# Patient Record
Sex: MALE | Race: BLACK OR AFRICAN AMERICAN | NOT HISPANIC OR LATINO | URBAN - METROPOLITAN AREA
[De-identification: names, ages, dates, MRNs, and addresses within clinical notes are randomized per-mention and may not be internally consistent; named-entity substitution may affect disease eponyms.]

---

## 2018-05-01 ENCOUNTER — OUTPATIENT (OUTPATIENT)
Dept: OUTPATIENT SERVICES | Facility: HOSPITAL | Age: 53
LOS: 1 days | End: 2018-05-01
Payer: MEDICAID

## 2018-05-01 DIAGNOSIS — I77.0 ARTERIOVENOUS FISTULA, ACQUIRED: Chronic | ICD-10-CM

## 2018-05-01 DIAGNOSIS — Z98.89 OTHER SPECIFIED POSTPROCEDURAL STATES: Chronic | ICD-10-CM

## 2018-05-01 PROCEDURE — G9001: CPT

## 2018-05-03 DIAGNOSIS — R69 ILLNESS, UNSPECIFIED: ICD-10-CM

## 2019-12-28 ENCOUNTER — INPATIENT (INPATIENT)
Facility: HOSPITAL | Age: 54
LOS: 0 days | Discharge: HOME | DRG: 640 | End: 2019-12-29
Attending: INTERNAL MEDICINE | Admitting: HOSPITALIST
Payer: MEDICAID

## 2019-12-28 VITALS
RESPIRATION RATE: 20 BRPM | SYSTOLIC BLOOD PRESSURE: 199 MMHG | DIASTOLIC BLOOD PRESSURE: 102 MMHG | WEIGHT: 195.11 LBS | HEIGHT: 70 IN | HEART RATE: 97 BPM | TEMPERATURE: 98 F | OXYGEN SATURATION: 100 %

## 2019-12-28 DIAGNOSIS — Z98.89 OTHER SPECIFIED POSTPROCEDURAL STATES: Chronic | ICD-10-CM

## 2019-12-28 DIAGNOSIS — I77.0 ARTERIOVENOUS FISTULA, ACQUIRED: Chronic | ICD-10-CM

## 2019-12-28 DIAGNOSIS — E87.5 HYPERKALEMIA: ICD-10-CM

## 2019-12-28 LAB
ALBUMIN SERPL ELPH-MCNC: 3.6 G/DL — SIGNIFICANT CHANGE UP (ref 3.3–5)
ALP SERPL-CCNC: 75 U/L — SIGNIFICANT CHANGE UP (ref 40–120)
ALT FLD-CCNC: 10 U/L — SIGNIFICANT CHANGE UP (ref 10–45)
ANION GAP SERPL CALC-SCNC: 21 MMOL/L — HIGH (ref 5–17)
ANION GAP SERPL CALC-SCNC: 22 MMOL/L — HIGH (ref 5–17)
APTT BLD: 33.5 SEC — SIGNIFICANT CHANGE UP (ref 27.5–36.3)
AST SERPL-CCNC: 9 U/L — LOW (ref 10–40)
BASOPHILS # BLD AUTO: 0.05 K/UL — SIGNIFICANT CHANGE UP (ref 0–0.2)
BASOPHILS NFR BLD AUTO: 0.5 % — SIGNIFICANT CHANGE UP (ref 0–2)
BILIRUB SERPL-MCNC: 0.4 MG/DL — SIGNIFICANT CHANGE UP (ref 0.2–1.2)
BUN SERPL-MCNC: 119 MG/DL — HIGH (ref 7–23)
BUN SERPL-MCNC: 120 MG/DL — HIGH (ref 7–23)
CALCIUM SERPL-MCNC: 10 MG/DL — SIGNIFICANT CHANGE UP (ref 8.4–10.5)
CALCIUM SERPL-MCNC: 10.4 MG/DL — SIGNIFICANT CHANGE UP (ref 8.4–10.5)
CHLORIDE SERPL-SCNC: 103 MMOL/L — SIGNIFICANT CHANGE UP (ref 96–108)
CHLORIDE SERPL-SCNC: 104 MMOL/L — SIGNIFICANT CHANGE UP (ref 96–108)
CO2 SERPL-SCNC: 14 MMOL/L — LOW (ref 22–31)
CO2 SERPL-SCNC: 15 MMOL/L — LOW (ref 22–31)
CREAT SERPL-MCNC: 22.52 MG/DL — HIGH (ref 0.5–1.3)
CREAT SERPL-MCNC: 23.06 MG/DL — HIGH (ref 0.5–1.3)
EOSINOPHIL # BLD AUTO: 0.2 K/UL — SIGNIFICANT CHANGE UP (ref 0–0.5)
EOSINOPHIL NFR BLD AUTO: 2.2 % — SIGNIFICANT CHANGE UP (ref 0–6)
GAS PNL BLDV: SIGNIFICANT CHANGE UP
GLUCOSE SERPL-MCNC: 61 MG/DL — LOW (ref 70–99)
GLUCOSE SERPL-MCNC: 77 MG/DL — SIGNIFICANT CHANGE UP (ref 70–99)
HCT VFR BLD CALC: 30.8 % — LOW (ref 39–50)
HGB BLD-MCNC: 9.5 G/DL — LOW (ref 13–17)
IMM GRANULOCYTES NFR BLD AUTO: 0.3 % — SIGNIFICANT CHANGE UP (ref 0–1.5)
INR BLD: 1.05 RATIO — SIGNIFICANT CHANGE UP (ref 0.88–1.16)
LYMPHOCYTES # BLD AUTO: 1.75 K/UL — SIGNIFICANT CHANGE UP (ref 1–3.3)
LYMPHOCYTES # BLD AUTO: 19 % — SIGNIFICANT CHANGE UP (ref 13–44)
MAGNESIUM SERPL-MCNC: 2.6 MG/DL — SIGNIFICANT CHANGE UP (ref 1.6–2.6)
MCHC RBC-ENTMCNC: 28.7 PG — SIGNIFICANT CHANGE UP (ref 27–34)
MCHC RBC-ENTMCNC: 30.8 GM/DL — LOW (ref 32–36)
MCV RBC AUTO: 93.1 FL — SIGNIFICANT CHANGE UP (ref 80–100)
MONOCYTES # BLD AUTO: 0.9 K/UL — SIGNIFICANT CHANGE UP (ref 0–0.9)
MONOCYTES NFR BLD AUTO: 9.8 % — SIGNIFICANT CHANGE UP (ref 2–14)
NEUTROPHILS # BLD AUTO: 6.28 K/UL — SIGNIFICANT CHANGE UP (ref 1.8–7.4)
NEUTROPHILS NFR BLD AUTO: 68.2 % — SIGNIFICANT CHANGE UP (ref 43–77)
NRBC # BLD: 0 /100 WBCS — SIGNIFICANT CHANGE UP (ref 0–0)
PHOSPHATE SERPL-MCNC: 2 MG/DL — LOW (ref 2.5–4.5)
PLATELET # BLD AUTO: 137 K/UL — LOW (ref 150–400)
POTASSIUM SERPL-MCNC: 5.4 MMOL/L — HIGH (ref 3.5–5.3)
POTASSIUM SERPL-MCNC: 5.9 MMOL/L — HIGH (ref 3.5–5.3)
POTASSIUM SERPL-SCNC: 5.4 MMOL/L — HIGH (ref 3.5–5.3)
POTASSIUM SERPL-SCNC: 5.9 MMOL/L — HIGH (ref 3.5–5.3)
PROT SERPL-MCNC: 7 G/DL — SIGNIFICANT CHANGE UP (ref 6–8.3)
PROTHROM AB SERPL-ACNC: 12.1 SEC — SIGNIFICANT CHANGE UP (ref 10–12.9)
RBC # BLD: 3.31 M/UL — LOW (ref 4.2–5.8)
RBC # FLD: 18.1 % — HIGH (ref 10.3–14.5)
SODIUM SERPL-SCNC: 139 MMOL/L — SIGNIFICANT CHANGE UP (ref 135–145)
SODIUM SERPL-SCNC: 140 MMOL/L — SIGNIFICANT CHANGE UP (ref 135–145)
WBC # BLD: 9.21 K/UL — SIGNIFICANT CHANGE UP (ref 3.8–10.5)
WBC # FLD AUTO: 9.21 K/UL — SIGNIFICANT CHANGE UP (ref 3.8–10.5)

## 2019-12-28 PROCEDURE — 93010 ELECTROCARDIOGRAM REPORT: CPT

## 2019-12-28 PROCEDURE — 71046 X-RAY EXAM CHEST 2 VIEWS: CPT | Mod: 26

## 2019-12-28 PROCEDURE — 99291 CRITICAL CARE FIRST HOUR: CPT

## 2019-12-28 RX ORDER — INSULIN HUMAN 100 [IU]/ML
5 INJECTION, SOLUTION SUBCUTANEOUS ONCE
Refills: 0 | Status: COMPLETED | OUTPATIENT
Start: 2019-12-28 | End: 2019-12-28

## 2019-12-28 RX ORDER — CALCIUM GLUCONATE 100 MG/ML
2 VIAL (ML) INTRAVENOUS ONCE
Refills: 0 | Status: COMPLETED | OUTPATIENT
Start: 2019-12-28 | End: 2019-12-28

## 2019-12-28 RX ORDER — DEXTROSE 50 % IN WATER 50 %
50 SYRINGE (ML) INTRAVENOUS ONCE
Refills: 0 | Status: COMPLETED | OUTPATIENT
Start: 2019-12-28 | End: 2019-12-28

## 2019-12-28 RX ORDER — ALBUTEROL 90 UG/1
10 AEROSOL, METERED ORAL ONCE
Refills: 0 | Status: COMPLETED | OUTPATIENT
Start: 2019-12-28 | End: 2019-12-28

## 2019-12-28 RX ADMIN — Medication 200 GRAM(S): at 21:18

## 2019-12-28 RX ADMIN — Medication 50 MILLILITER(S): at 21:19

## 2019-12-28 RX ADMIN — Medication 50 MILLILITER(S): at 21:18

## 2019-12-28 RX ADMIN — ALBUTEROL 10 MILLIGRAM(S): 90 AEROSOL, METERED ORAL at 21:17

## 2019-12-28 RX ADMIN — INSULIN HUMAN 5 UNIT(S): 100 INJECTION, SOLUTION SUBCUTANEOUS at 21:19

## 2019-12-28 NOTE — ED PROVIDER NOTE - CLINICAL SUMMARY MEDICAL DECISION MAKING FREE TEXT BOX
53 y/o M w/ PMH of ESRD on HD T/R/Sat, HTN, HLD, GERD presenting w/ need for HD. EKG on ED presentation concerning for hyperK given peaked t-waves. HyperK meds ordered. Labs ordered. CXR ordered. Will attempt to reach pt's nephrologist to arrange for HD. Reassess the need for additional intervention as clinically indicated. Pt will require admission.

## 2019-12-28 NOTE — ED PROVIDER NOTE - PMH
Benign Essential Hypertension    End Stage Renal Disease    ESRD (end stage renal disease) on dialysis    ESRD on hemodialysis    GERD (gastroesophageal reflux disease)    HLD (hyperlipidemia)    HTN (hypertension)    Hyperlipidemia    Hypertension    Tobacco use    Vitamin D deficiency

## 2019-12-28 NOTE — ED PROVIDER NOTE - OBJECTIVE STATEMENT
55 y/o M w/ PMH of ESRD on HD T/R/Sat, HTN, HLD, GERD presenting w/ need for HD. Pt reports missing HD for the past 2 sessions, last HD this past Tuesday. Did not get HD Thursday or today because of concern over the staff members at the HD center. Pt reports only allowing certain staff members to start HD on him because he reports other cause his arm to become very swollen. Over the past few days he has been experiencing worsening shortness of breath, dyspnea on exertion, and overall fatigue. Denies fevers, chills, headache, dizziness, blurred vision, chest pain, cough, abdominal pain, n/v/d/c, urinary symptoms, MSK pain, rash.     Nephro: Dr. Todd Avilez

## 2019-12-28 NOTE — ED ADULT TRIAGE NOTE - CHIEF COMPLAINT QUOTE
SOB x1 day and missed 2 days of hemodialysis, last done 5 days ago. SOB x1 day and missed 2 days of dialysis, last done 5 days ago.

## 2019-12-28 NOTE — ED ADULT NURSE NOTE - OBJECTIVE STATEMENT
54 yr old male arrived to the ED from home c/o two days of missed dialysis. pt normally gets dialysis on T,T,S. pt last received dialysis on Tuesday. went to  dialysis center on Thursday and Saturday saw the staff who would be accessing his fistula and left. pt endorses when some staff access his fistula he states his arm swells. pt endorses he now feels SOB with generalized weakness pt denies making any urine. upon assessment pt is A&ox4, speaking clearly, answering questions and following commands. pt is ambulatory with a steady gait. [id number] re even and unlabored. mild congestions noted in bases of lungs juliette. abd is soft, non tender. fistula in place in L arm. no swelling, redness or drainage noted from site. mild edema noted in lower extremities juliette. pt denies fever, chills, nausea, vomiting chest pain or any recent sick contacts.

## 2019-12-28 NOTE — ED PROVIDER NOTE - PSH
AV fistula  Left upper arm 2/2006  History of open reduction and internal fixation (ORIF) procedure  Left Leg  1990  Hardware subsequently removed. Year unknown  No significant past surgical history

## 2019-12-28 NOTE — ED PROVIDER NOTE - CARE PLAN
Principal Discharge DX:	Hyperkalemia  Secondary Diagnosis:	ESRD on hemodialysis  Secondary Diagnosis:	Shortness of breath

## 2019-12-28 NOTE — ED PROVIDER NOTE - NS ED ROS FT
GENERAL: No fever or chills  EYES: No change in vision  HEENT: No trouble swallowing or speaking  CARDIAC: No chest pain  PULMONARY: +SOB  GI: No abdominal pain, no nausea or no vomiting, no diarrhea or constipation  : No changes in urination  SKIN: No rashes  NEURO: No headache, no numbness  MSK: No joint pain  Otherwise as HPI or negative.

## 2019-12-28 NOTE — ED PROVIDER NOTE - ATTENDING CONTRIBUTION TO CARE
Attending MD Andres:   I personally have seen and examined this patient.  ACP, Resident, medical student note reviewed and agree on plan of care and except where noted.    54y M PMH ESRD on HD(T,Th,Sa), HTN, GERD, PUD presents with shortness of breath x 2 days. Last dialysis on Tuesday; missed Thursday and today. Patient is anuric.    On exam patient is well appearing, hypertensive, tachycardic, afebrile, tachy regular s1s2, lungs with crackles FDC up, abdomen soft, ntnd, left arm fistula with palpable thrill.     Differential includes but is not limited to pulmonary edema, patient with ekg changes, will start hyperkalemia protocol, obtain cxr, labs, nephro consult, admit.

## 2019-12-28 NOTE — ED PROVIDER NOTE - PHYSICAL EXAMINATION
Gen: NAD, AOx3, able to make needs known, non-toxic  Head: NCAT  HEENT: EOMI, oral mucosa moist, normal conjunctiva  Lung: CTAB, no respiratory distress, no wheezes/rhonchi/rales B/L, speaking in full sentences  CV: RRR, no murmurs  Abd: soft, NTND, no guarding, no CVA tenderness  MSK: no visible deformities. no LE edema  Neuro: No focal sensory or motor deficits  Skin: Warm, well perfused  Psych: normal affect

## 2019-12-28 NOTE — ED ADULT NURSE NOTE - NSIMPLEMENTINTERV_GEN_ALL_ED
Implemented All Fall Risk Interventions:  [place] to call system. Call bell, personal items and telephone within reach. Instruct patient to call for assistance. Room bathroom lighting operational. Non-slip footwear when patient is off stretcher. Physically safe environment: no spills, clutter or unnecessary equipment. Stretcher in lowest position, wheels locked, appropriate side rails in place. Provide visual cue, wrist band, yellow gown, etc. Monitor gait and stability. Monitor for mental status changes and reorient to person, place, and time. Review medications for side effects contributing to fall risk. Reinforce activity limits and safety measures with patient and family. Implemented All Universal Safety Interventions:  [place] to call system. Call bell, personal items and telephone within reach. Instruct patient to call for assistance. Room bathroom lighting operational. Non-slip footwear when patient is off stretcher. Physically safe environment: no spills, clutter or unnecessary equipment. Stretcher in lowest position, wheels locked, appropriate side rails in place.

## 2019-12-29 ENCOUNTER — TRANSCRIPTION ENCOUNTER (OUTPATIENT)
Age: 54
End: 2019-12-29

## 2019-12-29 VITALS
DIASTOLIC BLOOD PRESSURE: 90 MMHG | OXYGEN SATURATION: 99 % | RESPIRATION RATE: 18 BRPM | TEMPERATURE: 98 F | SYSTOLIC BLOOD PRESSURE: 165 MMHG | HEART RATE: 99 BPM

## 2019-12-29 DIAGNOSIS — I10 ESSENTIAL (PRIMARY) HYPERTENSION: ICD-10-CM

## 2019-12-29 DIAGNOSIS — E55.9 VITAMIN D DEFICIENCY, UNSPECIFIED: ICD-10-CM

## 2019-12-29 DIAGNOSIS — R06.02 SHORTNESS OF BREATH: ICD-10-CM

## 2019-12-29 DIAGNOSIS — N18.6 END STAGE RENAL DISEASE: ICD-10-CM

## 2019-12-29 DIAGNOSIS — E87.5 HYPERKALEMIA: ICD-10-CM

## 2019-12-29 LAB
ANION GAP SERPL CALC-SCNC: 19 MMOL/L — HIGH (ref 5–17)
BUN SERPL-MCNC: 128 MG/DL — HIGH (ref 7–23)
CALCIUM SERPL-MCNC: 10.5 MG/DL — SIGNIFICANT CHANGE UP (ref 8.4–10.5)
CHLORIDE SERPL-SCNC: 103 MMOL/L — SIGNIFICANT CHANGE UP (ref 96–108)
CO2 SERPL-SCNC: 17 MMOL/L — LOW (ref 22–31)
CREAT SERPL-MCNC: 23.47 MG/DL — HIGH (ref 0.5–1.3)
GLUCOSE SERPL-MCNC: 77 MG/DL — SIGNIFICANT CHANGE UP (ref 70–99)
HAV IGM SER-ACNC: SIGNIFICANT CHANGE UP
HBV CORE IGM SER-ACNC: SIGNIFICANT CHANGE UP
HBV SURFACE AG SER-ACNC: SIGNIFICANT CHANGE UP
HCT VFR BLD CALC: 28.9 % — LOW (ref 39–50)
HCV AB S/CO SERPL IA: 0.13 S/CO — SIGNIFICANT CHANGE UP (ref 0–0.99)
HCV AB SERPL-IMP: SIGNIFICANT CHANGE UP
HGB BLD-MCNC: 8.9 G/DL — LOW (ref 13–17)
INR BLD: 0.99 RATIO — SIGNIFICANT CHANGE UP (ref 0.88–1.16)
MAGNESIUM SERPL-MCNC: 2.6 MG/DL — SIGNIFICANT CHANGE UP (ref 1.6–2.6)
MCHC RBC-ENTMCNC: 28.5 PG — SIGNIFICANT CHANGE UP (ref 27–34)
MCHC RBC-ENTMCNC: 30.8 GM/DL — LOW (ref 32–36)
MCV RBC AUTO: 92.6 FL — SIGNIFICANT CHANGE UP (ref 80–100)
NRBC # BLD: 0 /100 WBCS — SIGNIFICANT CHANGE UP (ref 0–0)
PHOSPHATE SERPL-MCNC: 1.6 MG/DL — LOW (ref 2.5–4.5)
PLATELET # BLD AUTO: 135 K/UL — LOW (ref 150–400)
POTASSIUM SERPL-MCNC: 6.2 MMOL/L — CRITICAL HIGH (ref 3.5–5.3)
POTASSIUM SERPL-SCNC: 6.2 MMOL/L — CRITICAL HIGH (ref 3.5–5.3)
PROTHROM AB SERPL-ACNC: 11.4 SEC — SIGNIFICANT CHANGE UP (ref 10–12.9)
RBC # BLD: 3.12 M/UL — LOW (ref 4.2–5.8)
RBC # FLD: 18.1 % — HIGH (ref 10.3–14.5)
SODIUM SERPL-SCNC: 139 MMOL/L — SIGNIFICANT CHANGE UP (ref 135–145)
WBC # BLD: 8.77 K/UL — SIGNIFICANT CHANGE UP (ref 3.8–10.5)
WBC # FLD AUTO: 8.77 K/UL — SIGNIFICANT CHANGE UP (ref 3.8–10.5)

## 2019-12-29 PROCEDURE — 83605 ASSAY OF LACTIC ACID: CPT

## 2019-12-29 PROCEDURE — 82803 BLOOD GASES ANY COMBINATION: CPT

## 2019-12-29 PROCEDURE — 82947 ASSAY GLUCOSE BLOOD QUANT: CPT

## 2019-12-29 PROCEDURE — 85014 HEMATOCRIT: CPT

## 2019-12-29 PROCEDURE — 99285 EMERGENCY DEPT VISIT HI MDM: CPT | Mod: 25

## 2019-12-29 PROCEDURE — 80053 COMPREHEN METABOLIC PANEL: CPT

## 2019-12-29 PROCEDURE — 84132 ASSAY OF SERUM POTASSIUM: CPT

## 2019-12-29 PROCEDURE — 99261: CPT

## 2019-12-29 PROCEDURE — 82330 ASSAY OF CALCIUM: CPT

## 2019-12-29 PROCEDURE — 82435 ASSAY OF BLOOD CHLORIDE: CPT

## 2019-12-29 PROCEDURE — 96374 THER/PROPH/DIAG INJ IV PUSH: CPT

## 2019-12-29 PROCEDURE — 71046 X-RAY EXAM CHEST 2 VIEWS: CPT

## 2019-12-29 PROCEDURE — 85610 PROTHROMBIN TIME: CPT

## 2019-12-29 PROCEDURE — 82962 GLUCOSE BLOOD TEST: CPT

## 2019-12-29 PROCEDURE — 99223 1ST HOSP IP/OBS HIGH 75: CPT

## 2019-12-29 PROCEDURE — 96375 TX/PRO/DX INJ NEW DRUG ADDON: CPT

## 2019-12-29 PROCEDURE — 85730 THROMBOPLASTIN TIME PARTIAL: CPT

## 2019-12-29 PROCEDURE — 80074 ACUTE HEPATITIS PANEL: CPT

## 2019-12-29 PROCEDURE — 94640 AIRWAY INHALATION TREATMENT: CPT

## 2019-12-29 PROCEDURE — 93005 ELECTROCARDIOGRAM TRACING: CPT

## 2019-12-29 PROCEDURE — 80048 BASIC METABOLIC PNL TOTAL CA: CPT

## 2019-12-29 PROCEDURE — 85027 COMPLETE CBC AUTOMATED: CPT

## 2019-12-29 PROCEDURE — 84100 ASSAY OF PHOSPHORUS: CPT

## 2019-12-29 PROCEDURE — 84295 ASSAY OF SERUM SODIUM: CPT

## 2019-12-29 PROCEDURE — 83735 ASSAY OF MAGNESIUM: CPT

## 2019-12-29 RX ORDER — HEPARIN SODIUM 5000 [USP'U]/ML
5000 INJECTION INTRAVENOUS; SUBCUTANEOUS
Refills: 0 | Status: DISCONTINUED | OUTPATIENT
Start: 2019-12-29 | End: 2019-12-29

## 2019-12-29 RX ORDER — SEVELAMER CARBONATE 2400 MG/1
1600 POWDER, FOR SUSPENSION ORAL THREE TIMES A DAY
Refills: 0 | Status: DISCONTINUED | OUTPATIENT
Start: 2019-12-29 | End: 2019-12-29

## 2019-12-29 RX ORDER — NICOTINE POLACRILEX 2 MG
1 GUM BUCCAL DAILY
Refills: 0 | Status: DISCONTINUED | OUTPATIENT
Start: 2019-12-29 | End: 2019-12-29

## 2019-12-29 RX ORDER — PANTOPRAZOLE SODIUM 20 MG/1
40 TABLET, DELAYED RELEASE ORAL
Refills: 0 | Status: DISCONTINUED | OUTPATIENT
Start: 2019-12-29 | End: 2019-12-29

## 2019-12-29 RX ORDER — LISINOPRIL 2.5 MG/1
20 TABLET ORAL
Refills: 0 | Status: DISCONTINUED | OUTPATIENT
Start: 2019-12-29 | End: 2019-12-29

## 2019-12-29 RX ORDER — LABETALOL HCL 100 MG
300 TABLET ORAL
Refills: 0 | Status: DISCONTINUED | OUTPATIENT
Start: 2019-12-29 | End: 2019-12-29

## 2019-12-29 RX ORDER — CALCIUM GLUCONATE 100 MG/ML
1 VIAL (ML) INTRAVENOUS ONCE
Refills: 0 | Status: COMPLETED | OUTPATIENT
Start: 2019-12-29 | End: 2019-12-29

## 2019-12-29 RX ORDER — FAMOTIDINE 10 MG/ML
20 INJECTION INTRAVENOUS DAILY
Refills: 0 | Status: DISCONTINUED | OUTPATIENT
Start: 2019-12-29 | End: 2019-12-29

## 2019-12-29 RX ORDER — SIMVASTATIN 20 MG/1
10 TABLET, FILM COATED ORAL AT BEDTIME
Refills: 0 | Status: DISCONTINUED | OUTPATIENT
Start: 2019-12-29 | End: 2019-12-29

## 2019-12-29 RX ORDER — GABAPENTIN 400 MG/1
100 CAPSULE ORAL DAILY
Refills: 0 | Status: DISCONTINUED | OUTPATIENT
Start: 2019-12-29 | End: 2019-12-29

## 2019-12-29 RX ORDER — HYDRALAZINE HCL 50 MG
100 TABLET ORAL THREE TIMES A DAY
Refills: 0 | Status: DISCONTINUED | OUTPATIENT
Start: 2019-12-29 | End: 2019-12-29

## 2019-12-29 RX ADMIN — Medication 300 MILLIGRAM(S): at 06:14

## 2019-12-29 RX ADMIN — Medication 100 MILLIGRAM(S): at 06:14

## 2019-12-29 RX ADMIN — Medication 1 PATCH: at 08:18

## 2019-12-29 RX ADMIN — LISINOPRIL 20 MILLIGRAM(S): 2.5 TABLET ORAL at 06:14

## 2019-12-29 RX ADMIN — Medication 100 GRAM(S): at 06:06

## 2019-12-29 NOTE — DISCHARGE NOTE PROVIDER - NSDCCPCAREPLAN_GEN_ALL_CORE_FT
PRINCIPAL DISCHARGE DIAGNOSIS  Diagnosis: Hyperkalemia  Assessment and Plan of Treatment: Hyperkalemia  can  cause  serous  health  consequences.  Pt  can  use  therapies  to  gradual  reduceserum  such  as  low  potassium  diest   and  adding  diureticd.  Monitoring   your  lab  work/    Aviod  Missing  HD          SECONDARY DISCHARGE DIAGNOSES  Diagnosis: Shortness of breath  Assessment and Plan of Treatment: Call your Health Care provider upon arrival home to make a follow up appointment within next  24 hours.   If your symptoms  increased  in severity and/or increased shortness of breath call your Health Care Provider.  If you develop fever, chills, night sweats, malaise, and/or change in mental status call your Health care Provider.  Nutrition is very important.  Eat small frequent meals.  Increase your activity as tolerated.  Do not stay in bed all day      Diagnosis: ESRD on hemodialysis  Assessment and Plan of Treatment: Avoid taking (NSAIDs) - (ex: Ibuprofen, Advil, Celebrex, Naprosyn)  Avoid taking any nephrotoxic agents (can harm kidneys) - Intravenous contrast for diagnostic testing, combination cold medications.  Have all medications adjusted for your renal function by your Health Care Provider.  Blood pressure control is important.  Take all medication as prescribed.
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
## 2019-12-29 NOTE — H&P ADULT - ASSESSMENT
55yo M w/PMHx of ESRD on HD T/T/S (nephrologist is Dr. Avilez), HLD, GERD, Vit D deficiency though reports no longer taking Vit D?, HTN, now presenting with worsening dyspnea on exertion x 2 days in the setting of having missed his HD sessions last saturday as well as last thursday.

## 2019-12-29 NOTE — DISCHARGE NOTE PROVIDER - CARE PROVIDER_API CALL
Lee Ann Wallace)  Internal Medicine  [address] [place] [address]  Phone: [phone]  Fax: [phone]  Established Patient  Follow Up Time:

## 2019-12-29 NOTE — DISCHARGE NOTE PROVIDER - HOSPITAL COURSE
55yo M w/PMHx of ESRD on HD T/T/S (nephrologist is Dr. Avilez), HLD, GERD, Vit D deficiency though reports no longer taking Vit D?, HTN, now presenting with worsening dyspnea on exertion x 2 days in the setting of having missed his HD sessions last Saturday as well as last Thursday.  Pt  was  admitted  on  12/29/2019. 55yo M w/PMHx of ESRD on HD T/T/S (nephrologist is Dr. Avilez), HLD, GERD, Vit D deficiency though reports no longer taking Vit D?, HTN, now presenting with worsening dyspnea on exertion x 2 days in the setting of having missed his HD sessions last Saturday as well as last Thursday.  Pt  was  admitted  on  12/29/2019.     Had HD today, now hemodynamically stable to be discharged home today, spoke to Attending. 55yo M w/PMHx of ESRD on HD T/T/S (nephrologist is Dr. Avilez), HLD, GERD, Vit D deficiency though reports no longer taking Vit D?, HTN, now presenting with worsening dyspnea on exertion x 2 days in the setting of having missed his HD sessions last Saturday as well as last Thursday.  Pt  was  admitted  on  12/29/2019.     Had HD today, now hemodynamically stable to be discharged home today, spoke to Attending.         Seen and examined while undergoing HD. I need to go home after HD . Spoke to renal attending Dr robertson and dorina to follow outpt.

## 2019-12-29 NOTE — CONSULT NOTE ADULT - ATTENDING COMMENTS
Thank you for allowing me to participate in the care of your patient    For any question, call:  Cell # [phone]  Pager # [phone]  Callback # [phone]

## 2019-12-29 NOTE — CONSULT NOTE ADULT - PROBLEM SELECTOR RECOMMENDATION 9
consent in chart  receiving Hd this AM  refused HD at his center because he did not like/had disagreements with the nursing staff, advised compliance  dose meds for ESRd  renal diet  avoid nephrotoxins  daily BMP

## 2019-12-29 NOTE — H&P ADULT - NSICDXFAMILYHX_GEN_ALL_CORE_FT
FAMILY HISTORY:  Family history of chronic renal disease  Family history of hypertension in mother, HTN  Family history of lymphoma

## 2019-12-29 NOTE — H&P ADULT - PROBLEM SELECTOR PLAN 5
Pt reports that he no longer takes vitamin D? Unclear reason why.  Will need to clarify with outpatient physicians, likely would benefit from resuming on dc

## 2019-12-29 NOTE — DISCHARGE NOTE PROVIDER - NSDCCAREPROVSEEN_GEN_ALL_CORE_FT
Didier Hui Didier Hui Mairaj U  [facility] Medicine, Advance PracticeTeam  Roverto Nye

## 2019-12-29 NOTE — CHART NOTE - NSCHARTNOTEFT_GEN_A_CORE
Called  by  RN  that  pt  is  requesting  to   AMA .  He  states  he  wants  to  have  dialysis  now  or  he  wants  to  go  home.  Explained  to  pt  that  is  potassium  was  elevated secondary  to  missing  dialysis.  Explained  to  pt  in  depth  the  risk  of  leaving  the  hospital  against  medical  advice  including  death.   Pt  was  also   informed  that  phone  called  was  placed  to  nephrologist  team,  to  place  him  on  dialysis  this  am.    Pt  verbalized  understanding.    Hospitalist  the  Dr Hui  ,  whom  did  the  admission  was  made  aware  of  pt  decision.    Dr Hui  will  come  down  to  see  pt    regards  to  this  matter.

## 2019-12-29 NOTE — H&P ADULT - NSICDXPASTSURGICALHX_GEN_ALL_CORE_FT
PAST SURGICAL HISTORY:  AV fistula Left upper arm 2/2006    History of open reduction and internal fixation (ORIF) procedure Left Leg  1990  Hardware subsequently removed. Year unknown    No significant past surgical history
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
## 2019-12-29 NOTE — H&P ADULT - NSICDXPASTMEDICALHX_GEN_ALL_CORE_FT
PAST MEDICAL HISTORY:  Benign Essential Hypertension     End Stage Renal Disease     ESRD (end stage renal disease) on dialysis     ESRD on hemodialysis     GERD (gastroesophageal reflux disease)     HLD (hyperlipidemia)     HTN (hypertension)     Hyperlipidemia     Hypertension     Tobacco use     Vitamin D deficiency

## 2019-12-29 NOTE — H&P ADULT - NSHPLABSRESULTS_GEN_ALL_CORE
Labs personally reviewed:                        9.5    9.21  )-----------( 137      ( 28 Dec 2019 21:20 )             30.8       12-28    139  |  104  |  120<H>  ----------------------------<  61<L>  5.4<H>   |  14<L>  |  22.52<H>    Ca    10.4      28 Dec 2019 22:44  Phos  2.0     12-28  Mg     2.6     12-28    TPro  7.0  /  Alb  3.6  /  TBili  0.4  /  DBili  x   /  AST  9<L>  /  ALT  10  /  AlkPhos  75  12-28            LIVER FUNCTIONS - ( 28 Dec 2019 21:20 )  Alb: 3.6 g/dL / Pro: 7.0 g/dL / ALK PHOS: 75 U/L / ALT: 10 U/L / AST: 9 U/L / GGT: x             PT/INR - ( 28 Dec 2019 21:20 )   PT: 12.1 sec;   INR: 1.05 ratio         PTT - ( 28 Dec 2019 21:20 )  PTT:33.5 sec  CXR reviewed-grossly clear lungs  EKG personally reviewed-peaked t-waves in lateral leads

## 2019-12-29 NOTE — DISCHARGE NOTE NURSING/CASE MANAGEMENT/SOCIAL WORK - PATIENT PORTAL LINK FT
You can access the FollowMyHealth Patient Portal offered by [facility] by registering at the following website: [email or web address]. By joining [employer]’s FollowMyHealth portal, you will also be able to view your health information using other applications (apps) compatible with our system.

## 2019-12-29 NOTE — H&P ADULT - NSHPPHYSICALEXAM_GEN_ALL_CORE
Vital Signs Last 24 Hrs  T(C): 36.8 (29 Dec 2019 03:45), Max: 37.3 (28 Dec 2019 22:32)  T(F): 98.2 (29 Dec 2019 03:45), Max: 99.1 (28 Dec 2019 22:32)  HR: 100 (29 Dec 2019 03:45) (97 - 100)  BP: 172/84 (29 Dec 2019 03:45) (172/84 - 199/102)  BP(mean): --  RR: 18 (29 Dec 2019 03:45) (18 - 20)  SpO2: 98% (29 Dec 2019 03:45) (97% - 100%)    GENERAL: No acute distress, well-developed  HEAD:  Atraumatic, Normocephalic  EYES: EOMI, PERRLA, conjunctiva and sclera clear  ENT: Oral mucosa moist  NECK: Neck supple  CHEST/LUNG: Clear to auscultation bilaterally; No wheeze, no rales, no rhonchi.    HEART: Regular rate and rhythm; No murmurs, rubs, or gallops  ABDOMEN: Soft, Nontender, Nondistended; Bowel sounds present  EXTREMITIES:  No clubbing, cyanosis, or edema  VASCULAR: Posterior tibialis pulses intact bilaterally  PSYCH: Normal behavior, normal affect  NEUROLOGY: AAOx3  SKIN: grossly warm and dry

## 2019-12-29 NOTE — ED ADULT NURSE REASSESSMENT NOTE - NS ED NURSE REASSESS COMMENT FT1
repeat . VS as documented. pt is resting comfortably. no distress noted. cardiac monitor in place. awaiting bed assignment

## 2019-12-29 NOTE — DISCHARGE NOTE PROVIDER - NSDCMRMEDTOKEN_GEN_ALL_CORE_FT
famotidine 20 mg oral tablet: 1 tab(s) orally once a day  gabapentin 100 mg oral tablet: 1 tab(s) orally once a day  hydrALAZINE 100 mg oral tablet: 1 tab(s) orally 3 times a day  labetalol 300 mg oral tablet: 1 tab(s) orally twice a day  lisinopril 20 mg oral tablet:  orally 2 times a day  pantoprazole 40 mg oral delayed release tablet: twice daily   Renagel 800 mg oral tablet: 2 tab(s) orally 3 times a day  simvastatin 10 mg oral tablet: 1 tab(s) orally once a day (at bedtime)

## 2019-12-29 NOTE — H&P ADULT - PROBLEM SELECTOR PLAN 1
2/2 missed HD  Recheck BMP stat  Nephrology consult appreciated-planned for HD in AM  Give calcium gluconate again now

## 2019-12-29 NOTE — H&P ADULT - PROBLEM SELECTOR PLAN 2
Most likely related to volume overload 2/2 missed HD sessions  Would benefit most from HD as above as patient does not make any urine.

## 2019-12-30 LAB — GLUCOSE BLDC GLUCOMTR-MCNC: 114 MG/DL — HIGH (ref 70–99)

## 2020-01-01 ENCOUNTER — OUTPATIENT (OUTPATIENT)
Dept: OUTPATIENT SERVICES | Facility: HOSPITAL | Age: 55
LOS: 1 days | End: 2020-01-01
Payer: MEDICAID

## 2020-01-01 DIAGNOSIS — Z98.89 OTHER SPECIFIED POSTPROCEDURAL STATES: Chronic | ICD-10-CM

## 2020-01-01 DIAGNOSIS — I77.0 ARTERIOVENOUS FISTULA, ACQUIRED: Chronic | ICD-10-CM

## 2020-01-01 PROCEDURE — G9001: CPT

## 2020-01-03 DIAGNOSIS — Z71.89 OTHER SPECIFIED COUNSELING: ICD-10-CM

## 2020-02-01 ENCOUNTER — OUTPATIENT (OUTPATIENT)
Dept: OUTPATIENT SERVICES | Facility: HOSPITAL | Age: 55
LOS: 1 days | End: 2020-02-01

## 2020-02-01 DIAGNOSIS — Z98.89 OTHER SPECIFIED POSTPROCEDURAL STATES: Chronic | ICD-10-CM

## 2020-02-01 DIAGNOSIS — I77.0 ARTERIOVENOUS FISTULA, ACQUIRED: Chronic | ICD-10-CM

## 2020-02-19 DIAGNOSIS — Z71.89 OTHER SPECIFIED COUNSELING: ICD-10-CM

## 2021-01-26 ENCOUNTER — INPATIENT (INPATIENT)
Facility: HOSPITAL | Age: 56
LOS: 6 days | Discharge: HOME | DRG: 377 | End: 2021-02-02
Attending: HOSPITALIST | Admitting: HOSPITALIST
Payer: MEDICAID

## 2021-01-26 VITALS
WEIGHT: 191.8 LBS | RESPIRATION RATE: 18 BRPM | SYSTOLIC BLOOD PRESSURE: 128 MMHG | TEMPERATURE: 99 F | HEART RATE: 93 BPM | OXYGEN SATURATION: 100 % | HEIGHT: 70 IN | DIASTOLIC BLOOD PRESSURE: 83 MMHG

## 2021-01-26 DIAGNOSIS — I77.0 ARTERIOVENOUS FISTULA, ACQUIRED: Chronic | ICD-10-CM

## 2021-01-26 DIAGNOSIS — Z98.89 OTHER SPECIFIED POSTPROCEDURAL STATES: Chronic | ICD-10-CM

## 2021-01-26 PROCEDURE — 93010 ELECTROCARDIOGRAM REPORT: CPT

## 2021-01-26 PROCEDURE — 99285 EMERGENCY DEPT VISIT HI MDM: CPT

## 2021-01-27 DIAGNOSIS — D64.9 ANEMIA, UNSPECIFIED: ICD-10-CM

## 2021-01-27 DIAGNOSIS — Z72.0 TOBACCO USE: ICD-10-CM

## 2021-01-27 DIAGNOSIS — I10 ESSENTIAL (PRIMARY) HYPERTENSION: ICD-10-CM

## 2021-01-27 DIAGNOSIS — N18.6 END STAGE RENAL DISEASE: ICD-10-CM

## 2021-01-27 DIAGNOSIS — N25.0 RENAL OSTEODYSTROPHY: ICD-10-CM

## 2021-01-27 LAB
ALBUMIN SERPL ELPH-MCNC: 2.7 G/DL — LOW (ref 3.3–5)
ALP SERPL-CCNC: 72 U/L — SIGNIFICANT CHANGE UP (ref 40–120)
ALT FLD-CCNC: 8 U/L — LOW (ref 10–45)
ANION GAP SERPL CALC-SCNC: 11 MMOL/L — SIGNIFICANT CHANGE UP (ref 5–17)
ANISOCYTOSIS BLD QL: SLIGHT — SIGNIFICANT CHANGE UP
APTT BLD: 29.4 SEC — SIGNIFICANT CHANGE UP (ref 27.5–35.5)
AST SERPL-CCNC: 12 U/L — SIGNIFICANT CHANGE UP (ref 10–40)
BASE EXCESS BLDV CALC-SCNC: 3.7 MMOL/L — HIGH (ref -2–2)
BASOPHILS # BLD AUTO: 0 K/UL — SIGNIFICANT CHANGE UP (ref 0–0.2)
BASOPHILS NFR BLD AUTO: 0 % — SIGNIFICANT CHANGE UP (ref 0–2)
BILIRUB SERPL-MCNC: 0.2 MG/DL — SIGNIFICANT CHANGE UP (ref 0.2–1.2)
BLD GP AB SCN SERPL QL: NEGATIVE — SIGNIFICANT CHANGE UP
BUN SERPL-MCNC: 67 MG/DL — HIGH (ref 7–23)
CA-I SERPL-SCNC: 1.26 MMOL/L — SIGNIFICANT CHANGE UP (ref 1.12–1.3)
CALCIUM SERPL-MCNC: 9 MG/DL — SIGNIFICANT CHANGE UP (ref 8.4–10.5)
CHLORIDE BLDV-SCNC: 99 MMOL/L — SIGNIFICANT CHANGE UP (ref 96–108)
CHLORIDE SERPL-SCNC: 97 MMOL/L — SIGNIFICANT CHANGE UP (ref 96–108)
CO2 BLDV-SCNC: 29 MMOL/L — SIGNIFICANT CHANGE UP (ref 22–30)
CO2 SERPL-SCNC: 27 MMOL/L — SIGNIFICANT CHANGE UP (ref 22–31)
CREAT SERPL-MCNC: 9.55 MG/DL — HIGH (ref 0.5–1.3)
EOSINOPHIL # BLD AUTO: 0.27 K/UL — SIGNIFICANT CHANGE UP (ref 0–0.5)
EOSINOPHIL NFR BLD AUTO: 2.6 % — SIGNIFICANT CHANGE UP (ref 0–6)
GAS PNL BLDV: 135 MMOL/L — SIGNIFICANT CHANGE UP (ref 135–145)
GAS PNL BLDV: SIGNIFICANT CHANGE UP
GIANT PLATELETS BLD QL SMEAR: PRESENT — SIGNIFICANT CHANGE UP
GLUCOSE BLDV-MCNC: 82 MG/DL — SIGNIFICANT CHANGE UP (ref 70–99)
GLUCOSE SERPL-MCNC: 85 MG/DL — SIGNIFICANT CHANGE UP (ref 70–99)
HCO3 BLDV-SCNC: 28 MMOL/L — SIGNIFICANT CHANGE UP (ref 21–29)
HCT VFR BLD CALC: 14.9 % — CRITICAL LOW (ref 39–50)
HCT VFR BLD CALC: 18.5 % — CRITICAL LOW (ref 39–50)
HCT VFR BLD CALC: 23.6 % — LOW (ref 39–50)
HCT VFR BLDA CALC: 17 % — CRITICAL LOW (ref 39–50)
HGB BLD CALC-MCNC: 5.3 G/DL — CRITICAL LOW (ref 13–17)
HGB BLD-MCNC: 4.8 G/DL — CRITICAL LOW (ref 13–17)
HGB BLD-MCNC: 6.1 G/DL — CRITICAL LOW (ref 13–17)
HGB BLD-MCNC: 7.7 G/DL — LOW (ref 13–17)
HYPOCHROMIA BLD QL: SLIGHT — SIGNIFICANT CHANGE UP
INR BLD: 1.05 RATIO — SIGNIFICANT CHANGE UP (ref 0.88–1.16)
LACTATE BLDV-MCNC: 0.6 MMOL/L — LOW (ref 0.7–2)
LYMPHOCYTES # BLD AUTO: 2.72 K/UL — SIGNIFICANT CHANGE UP (ref 1–3.3)
LYMPHOCYTES # BLD AUTO: 26.1 % — SIGNIFICANT CHANGE UP (ref 13–44)
MACROCYTES BLD QL: SLIGHT — SIGNIFICANT CHANGE UP
MANUAL SMEAR VERIFICATION: SIGNIFICANT CHANGE UP
MCHC RBC-ENTMCNC: 29.6 PG — SIGNIFICANT CHANGE UP (ref 27–34)
MCHC RBC-ENTMCNC: 30.1 PG — SIGNIFICANT CHANGE UP (ref 27–34)
MCHC RBC-ENTMCNC: 30.8 PG — SIGNIFICANT CHANGE UP (ref 27–34)
MCHC RBC-ENTMCNC: 32.2 GM/DL — SIGNIFICANT CHANGE UP (ref 32–36)
MCHC RBC-ENTMCNC: 32.6 GM/DL — SIGNIFICANT CHANGE UP (ref 32–36)
MCHC RBC-ENTMCNC: 33 GM/DL — SIGNIFICANT CHANGE UP (ref 32–36)
MCV RBC AUTO: 89.8 FL — SIGNIFICANT CHANGE UP (ref 80–100)
MCV RBC AUTO: 92.2 FL — SIGNIFICANT CHANGE UP (ref 80–100)
MCV RBC AUTO: 95.5 FL — SIGNIFICANT CHANGE UP (ref 80–100)
MONOCYTES # BLD AUTO: 0.54 K/UL — SIGNIFICANT CHANGE UP (ref 0–0.9)
MONOCYTES NFR BLD AUTO: 5.2 % — SIGNIFICANT CHANGE UP (ref 2–14)
NEUTROPHILS # BLD AUTO: 6.8 K/UL — SIGNIFICANT CHANGE UP (ref 1.8–7.4)
NEUTROPHILS NFR BLD AUTO: 65.2 % — SIGNIFICANT CHANGE UP (ref 43–77)
NRBC # BLD: 0 /100 WBCS — SIGNIFICANT CHANGE UP (ref 0–0)
NRBC # BLD: 0 /100 WBCS — SIGNIFICANT CHANGE UP (ref 0–0)
NT-PROBNP SERPL-SCNC: HIGH PG/ML (ref 0–300)
OB PNL STL: POSITIVE
PCO2 BLDV: 45 MMHG — SIGNIFICANT CHANGE UP (ref 35–50)
PH BLDV: 7.41 — SIGNIFICANT CHANGE UP (ref 7.35–7.45)
PLAT MORPH BLD: NORMAL — SIGNIFICANT CHANGE UP
PLATELET # BLD AUTO: 148 K/UL — LOW (ref 150–400)
PLATELET # BLD AUTO: 156 K/UL — SIGNIFICANT CHANGE UP (ref 150–400)
PLATELET # BLD AUTO: 162 K/UL — SIGNIFICANT CHANGE UP (ref 150–400)
PLATELET COUNT - ESTIMATE: NORMAL — SIGNIFICANT CHANGE UP
PO2 BLDV: 21 MMHG — LOW (ref 25–45)
POLYCHROMASIA BLD QL SMEAR: SLIGHT — SIGNIFICANT CHANGE UP
POTASSIUM BLDV-SCNC: 4.1 MMOL/L — SIGNIFICANT CHANGE UP (ref 3.5–5.3)
POTASSIUM SERPL-MCNC: 4.3 MMOL/L — SIGNIFICANT CHANGE UP (ref 3.5–5.3)
POTASSIUM SERPL-SCNC: 4.3 MMOL/L — SIGNIFICANT CHANGE UP (ref 3.5–5.3)
PROT SERPL-MCNC: 5.5 G/DL — LOW (ref 6–8.3)
PROTHROM AB SERPL-ACNC: 12.6 SEC — SIGNIFICANT CHANGE UP (ref 10.6–13.6)
RBC # BLD: 1.56 M/UL — LOW (ref 4.2–5.8)
RBC # BLD: 2.06 M/UL — LOW (ref 4.2–5.8)
RBC # BLD: 2.56 M/UL — LOW (ref 4.2–5.8)
RBC # FLD: 17.9 % — HIGH (ref 10.3–14.5)
RBC # FLD: 18.1 % — HIGH (ref 10.3–14.5)
RBC # FLD: 18.4 % — HIGH (ref 10.3–14.5)
RBC BLD AUTO: ABNORMAL
RH IG SCN BLD-IMP: POSITIVE — SIGNIFICANT CHANGE UP
SAO2 % BLDV: 31 % — LOW (ref 67–88)
SARS-COV-2 IGG SERPL QL IA: NEGATIVE — SIGNIFICANT CHANGE UP
SARS-COV-2 IGM SERPL IA-ACNC: <0.1 INDEX — SIGNIFICANT CHANGE UP
SARS-COV-2 RNA SPEC QL NAA+PROBE: SIGNIFICANT CHANGE UP
SODIUM SERPL-SCNC: 135 MMOL/L — SIGNIFICANT CHANGE UP (ref 135–145)
TROPONIN T, HIGH SENSITIVITY RESULT: 72 NG/L — HIGH (ref 0–51)
VARIANT LYMPHS # BLD: 0.9 % — SIGNIFICANT CHANGE UP (ref 0–6)
WBC # BLD: 10.43 K/UL — SIGNIFICANT CHANGE UP (ref 3.8–10.5)
WBC # BLD: 9.29 K/UL — SIGNIFICANT CHANGE UP (ref 3.8–10.5)
WBC # BLD: 9.38 K/UL — SIGNIFICANT CHANGE UP (ref 3.8–10.5)
WBC # FLD AUTO: 10.43 K/UL — SIGNIFICANT CHANGE UP (ref 3.8–10.5)
WBC # FLD AUTO: 9.29 K/UL — SIGNIFICANT CHANGE UP (ref 3.8–10.5)
WBC # FLD AUTO: 9.38 K/UL — SIGNIFICANT CHANGE UP (ref 3.8–10.5)

## 2021-01-27 PROCEDURE — 12345: CPT | Mod: NC

## 2021-01-27 PROCEDURE — 71045 X-RAY EXAM CHEST 1 VIEW: CPT | Mod: 26

## 2021-01-27 PROCEDURE — 99223 1ST HOSP IP/OBS HIGH 75: CPT | Mod: GC

## 2021-01-27 PROCEDURE — 99406 BEHAV CHNG SMOKING 3-10 MIN: CPT

## 2021-01-27 PROCEDURE — 99223 1ST HOSP IP/OBS HIGH 75: CPT

## 2021-01-27 RX ORDER — PANTOPRAZOLE SODIUM 20 MG/1
40 TABLET, DELAYED RELEASE ORAL
Refills: 0 | Status: DISCONTINUED | OUTPATIENT
Start: 2021-01-27 | End: 2021-01-27

## 2021-01-27 RX ORDER — NICOTINE POLACRILEX 2 MG
1 GUM BUCCAL DAILY
Refills: 0 | Status: DISCONTINUED | OUTPATIENT
Start: 2021-01-27 | End: 2021-02-02

## 2021-01-27 RX ORDER — PANTOPRAZOLE SODIUM 20 MG/1
8 TABLET, DELAYED RELEASE ORAL
Qty: 80 | Refills: 0 | Status: DISCONTINUED | OUTPATIENT
Start: 2021-01-27 | End: 2021-01-29

## 2021-01-27 RX ORDER — SIMVASTATIN 20 MG/1
20 TABLET, FILM COATED ORAL AT BEDTIME
Refills: 0 | Status: DISCONTINUED | OUTPATIENT
Start: 2021-01-27 | End: 2021-02-02

## 2021-01-27 RX ORDER — CHLORHEXIDINE GLUCONATE 213 G/1000ML
1 SOLUTION TOPICAL ONCE
Refills: 0 | Status: DISCONTINUED | OUTPATIENT
Start: 2021-01-27 | End: 2021-02-02

## 2021-01-27 RX ORDER — INFLUENZA VIRUS VACCINE 15; 15; 15; 15 UG/.5ML; UG/.5ML; UG/.5ML; UG/.5ML
0.5 SUSPENSION INTRAMUSCULAR ONCE
Refills: 0 | Status: DISCONTINUED | OUTPATIENT
Start: 2021-01-27 | End: 2021-02-02

## 2021-01-27 RX ORDER — GABAPENTIN 400 MG/1
300 CAPSULE ORAL DAILY
Refills: 0 | Status: DISCONTINUED | OUTPATIENT
Start: 2021-01-27 | End: 2021-02-02

## 2021-01-27 RX ORDER — PANTOPRAZOLE SODIUM 20 MG/1
80 TABLET, DELAYED RELEASE ORAL ONCE
Refills: 0 | Status: COMPLETED | OUTPATIENT
Start: 2021-01-27 | End: 2021-01-27

## 2021-01-27 RX ORDER — ERYTHROPOIETIN 10000 [IU]/ML
20000 INJECTION, SOLUTION INTRAVENOUS; SUBCUTANEOUS
Refills: 0 | Status: DISCONTINUED | OUTPATIENT
Start: 2021-01-27 | End: 2021-02-02

## 2021-01-27 RX ORDER — SEVELAMER CARBONATE 2400 MG/1
800 POWDER, FOR SUSPENSION ORAL
Refills: 0 | Status: DISCONTINUED | OUTPATIENT
Start: 2021-01-27 | End: 2021-01-30

## 2021-01-27 RX ADMIN — GABAPENTIN 300 MILLIGRAM(S): 400 CAPSULE ORAL at 18:15

## 2021-01-27 RX ADMIN — ERYTHROPOIETIN 20000 UNIT(S): 10000 INJECTION, SOLUTION INTRAVENOUS; SUBCUTANEOUS at 15:23

## 2021-01-27 RX ADMIN — PANTOPRAZOLE SODIUM 80 MILLIGRAM(S): 20 TABLET, DELAYED RELEASE ORAL at 01:49

## 2021-01-27 RX ADMIN — Medication 1 PATCH: at 18:16

## 2021-01-27 RX ADMIN — SIMVASTATIN 20 MILLIGRAM(S): 20 TABLET, FILM COATED ORAL at 20:48

## 2021-01-27 RX ADMIN — Medication 1 PATCH: at 11:30

## 2021-01-27 RX ADMIN — PANTOPRAZOLE SODIUM 40 MILLIGRAM(S): 20 TABLET, DELAYED RELEASE ORAL at 06:21

## 2021-01-27 RX ADMIN — PANTOPRAZOLE SODIUM 10 MG/HR: 20 TABLET, DELAYED RELEASE ORAL at 20:42

## 2021-01-27 RX ADMIN — PANTOPRAZOLE SODIUM 10 MG/HR: 20 TABLET, DELAYED RELEASE ORAL at 11:32

## 2021-01-27 NOTE — H&P ADULT - PROBLEM SELECTOR PLAN 4
Performed smoking cessation counseling with patient. Reviewed modalities such as nicotine replacement therapy and pharmacological agents. Also reviewed deleterious effects of smoking such as increased malignancy and infection. 3 minutes spent with patient.     Start Nicotine patch while in house

## 2021-01-27 NOTE — ED ADULT NURSE REASSESSMENT NOTE - NS ED NURSE REASSESS COMMENT FT1
1 unit of PRBC's ordered due to patients low hemoglobin & hematocrit. Type & Screen resulted. Consent in chart. Blood transfusion started with 2nd RN Josi. Pt made aware of signs & symptoms of reaction to make RN aware of, pt verbalized understanding. 1 unit of PRBC's ordered due to patients low hemoglobin & hematocrit. Type & Screen resulted. Signed consent in chart. Blood transfusion started with 2nd RN Josi. Pt made aware of signs & symptoms of reaction to make RN aware of, pt verbalized understanding.

## 2021-01-27 NOTE — H&P ADULT - NSICDXPASTMEDICALHX_GEN_ALL_CORE_FT
PAST MEDICAL HISTORY:  Benign Essential Hypertension     ESRD on hemodialysis     GERD (gastroesophageal reflux disease)     HLD (hyperlipidemia)     HTN (hypertension)     Hyperlipidemia     Hypertension     Tobacco use     Vitamin D deficiency

## 2021-01-27 NOTE — PATIENT PROFILE ADULT - NSPROGENSOURCEINFO_GEN_A_NUR
patient
Detail Level: Simple
Additional Notes: Patient may wash the affected area with the ketoconazole shampoo

## 2021-01-27 NOTE — CONSULT NOTE ADULT - ASSESSMENT
55M with PMHx of ESRD (M/W/F), HTN, & GERD presents to [facility] with two day history of fatigue, dyspnea on exertion and melena. Patient receives HD via [facility] MWF at [facility].

## 2021-01-27 NOTE — ED ADULT NURSE NOTE - OBJECTIVE STATEMENT
Pt is a 54 y/o male pmhx of HTN, HLD & ESRD (dialysis MWF) presents to the ED BIBA complaining of generalized weakness. He says that he went to dialysis yesterday and then last night he began feeling weak and couldn't get out of bed and walk. He endorses also feeling SOB. Pt presents alert & oriented x 4, calm, able to follow commands, speech clear. Breathing spontaneous & nonlabored, sating 100% on RA, lungs diminished b/l. On cardiac monitor, NSR 90's. Abdomen soft & nondistended. Usually ambulates without assist. Strong peripheral pulses noted b/l, no edema noted. Skin warm, clean, dry & intact. Denies chest pain, abdominal pain, back pain, n/v/d, fever, chills, numbness & tingling, changes in vision. Call bell within reach, bed in lowest position, side rails up, wheels locked. Pt is a 56 y/o male pmhx of HTN, HLD & ESRD (dialysis MWF) presents to the ED BIBA complaining of generalized weakness. He says that he went to dialysis yesterday and then last night he began feeling weak and couldn't get out of bed and walk. He endorses also feeling SOB. Pt presents alert & oriented x 4, calm, able to follow commands, speech clear. Sclera jaundiced. Breathing spontaneous & nonlabored, sating 100% on RA, lungs diminished b/l, pt prefers to be on 2L nasal cannula and states it makes him feel better. On cardiac monitor, NSR 90's. Abdomen soft & nondistended. Usually ambulates without assist. Strong peripheral pulses noted b/l, no edema noted. Skin warm, clean, dry & intact. LUE is do not use extremity due to fistula. Denies chest pain, abdominal pain, back pain, n/v/d, fever, chills, numbness & tingling, changes in vision. Call bell within reach, bed in lowest position, side rails up, wheels locked. Pt is a 56 y/o male pmhx of HTN, HLD & ESRD (dialysis MWF) presents to the ED BIBA complaining of generalized weakness. He says that he went to dialysis yesterday and then last night he began feeling weak and couldn't get out of bed and walk. He endorses also feeling SOB. Pt presents alert & oriented x 4, calm, able to follow commands, speech clear. Sclera jaundiced. Breathing spontaneous & nonlabored, sating 100% on RA, lungs diminished b/l, pt prefers to be on 2L nasal cannula and states it makes him feel better. On cardiac monitor, NSR 90's. Abdomen soft & nondistended. Usually ambulates without assist. Strong peripheral pulses noted b/l, no edema noted. Skin warm, clean, dry & intact. LUE is do not use extremity due to fistula. Pt is an everyday smoker. Denies chest pain, abdominal pain, back pain, n/v/d, fever, chills, numbness & tingling, changes in vision. Call bell within reach, bed in lowest position, side rails up, wheels locked. Pt is a 54 y/o male pmhx of HTN, HLD & ESRD (dialysis MWF) presents to the ED BIBA complaining of generalized weakness. He says that he went to dialysis yesterday and then last night he began feeling weak and couldn't get out of bed and walk. He endorses also feeling SOB and dark stools. Pt presents alert & oriented x 4, calm, able to follow commands, speech clear. Sclera jaundiced. Breathing spontaneous & nonlabored, sating 100% on RA, lungs diminished b/l, pt prefers to be on 2L nasal cannula and states it makes him feel better. On cardiac monitor, NSR 90's. Abdomen soft & nondistended. Usually ambulates without assist. Strong peripheral pulses noted b/l, no edema noted. Skin warm, clean, dry & intact. LUE is do not use extremity due to fistula. Pt is an everyday smoker. Denies chest pain, abdominal pain, back pain, n/v/d, fever, chills, numbness & tingling, changes in vision. Call bell within reach, bed in lowest position, side rails up, wheels locked.

## 2021-01-27 NOTE — ED PROVIDER NOTE - NS ED ROS FT
Constitutional: No fever or chills  Eyes: No visual changes  CV: No chest pain or lower extremity edema  Resp: + SOB no cough  GI: No abd pain. No nausea or vomiting. No diarrhea.   : No dysuria, hematuria.   MSK: No musculoskeletal pain  Skin: No rash  Psych: No complaints   Neuro: No headache. No numbness or tingling. +fatigue

## 2021-01-27 NOTE — H&P ADULT - NSHPPHYSICALEXAM_GEN_ALL_CORE
T(C): 37.1 (01-27-21 @ 03:33), Max: 37.3 (01-26-21 @ 23:42)  HR: 98 (01-27-21 @ 03:33) (93 - 100)  BP: 119/87 (01-27-21 @ 03:33) (119/87 - 143/88)  RR: 12 (01-27-21 @ 03:33) (12 - 20)  SpO2: 100% (01-27-21 @ 03:33) (100% - 100%)    Gen: (fe)male in NAD, appears comfortable, no diaphoresis  HEENT: NCAT, MMM, neck soft and supple  CV: +S1/S2, no m/r/g  Resp: CTAB, no w/r/r  GI: normoactive BS, soft, NTND, no rebounding/guarding  Ext: No LE edema, extremities appear warm and well perfused   Neuro: AOx3, no focal deficits, CNII-XII grossly intact  Psych: No SI/HI/AVH, appropriate affect  Skin: no petechiae, ecchymosis or maculopapular rash noted T(C): 37.1 (01-27-21 @ 03:33), Max: 37.3 (01-26-21 @ 23:42)  HR: 98 (01-27-21 @ 03:33) (93 - 100)  BP: 119/87 (01-27-21 @ 03:33) (119/87 - 143/88)  RR: 12 (01-27-21 @ 03:33) (12 - 20)  SpO2: 100% (01-27-21 @ 03:33) (100% - 100%)    Gen: male in NAD, appears comfortable, no diaphoresis  HEENT: NCAT, MMM, neck soft and supple  CV: +S1/S2, no m/r/g  Resp: CTAB, no w/r/r  GI: normoactive BS, soft, NTND, no rebounding/guarding  Ext: No LE edema, extremities appear warm and well perfused; +left AVF with good bruit/thrill  Neuro: AOx3, no focal deficits, CNII-XII grossly intact  Psych: No SI/HI/AVH, appropriate affect  Skin: no petechiae, ecchymosis or maculopapular rash noted

## 2021-01-27 NOTE — CONSULT NOTE ADULT - ATTENDING COMMENTS
Patient seen and examined. Agree with above. Patient presents with severe anemia in setting of recent dark stools, has a history of gastric ulcers as well as duodenal angioectasia in 2015. Would transfuse today and dialyze. Will plan for EGD/push enteroscopy once patient is adequately resuscitated with PRBCs.
Thank you for allowing me to participate in the care of your patient    For any question, call:  Cell # [phone]  Pager # [phone]  Callback # [phone]

## 2021-01-27 NOTE — ED PROVIDER NOTE - OBJECTIVE STATEMENT
54 y/o M with PMH dialysis on M/W/F at [facility] (last session 1/25) , HTN, HLD, GERD presents to ED with fatigue and SOB. Patient states that for the last day he has been very tired. States that he has also been having SOB worsened with ambulation, but also at rest. Endorses 1 day of darker stools, no gross blood. Denies fever, cough, abdominal pain, vomiting, urinary complaints, +COVID contacts.   NO PCP. Unsure of nephrologist' s name.

## 2021-01-27 NOTE — ED PROVIDER NOTE - PHYSICAL EXAMINATION
GEN: Well Appearing, Nontoxic, NAD  HEENT: NC/AT, Symm Facies. P EOMI  CV: No JVD/Bruits or stridor;  +S1S2, RRR w/o m/g/r  RESP: CTAB w/o w/r/r  ABD: Soft, nt/nd, +BS. No guarding/rebound.   EXT/MSK: No lower extremity edema or calf tenderness. +palpable/equal radial pulses. FROMx4  PSYCH: Appropriate affect and mood   Neuro: Grossly intact, AOX3 with normal speech, no focal deficits  RECTAL: Chaperon ANDRAE Bloom, +dark stool GEN: Well Appearing, Nontoxic, NAD  HEENT: NC/AT, Symm Facies. EOMI  CV: No JVD/Bruits or stridor;  +S1S2, tachycardia   RESP: CTAB w/o w/r/r  ABD: Soft, nt/nd, +BS. No guarding/rebound.   EXT/MSK: No lower extremity edema or calf tenderness. +palpable/equal radial pulses. FROMx4  PSYCH: Appropriate affect and mood   Neuro: Grossly intact, AOX3 with normal speech, no focal deficits  RECTAL: Chaperon ANDRAE Bloom, +dark stool GEN: Well Appearing, Nontoxic, NAD  HEENT: NC/AT, Symm Facies. EOMI  CV: No JVD/Bruits or stridor;  +S1S2, tachycardia   RESP: CTAB w/o w/r/r  ABD: Soft, nt/nd, +BS. No guarding/rebound.   EXT/MSK: No lower extremity edema or calf tenderness. +left upper extremity fistula with palpable thrill. FROMx4  PSYCH: Appropriate affect and mood   Neuro: Grossly intact, AOX3 with normal speech, no focal deficits    RECTAL: Chaperon ANDRAE Bloom, +dark stool

## 2021-01-27 NOTE — PROGRESS NOTE ADULT - SUBJECTIVE AND OBJECTIVE BOX
Patient is a 55y old  Male who presents with a chief complaint of Fatigue (27 Jan 2021 10:02)      SUBJECTIVE / OVERNIGHT EVENTS: Patient seen and examined at bedside. States that he feels tired, denies any CP, SOB, abd pain and n/v. No acute events overnight.     ROS:  All other review of systems negative    Allergies    No Known Allergies    Intolerances        MEDICATIONS  (STANDING):  chlorhexidine 4% Liquid 1 Application(s) Topical once  epoetin ye-epbx (RETACRIT) Injectable 59244 Unit(s) IV Push <User Schedule>  gabapentin 300 milliGRAM(s) Oral daily  nicotine - 21 mG/24Hr(s) Patch 1 patch Transdermal daily  pantoprazole Infusion 8 mG/Hr (10 mL/Hr) IV Continuous <Continuous>  sevelamer carbonate 800 milliGRAM(s) Oral three times a day with meals  simvastatin 20 milliGRAM(s) Oral at bedtime    MEDICATIONS  (PRN):      Vital Signs Last 24 Hrs  T(C): 36.8 (27 Jan 2021 08:30), Max: 37.3 (26 Jan 2021 23:42)  T(F): 98.3 (27 Jan 2021 08:30), Max: 99.2 (26 Jan 2021 23:42)  HR: 83 (27 Jan 2021 08:30) (83 - 100)  BP: 123/76 (27 Jan 2021 08:30) (119/87 - 143/88)  BP(mean): 97 (27 Jan 2021 03:33) (97 - 102)  RR: 18 (27 Jan 2021 08:30) (12 - 20)  SpO2: 99% (27 Jan 2021 08:30) (97% - 100%)  CAPILLARY BLOOD GLUCOSE        I&O's Summary    27 Jan 2021 07:01  -  27 Jan 2021 11:43  --------------------------------------------------------  IN: 300 mL / OUT: 0 mL / NET: 300 mL        PHYSICAL EXAM:  GENERAL: NAD, well-developed  HEAD:  Atraumatic, Normocephalic  EYES: EOMI, PERRLA, conjunctiva and sclera clear  NECK: Supple, No JVD  CHEST/LUNG: Clear to auscultation bilaterally; No wheeze  HEART: Regular rate and rhythm; No murmurs, rubs, or gallops  ABDOMEN: Soft, Nontender, Nondistended; Bowel sounds present  EXTREMITIES:  +LUE AVF,+ thrill, No edema ,  NEUROLOGY: AAOx3, non-focal  PSYCH: calm  SKIN: No rashes or lesions    LABS:                        4.8    10.43 )-----------( 162      ( 27 Jan 2021 00:58 )             14.9     01-27    135  |  97  |  67<H>  ----------------------------<  85  4.3   |  27  |  9.55<H>    Ca    9.0      27 Jan 2021 00:58    TPro  5.5<L>  /  Alb  2.7<L>  /  TBili  0.2  /  DBili  x   /  AST  12  /  ALT  8<L>  /  AlkPhos  72  01-27    PT/INR - ( 27 Jan 2021 01:57 )   PT: 12.6 sec;   INR: 1.05 ratio         PTT - ( 27 Jan 2021 01:57 )  PTT:29.4 sec          RADIOLOGY & ADDITIONAL TESTS:    Consultant(s) Notes Reviewed:  GI and Nephrology rec noted     Care Discussed with Consultants/Other Providers: Medicine NP

## 2021-01-27 NOTE — H&P ADULT - ASSESSMENT
55M with PMHx of ESRD (M/W/F), HTN, & GERD presents to [facility] with two day history of fatigue, dyspnea on exertion and melena. Patient found to have a normocytic anemia with hemoglobin of 4.8 (baseline 8-9). Patient likely suffering from symptomatic anemia due to possible UGIB.

## 2021-01-27 NOTE — ED ADULT NURSE REASSESSMENT NOTE - NS ED NURSE REASSESS COMMENT FT1
Pt resting in stretcher. Pt displays no signs of reaction to blood transfusion. Awaiting bed assignment.

## 2021-01-27 NOTE — PROGRESS NOTE ADULT - ASSESSMENT
55M with PMHx of ESRD (M/W/F), HTN, & GERD presents to [facility] with two day history of fatigue, dyspnea on exertion and melena. Patient found to have a normocytic anemia with hemoglobin of 4.8 (baseline 8-9). Patient likely suffering from symptomatic anemia due to possible UGIB.

## 2021-01-27 NOTE — CONSULT NOTE ADULT - SUBJECTIVE AND OBJECTIVE BOX
[facility] - ОЛЬГА Herron / ОЛЬГА Avilez / INOCENCIA Perales/ ОЛЬГА Corral/ ОЛЬГА Langley/ SUKHWINDER West / NAHUM Francois / ISMAEL Rose  -------------------------------------------------------------------------------------------------------  The patient seen and examined today.  HPI:  55M with PMHx of ESRD (M/W/F), HTN, & GERD presents to [facility] with two day history of fatigue, dyspnea on exertion and melena. Patient states one day prior to admission he began to have more fatigue, decrease exercise tolerance, dyspnea on exertion and "dark" stools. Patient denies abdominal pain, anti-coagulant or anti-platelet use. No known use of NSAIDs or iron supplements. Given fatigue patient is presenting today. He denies sick contacts, cough, or fever. No known COVID-19 contacts. He states something similar to this happened five years ago and he required EGD. Per chart review he had one actively bleeding angioectasia in the duodenum that was cauterized. In the ED patient given Protonix 80 mg and then given 1 unit PRBC.  (27 Jan 2021 04:07)  Patient receives HD via LUE AVF MWF at [facility].    PAST MEDICAL & SURGICAL HISTORY:  HLD (hyperlipidemia)    HTN (hypertension)    Tobacco use    Vitamin D deficiency    Hyperlipidemia    ESRD on hemodialysis    GERD (gastroesophageal reflux disease)    Hypertension    Benign Essential Hypertension    No significant past surgical history    AV fistula  Left upper arm 2/2006    History of open reduction and internal fixation (ORIF) procedure  Left Leg  1990  Hardware subsequently removed. Year unknown      Allergies :- No Known Allergies    Home Medications Reviewed  Hospital Medications:   MEDICATIONS  (STANDING):  chlorhexidine 4% Liquid 1 Application(s) Topical once  epoetin ye-epbx (RETACRIT) Injectable 37484 Unit(s) IV Push <User Schedule>  gabapentin 300 milliGRAM(s) Oral daily  nicotine - 21 mG/24Hr(s) Patch 1 patch Transdermal daily  pantoprazole Infusion 8 mG/Hr (10 mL/Hr) IV Continuous <Continuous>  sevelamer carbonate 800 milliGRAM(s) Oral three times a day with meals  simvastatin 20 milliGRAM(s) Oral at bedtime    SOCIAL HISTORY:  Denies ETOh,Smoking,   FAMILY HISTORY:  Family history of hypertension in mother  HTN    Family history of lymphoma    Family history of chronic renal disease        REVIEW OF SYSTEMS:  CONSTITUTIONAL: Weakness +  EYES/ENT: No visual changes;  No vertigo or throat pain   NECK: No pain or stiffness  RESPIRATORY: No cough, wheezing, hemoptysis; No shortness of breath  CARDIOVASCULAR: No chest pain or palpitations.  GASTROINTESTINAL: Melena ++  GENITOURINARY: No dysuria, frequency, foamy urine, urinary urgency, incontinence or hematuria  NEUROLOGICAL: No numbness or weakness  SKIN: No itching, burning, rashes, or lesions   VASCULAR: No bilateral lower extremity edema.   All other review of systems is negative unless indicated above.    VITALS:  T(F): 98.3 (01-27-21 @ 08:30), Max: 99.2 (01-26-21 @ 23:42)  HR: 83 (01-27-21 @ 08:30)  BP: 123/76 (01-27-21 @ 08:30)  RR: 18 (01-27-21 @ 08:30)  SpO2: 99% (01-27-21 @ 08:30)  Wt(kg): --    Height (cm): 177.8 (01-26 @ 23:42)  Weight (kg): 87 (01-26 @ 23:42)  BMI (kg/m2): 27.5 (01-26 @ 23:42)  BSA (m2): 2.05 (01-26 @ 23:42)    PHYSICAL EXAM:  Constitutional: NAD  HEENT: anicteric sclera, oropharynx clear, MMM  Neck: supple.   Respiratory: Bilateral equal breath sounds , no wheezes, no crackles  Cardiovascular: S1, S2, Regular, Murmur present.  Gastrointestinal: Bowel Sound present, soft, NT/ND  Extremities: No cyanosis or clubbing. No peripheral edema  Neurological: Alert and oriented x 3, no focal deficits  Psychiatric: Normal mood, normal affect  : No CVA tenderness. No rubio.   Skin: No rashes    Data:  01-27    135  |  97  |  67<H>  ----------------------------<  85  4.3   |  27  |  9.55<H>    Ca    9.0      27 Jan 2021 00:58    TPro  5.5<L>  /  Alb  2.7<L>  /  TBili  0.2  /  DBili      /  AST  12  /  ALT  8<L>  /  AlkPhos  72  01-27    Creatinine Trend: 9.55 <--                        4.8    10.43 )-----------( 162      ( 27 Jan 2021 00:58 )             14.9     Urine Studies:

## 2021-01-27 NOTE — H&P ADULT - ATTENDING COMMENTS
Plan explained to patient, he seems slightly tearful.    Removed his nasal cannula and maintaining saturation, continue to monitor volume status & hemodynamics    Slightly elevated troponin likely demand/ESRD

## 2021-01-27 NOTE — ED PROVIDER NOTE - PROGRESS NOTE DETAILS
Spoke with Dr. Self, recommending protonix and 1 unit PRBC/3 hours. Patient signed blood transfusion consent, placed in chart. Spoke with house nephro, states that patient follows with Dr. Perales. Given # [phone]. Spoke with him. Given number for Dr. Corral [phone] and [phone]. Unable to reach providers. Will admit. - Missy Trujillo PA-C

## 2021-01-27 NOTE — H&P ADULT - HISTORY OF PRESENT ILLNESS
55M with PMHx of ESRD (M/W/F), HTN, & GERD presents to [facility] with two day history of fatigue, dyspnea on exertion and melena. Patient states one day prior to admission he began to have more fatigue, decrease exercise tolerance, dyspnea on exertion and "dark" stools. Patient denies abdominal pain, anti-coagulant or anti-platelet use. No known use of NSAIDs or iron supplements. Given fatigue patient is presenting today. He denies sick contacts, cough, or fever. No known COVID-19 contacts. He states something similar to this happened five years ago and he required EGD. Per chart review he had one actively bleeding angioectasia in the duodenum that was cauterized. In the ED patient given Protonix 80 mg and then given 1 unit PRBC.

## 2021-01-27 NOTE — H&P ADULT - PROBLEM SELECTOR PLAN 2
-Consult [facility] for maintenance HD, ideally would like to dialyze prior to possible procedure, would also check CBC prior to dialysis since pRBC can be given during

## 2021-01-27 NOTE — CONSULT NOTE ADULT - ASSESSMENT
Impression:  55M with PMHx of ESRD (M/W/F), HTN, & GERD presents to [facility] with two day history of fatigue, dyspnea on exertion and dark stools found to have anemia  #Anemia - likely from upper GI bleed given dark stools.  DDx includes PUD, angiectasia, malignancy, esophoagitis vs small bowel angiectasia or lesions in the right side of the colon patient is HD stable currently    Recommendations:   - monitor hemoglobin   - transfuse as needed   - two active IVs at all times   - active type and screen   - IV PPI gtt   - keep NPO for now   - EGD today or tomorrow based on timing of blood transfusions, repeat CBC and dialysis most likely to be tomorrow     Felicita Sher  Gastroenterology Fellow  Pager x [phone] or [phone]  Please page on-call Fellow on weekends/holidays or after 5 pm and before 8 am on weekdays   On-call GI fellow can be contacted via the answering service ([phone])   Impression:  55M with PMHx of ESRD (M/W/F), HTN, & GERD presents to [facility] with two day history of fatigue, dyspnea on exertion and dark stools found to have anemia  #Anemia - likely from upper GI bleed given dark stools.  DDx includes PUD, angiectasia, malignancy, esophoagitis vs small bowel angiectasia or lesions in the right side of the colon patient is HD stable currently    Recommendations:   - monitor hemoglobin   - transfuse as needed   - two active IVs at all times   - active type and screen   - Add on iron studies   - IV PPI gtt   - keep NPO for now   - EGD/push enteroscopy today or tomorrow based on timing of blood transfusions, repeat CBC and dialysis most likely to be tomorrow    - If no finding on evaluation of upper GI tract, will need colonoscopy +/- capsule endoscopy    Felicita Sher  Gastroenterology Fellow  Pager x [phone] or [phone]  Please page on-call Fellow on weekends/holidays or after 5 pm and before 8 am on weekdays   On-call GI fellow can be contacted via the answering service ([phone])

## 2021-01-27 NOTE — H&P ADULT - NSHPREVIEWOFSYSTEMS_GEN_ALL_CORE
Gen: no night sweats or change in appetite  Eyes: no changes in vision or diplopia   ENT: no epistaxis, sinus pain, gingival bleeding, odynophagia or dysphagia  CV: no CP, PND or palpitations  Resp: no cough, wheezing, or hemoptysis  GI: no hematemesis, hematochezia, or melena  : no dysuria, polyuria, or hematuria  MSK: no arthralgias or joint swelling   Neuro: no gross sensory changes, numbness, focal deficits  Psych: no depression or changes in concentration  Heme/Onc: no purpura, petechiae or night sweats  Skin: no pruritus, hair loss or skin lesions  All: no photosensitivity, no complaints of anaphylaxis (SOB, throat swelling) Gen: no night sweats or change in appetite  Eyes: no changes in vision or diplopia   ENT: no epistaxis, sinus pain, gingival bleeding, odynophagia or dysphagia  CV: no CP, PND or palpitations  Resp: no cough, wheezing, or hemoptysis; +MORIN  GI: no hematemesis, hematochezia; +Melena  : no dysuria, polyuria, or hematuria  MSK: no arthralgias or joint swelling   Neuro: no gross sensory changes, numbness, focal deficits  Psych: no depression or changes in concentration  Heme/Onc: no purpura, petechiae or night sweats  Skin: no pruritus, hair loss or skin lesions  All: no photosensitivity, no complaints of anaphylaxis (SOB, throat swelling)

## 2021-01-27 NOTE — CONSULT NOTE ADULT - SUBJECTIVE AND OBJECTIVE BOX
Chief Complaint:  Patient is a 55y old  Male who presents with a chief complaint of Fatigue (27 Jan 2021 04:07)      HPI:  The patient is a 55M with PMHx of ESRD (M/W/F), HTN, & GERD presents to [facility] with two day history of fatigue, dyspnea on exertion and dark stools. The patient states one day prior to admission he began to have more fatigue, decrease exercise tolerance, dyspnea on exertion and "dark" stools. Patient denies abdominal pain, anti-coagulant or anti-platelet use. No known use of NSAIDs or iron supplements.  He states something similar to this happened five years ago and he required EGD.  This EGd was done here in 2015 and showed One non-obstructing duodenal ulcer with clean base already with clips in place, no active bleeding.  EGD done after positive bleeding scan at that time.  No biopsies taken.    In the ED VSS and the patient given Protonix 80 mg and then given 1 unit PRBC. Hemoglobin was 4.8 from 8.9 in december (no repeat yet) normal coags.    Allergies:  No Known Allergies      Home Medications:    Hospital Medications:  gabapentin 300 milliGRAM(s) Oral daily  nicotine - 21 mG/24Hr(s) Patch 1 patch Transdermal daily  pantoprazole  Injectable 40 milliGRAM(s) IV Push two times a day  sevelamer carbonate 800 milliGRAM(s) Oral three times a day with meals  simvastatin 20 milliGRAM(s) Oral at bedtime      PMHX/PSHX:  ESRD (end stage renal disease) on dialysis    HLD (hyperlipidemia)    HTN (hypertension)    Tobacco use    Vitamin D deficiency    Hyperlipidemia    ESRD on hemodialysis    GERD (gastroesophageal reflux disease)    Hypertension    End Stage Renal Disease    Benign Essential Hypertension    No significant past surgical history    AV fistula    History of open reduction and internal fixation (ORIF) procedure        Family history:  Family history of hypertension in mother    Family history of lymphoma    Family history of chronic renal disease    Family history of lymphoma        Social History:     ROS:     General:  No wt loss, fevers, chills, night sweats, fatigue,   Eyes:  Good vision, no reported pain  ENT:  No sore throat, pain, runny nose, dysphagia  CV:  No pain, palpitations, hypo/hypertension  Resp:  No dyspnea, cough, tachypnea, wheezing  GI:  See HPI  :  No pain, bleeding, incontinence, nocturia  Muscle:  No pain, weakness  Neuro:  No weakness, tingling, memory problems  Psych:  No fatigue, insomnia, mood problems, depression  Endocrine:  No polyuria, polydipsia, cold/heat intolerance  Heme:  No petechiae, ecchymosis, easy bruisability  Skin:  No rash, edema      PHYSICAL EXAM:     GENERAL:  NAD  CHEST:  Full & symmetric excursion  HEART:  Regular rhythm, no abdominal bruit, no edema  ABDOMEN:  Soft, non-tender, non-distended, normoactive bowel sounds,  no masses , no hepatosplenomegaly  EXTREMITIES:  no cyanosis,clubbing or edema  SKIN:  No rash/erythema/ecchymoses/petechiae/wounds/abscess/warm/dry  NEURO:  Alert, oriented    Vital Signs:  Vital Signs Last 24 Hrs  T(C): 36.8 (27 Jan 2021 06:30), Max: 37.3 (26 Jan 2021 23:42)  T(F): 98.3 (27 Jan 2021 06:30), Max: 99.2 (26 Jan 2021 23:42)  HR: 97 (27 Jan 2021 06:30) (93 - 100)  BP: 134/85 (27 Jan 2021 06:30) (119/87 - 143/88)  BP(mean): 97 (27 Jan 2021 03:33) (97 - 102)  RR: 20 (27 Jan 2021 06:30) (12 - 20)  SpO2: 97% (27 Jan 2021 06:30) (97% - 100%)  Daily Height in cm: 177.8 (26 Jan 2021 23:42)    Daily     LABS:                        4.8    10.43 )-----------( 162      ( 27 Jan 2021 00:58 )             14.9     01-27    135  |  97  |  67<H>  ----------------------------<  85  4.3   |  27  |  9.55<H>    Ca    9.0      27 Jan 2021 00:58    TPro  5.5<L>  /  Alb  2.7<L>  /  TBili  0.2  /  DBili  x   /  AST  12  /  ALT  8<L>  /  AlkPhos  72  01-27    LIVER FUNCTIONS - ( 27 Jan 2021 00:58 )  Alb: 2.7 g/dL / Pro: 5.5 g/dL / ALK PHOS: 72 U/L / ALT: 8 U/L / AST: 12 U/L / GGT: x           PT/INR - ( 27 Jan 2021 01:57 )   PT: 12.6 sec;   INR: 1.05 ratio         PTT - ( 27 Jan 2021 01:57 )  PTT:29.4 sec        Imaging:           Chief Complaint:  Patient is a 55y old  Male who presents with a chief complaint of Fatigue (27 Jan 2021 04:07)      HPI:  The patient is a 55M with PMHx of ESRD (M/W/F), HTN, & GERD presents to [facility] with two day history of fatigue, dyspnea on exertion and dark stools. The patient states one day prior to admission he began to have more fatigue, decrease exercise tolerance, dyspnea on exertion and "dark" stools. Patient denies abdominal pain, anti-coagulant or anti-platelet use. No known use of NSAIDs or iron supplements.  He states something similar to this happened five years ago and he required EGD.  This EGd was done here in 2015 and showed One non-obstructing duodenal ulcer with clean base already with clips in place, no active bleeding.  EGD done after positive bleeding scan at that time.  No biopsies taken.    In the ED VSS and the patient given Protonix 80 mg and then given 1 unit PRBC. Hemoglobin was 4.8 from 8.9 in december (no repeat yet) normal coags.    Allergies:  No Known Allergies      Home Medications:    Hospital Medications:  gabapentin 300 milliGRAM(s) Oral daily  nicotine - 21 mG/24Hr(s) Patch 1 patch Transdermal daily  pantoprazole  Injectable 40 milliGRAM(s) IV Push two times a day  sevelamer carbonate 800 milliGRAM(s) Oral three times a day with meals  simvastatin 20 milliGRAM(s) Oral at bedtime      PMHX/PSHX:  ESRD (end stage renal disease) on dialysis    HLD (hyperlipidemia)    HTN (hypertension)    Tobacco use    Vitamin D deficiency    Hyperlipidemia    ESRD on hemodialysis    GERD (gastroesophageal reflux disease)    Hypertension    End Stage Renal Disease    Benign Essential Hypertension    No significant past surgical history    AV fistula    History of open reduction and internal fixation (ORIF) procedure        Family history:  Family history of hypertension in mother    Family history of lymphoma    Family history of chronic renal disease    Family history of lymphoma        Social History: No smoking or illicit drugs    ROS:     General:  No wt loss, fevers, chills, night sweats, fatigue,   Eyes:  Good vision, no reported pain  ENT:  No sore throat, pain, runny nose, dysphagia  CV:  No pain, palpitations, hypo/hypertension  Resp:  No dyspnea, cough, tachypnea, wheezing  GI:  See HPI  :  No pain, bleeding, incontinence, nocturia  Muscle:  No pain, weakness  Neuro:  No weakness, tingling, memory problems  Psych:  No fatigue, insomnia, mood problems, depression  Endocrine:  No polyuria, polydipsia, cold/heat intolerance  Heme:  No petechiae, ecchymosis, easy bruisability  Skin:  No rash, edema      PHYSICAL EXAM:     GENERAL:  NAD  CHEST:  Full & symmetric excursion  HEART:  Regular rhythm, no abdominal bruit, no edema  ABDOMEN:  Soft, non-tender, non-distended, normoactive bowel sounds,  no masses , no hepatosplenomegaly  EXTREMITIES:  no cyanosis,clubbing or edema  SKIN:  No rash/erythema/ecchymoses/petechiae/wounds/abscess/warm/dry  NEURO:  Alert, oriented  RECTAL :Unable, patient in [place] in ER    Vital Signs:  Vital Signs Last 24 Hrs  T(C): 36.8 (27 Jan 2021 06:30), Max: 37.3 (26 Jan 2021 23:42)  T(F): 98.3 (27 Jan 2021 06:30), Max: 99.2 (26 Jan 2021 23:42)  HR: 97 (27 Jan 2021 06:30) (93 - 100)  BP: 134/85 (27 Jan 2021 06:30) (119/87 - 143/88)  BP(mean): 97 (27 Jan 2021 03:33) (97 - 102)  RR: 20 (27 Jan 2021 06:30) (12 - 20)  SpO2: 97% (27 Jan 2021 06:30) (97% - 100%)  Daily Height in cm: 177.8 (26 Jan 2021 23:42)    Daily     LABS:                        4.8    10.43 )-----------( 162      ( 27 Jan 2021 00:58 )             14.9     01-27    135  |  97  |  67<H>  ----------------------------<  85  4.3   |  27  |  9.55<H>    Ca    9.0      27 Jan 2021 00:58    TPro  5.5<L>  /  Alb  2.7<L>  /  TBili  0.2  /  DBili  x   /  AST  12  /  ALT  8<L>  /  AlkPhos  72  01-27    LIVER FUNCTIONS - ( 27 Jan 2021 00:58 )  Alb: 2.7 g/dL / Pro: 5.5 g/dL / ALK PHOS: 72 U/L / ALT: 8 U/L / AST: 12 U/L / GGT: x           PT/INR - ( 27 Jan 2021 01:57 )   PT: 12.6 sec;   INR: 1.05 ratio         PTT - ( 27 Jan 2021 01:57 )  PTT:29.4 sec        Imaging:

## 2021-01-27 NOTE — ED PROVIDER NOTE - ATTENDING CONTRIBUTION TO CARE
RGUJRAL 54yo male hx listed presents with generalized weakness x 2 d. States since last night has overall fatigue and SOB w exertion. Denies chest pain, palp, abd pain. No n/v/d. Pt's last dialysis yesterday completed session. Pt does endorse dark stools x 2 days.   On exam, Patient is awake,alert,oriented x 3. Patient is well appearing and in no acute distress. Patient's chest is clear to ausculation, +s1s2. Abdomen is soft nd/nt +BS. Extremity with no swelling or calf tenderness. LUE + AV fistula.   Check labs, guaiac and xray chest.   Eval for volume overload, anemia and infection.

## 2021-01-28 ENCOUNTER — RESULT REVIEW (OUTPATIENT)
Age: 56
End: 2021-01-28

## 2021-01-28 LAB
ANION GAP SERPL CALC-SCNC: 14 MMOL/L — SIGNIFICANT CHANGE UP (ref 5–17)
BUN SERPL-MCNC: 44 MG/DL — HIGH (ref 7–23)
CALCIUM SERPL-MCNC: 8.8 MG/DL — SIGNIFICANT CHANGE UP (ref 8.4–10.5)
CHLORIDE SERPL-SCNC: 98 MMOL/L — SIGNIFICANT CHANGE UP (ref 96–108)
CO2 SERPL-SCNC: 26 MMOL/L — SIGNIFICANT CHANGE UP (ref 22–31)
CREAT SERPL-MCNC: 7.79 MG/DL — HIGH (ref 0.5–1.3)
GLUCOSE SERPL-MCNC: 68 MG/DL — LOW (ref 70–99)
HCT VFR BLD CALC: 22.1 % — LOW (ref 39–50)
HCT VFR BLD CALC: 22.2 % — LOW (ref 39–50)
HCT VFR BLD CALC: 23.2 % — LOW (ref 39–50)
HGB BLD-MCNC: 7.2 G/DL — LOW (ref 13–17)
HGB BLD-MCNC: 7.2 G/DL — LOW (ref 13–17)
HGB BLD-MCNC: 7.6 G/DL — LOW (ref 13–17)
MCHC RBC-ENTMCNC: 29.3 PG — SIGNIFICANT CHANGE UP (ref 27–34)
MCHC RBC-ENTMCNC: 29.8 PG — SIGNIFICANT CHANGE UP (ref 27–34)
MCHC RBC-ENTMCNC: 29.9 PG — SIGNIFICANT CHANGE UP (ref 27–34)
MCHC RBC-ENTMCNC: 32.4 GM/DL — SIGNIFICANT CHANGE UP (ref 32–36)
MCHC RBC-ENTMCNC: 32.6 GM/DL — SIGNIFICANT CHANGE UP (ref 32–36)
MCHC RBC-ENTMCNC: 32.8 GM/DL — SIGNIFICANT CHANGE UP (ref 32–36)
MCV RBC AUTO: 90.2 FL — SIGNIFICANT CHANGE UP (ref 80–100)
MCV RBC AUTO: 91 FL — SIGNIFICANT CHANGE UP (ref 80–100)
MCV RBC AUTO: 91.7 FL — SIGNIFICANT CHANGE UP (ref 80–100)
NRBC # BLD: 0 /100 WBCS — SIGNIFICANT CHANGE UP (ref 0–0)
PLATELET # BLD AUTO: 125 K/UL — LOW (ref 150–400)
PLATELET # BLD AUTO: 133 K/UL — LOW (ref 150–400)
PLATELET # BLD AUTO: 135 K/UL — LOW (ref 150–400)
POTASSIUM SERPL-MCNC: 4.1 MMOL/L — SIGNIFICANT CHANGE UP (ref 3.5–5.3)
POTASSIUM SERPL-SCNC: 4.1 MMOL/L — SIGNIFICANT CHANGE UP (ref 3.5–5.3)
RBC # BLD: 2.41 M/UL — LOW (ref 4.2–5.8)
RBC # BLD: 2.46 M/UL — LOW (ref 4.2–5.8)
RBC # BLD: 2.55 M/UL — LOW (ref 4.2–5.8)
RBC # FLD: 17.6 % — HIGH (ref 10.3–14.5)
RBC # FLD: 17.8 % — HIGH (ref 10.3–14.5)
RBC # FLD: 18.2 % — HIGH (ref 10.3–14.5)
SODIUM SERPL-SCNC: 138 MMOL/L — SIGNIFICANT CHANGE UP (ref 135–145)
WBC # BLD: 9.03 K/UL — SIGNIFICANT CHANGE UP (ref 3.8–10.5)
WBC # BLD: 9.16 K/UL — SIGNIFICANT CHANGE UP (ref 3.8–10.5)
WBC # BLD: 9.5 K/UL — SIGNIFICANT CHANGE UP (ref 3.8–10.5)
WBC # FLD AUTO: 9.03 K/UL — SIGNIFICANT CHANGE UP (ref 3.8–10.5)
WBC # FLD AUTO: 9.16 K/UL — SIGNIFICANT CHANGE UP (ref 3.8–10.5)
WBC # FLD AUTO: 9.5 K/UL — SIGNIFICANT CHANGE UP (ref 3.8–10.5)

## 2021-01-28 PROCEDURE — 88313 SPECIAL STAINS GROUP 2: CPT | Mod: 26

## 2021-01-28 PROCEDURE — 44360 SMALL BOWEL ENDOSCOPY: CPT | Mod: GC

## 2021-01-28 PROCEDURE — 88305 TISSUE EXAM BY PATHOLOGIST: CPT | Mod: 26

## 2021-01-28 PROCEDURE — 88312 SPECIAL STAINS GROUP 1: CPT | Mod: 26

## 2021-01-28 PROCEDURE — 99233 SBSQ HOSP IP/OBS HIGH 50: CPT

## 2021-01-28 RX ORDER — SOD SULF/SODIUM/NAHCO3/KCL/PEG
4000 SOLUTION, RECONSTITUTED, ORAL ORAL ONCE
Refills: 0 | Status: COMPLETED | OUTPATIENT
Start: 2021-01-28 | End: 2021-01-28

## 2021-01-28 RX ORDER — SODIUM CHLORIDE 9 MG/ML
500 INJECTION INTRAMUSCULAR; INTRAVENOUS; SUBCUTANEOUS
Refills: 0 | Status: COMPLETED | OUTPATIENT
Start: 2021-01-28 | End: 2021-02-01

## 2021-01-28 RX ORDER — SODIUM CHLORIDE 9 MG/ML
500 INJECTION INTRAMUSCULAR; INTRAVENOUS; SUBCUTANEOUS
Refills: 0 | Status: DISCONTINUED | OUTPATIENT
Start: 2021-02-01 | End: 2021-02-01

## 2021-01-28 RX ADMIN — Medication 1 PATCH: at 08:56

## 2021-01-28 RX ADMIN — Medication 1 PATCH: at 18:03

## 2021-01-28 RX ADMIN — PANTOPRAZOLE SODIUM 10 MG/HR: 20 TABLET, DELAYED RELEASE ORAL at 06:30

## 2021-01-28 RX ADMIN — Medication 5 MILLIGRAM(S): at 23:30

## 2021-01-28 RX ADMIN — Medication 1 PATCH: at 12:50

## 2021-01-28 RX ADMIN — Medication 1 PATCH: at 12:51

## 2021-01-28 RX ADMIN — GABAPENTIN 300 MILLIGRAM(S): 400 CAPSULE ORAL at 12:51

## 2021-01-28 RX ADMIN — SIMVASTATIN 20 MILLIGRAM(S): 20 TABLET, FILM COATED ORAL at 21:10

## 2021-01-28 RX ADMIN — Medication 4000 MILLILITER(S): at 16:53

## 2021-01-28 NOTE — PROGRESS NOTE ADULT - ASSESSMENT
55M with PMHx of ESRD (M/W/F), HTN, & GERD presents to [facility] with two day history of fatigue, dyspnea on exertion and melena. Patient found to have a normocytic anemia with hemoglobin of 4.8 (baseline 8-9). Patient likely suffering from symptomatic anemia due to possible UGIB.

## 2021-01-28 NOTE — PROGRESS NOTE ADULT - SUBJECTIVE AND OBJECTIVE BOX
Patient is a 55y old  Male who presents with a chief complaint of Fatigue (27 Jan 2021 11:43)      SUBJECTIVE / OVERNIGHT EVENTS: Patient seen early this morning, denies any CP, SOB, abd pain and n/v. He is upset that he hasn't had anything to eat for 2 days. no acute events overnight.     ROS:  All other review of systems negative    Allergies    No Known Allergies    Intolerances        MEDICATIONS  (STANDING):  chlorhexidine 4% Liquid 1 Application(s) Topical once  epoetin ye-epbx (RETACRIT) Injectable 69025 Unit(s) IV Push <User Schedule>  gabapentin 300 milliGRAM(s) Oral daily  influenza   Vaccine 0.5 milliLiter(s) IntraMuscular once  nicotine - 21 mG/24Hr(s) Patch 1 patch Transdermal daily  pantoprazole Infusion 8 mG/Hr (10 mL/Hr) IV Continuous <Continuous>  sevelamer carbonate 800 milliGRAM(s) Oral three times a day with meals  simvastatin 20 milliGRAM(s) Oral at bedtime  sodium chloride 0.9%. 500 milliLiter(s) (30 mL/Hr) IV Continuous <Continuous>    MEDICATIONS  (PRN):      Vital Signs Last 24 Hrs  T(C): 36.9 (28 Jan 2021 13:00), Max: 37.4 (27 Jan 2021 20:57)  T(F): 98.5 (28 Jan 2021 13:00), Max: 99.4 (27 Jan 2021 20:57)  HR: 98 (28 Jan 2021 13:00) (84 - 101)  BP: 124/83 (28 Jan 2021 13:00) (112/67 - 162/91)  BP(mean): --  RR: 18 (28 Jan 2021 13:00) (13 - 20)  SpO2: 99% (28 Jan 2021 13:00) (95% - 100%)  CAPILLARY BLOOD GLUCOSE        I&O's Summary    27 Jan 2021 07:01  -  28 Jan 2021 07:00  --------------------------------------------------------  IN: 420 mL / OUT: 0 mL / NET: 420 mL        PHYSICAL EXAM:  GENERAL: NAD, well-developed  HEAD:  Atraumatic, Normocephalic  EYES: EOMI, PERRLA, conjunctiva and sclera clear  NECK: Supple, No JVD  CHEST/LUNG: Clear to auscultation bilaterally; No wheeze  HEART: Regular rate and rhythm; No murmurs, rubs, or gallops  ABDOMEN: Soft, Nontender, Nondistended; Bowel sounds present  EXTREMITIES:  +LUE AVF,+ thrill, No edema ,  NEUROLOGY: AAOx3, non-focal  PSYCH: calm  SKIN: No rashes or lesions      LABS:                        7.2    9.16  )-----------( 135      ( 28 Jan 2021 06:55 )             22.1     01-28    138  |  98  |  44<H>  ----------------------------<  68<L>  4.1   |  26  |  7.79<H>    Ca    8.8      28 Jan 2021 06:55    TPro  5.5<L>  /  Alb  2.7<L>  /  TBili  0.2  /  DBili  x   /  AST  12  /  ALT  8<L>  /  AlkPhos  72  01-27    PT/INR - ( 27 Jan 2021 01:57 )   PT: 12.6 sec;   INR: 1.05 ratio         PTT - ( 27 Jan 2021 01:57 )  PTT:29.4 sec          RADIOLOGY & ADDITIONAL TESTS:    Imaging Personally Reviewed:  enteroscopy  Impression:          - Esophageal polyp(s) were found. Biopsied.                       - Small hiatal hernia.                       - Widely patent Schatzki ring.                       - Normal stomach.                       - Mucosal changes in the duodenum.                       - Mucosal changes in the duodenum.                       - The examined portion of the jejunum was normal.                       - No bleeding seen on this exam of the upper GI tract.    Recommendation:      - [facility].                       - Clear liquid diet today. Plan for colonoscopy tomorrow +/- capsule                        endoscopy.                       - Await pathology results.      Care Discussed with Consultants/Other Providers: Medicine nP

## 2021-01-29 DIAGNOSIS — F43.20 ADJUSTMENT DISORDER, UNSPECIFIED: ICD-10-CM

## 2021-01-29 DIAGNOSIS — R45.89 OTHER SYMPTOMS AND SIGNS INVOLVING EMOTIONAL STATE: ICD-10-CM

## 2021-01-29 LAB
ANION GAP SERPL CALC-SCNC: 13 MMOL/L — SIGNIFICANT CHANGE UP (ref 5–17)
BLD GP AB SCN SERPL QL: NEGATIVE — SIGNIFICANT CHANGE UP
BUN SERPL-MCNC: 54 MG/DL — HIGH (ref 7–23)
CALCIUM SERPL-MCNC: 8.5 MG/DL — SIGNIFICANT CHANGE UP (ref 8.4–10.5)
CHLORIDE SERPL-SCNC: 96 MMOL/L — SIGNIFICANT CHANGE UP (ref 96–108)
CO2 SERPL-SCNC: 27 MMOL/L — SIGNIFICANT CHANGE UP (ref 22–31)
CREAT SERPL-MCNC: 11.01 MG/DL — HIGH (ref 0.5–1.3)
GLUCOSE SERPL-MCNC: 98 MG/DL — SIGNIFICANT CHANGE UP (ref 70–99)
HCT VFR BLD CALC: 20.9 % — CRITICAL LOW (ref 39–50)
HCT VFR BLD CALC: 22.5 % — LOW (ref 39–50)
HCT VFR BLD CALC: 22.9 % — LOW (ref 39–50)
HGB BLD-MCNC: 6.9 G/DL — CRITICAL LOW (ref 13–17)
HGB BLD-MCNC: 7.4 G/DL — LOW (ref 13–17)
HGB BLD-MCNC: 7.5 G/DL — LOW (ref 13–17)
MCHC RBC-ENTMCNC: 29.5 PG — SIGNIFICANT CHANGE UP (ref 27–34)
MCHC RBC-ENTMCNC: 30.1 PG — SIGNIFICANT CHANGE UP (ref 27–34)
MCHC RBC-ENTMCNC: 30.2 PG — SIGNIFICANT CHANGE UP (ref 27–34)
MCHC RBC-ENTMCNC: 32.3 GM/DL — SIGNIFICANT CHANGE UP (ref 32–36)
MCHC RBC-ENTMCNC: 33 GM/DL — SIGNIFICANT CHANGE UP (ref 32–36)
MCHC RBC-ENTMCNC: 33.3 GM/DL — SIGNIFICANT CHANGE UP (ref 32–36)
MCV RBC AUTO: 90.7 FL — SIGNIFICANT CHANGE UP (ref 80–100)
MCV RBC AUTO: 91.2 FL — SIGNIFICANT CHANGE UP (ref 80–100)
MCV RBC AUTO: 91.3 FL — SIGNIFICANT CHANGE UP (ref 80–100)
NRBC # BLD: 0 /100 WBCS — SIGNIFICANT CHANGE UP (ref 0–0)
PLATELET # BLD AUTO: 117 K/UL — LOW (ref 150–400)
PLATELET # BLD AUTO: 121 K/UL — LOW (ref 150–400)
PLATELET # BLD AUTO: 123 K/UL — LOW (ref 150–400)
POTASSIUM SERPL-MCNC: 4.3 MMOL/L — SIGNIFICANT CHANGE UP (ref 3.5–5.3)
POTASSIUM SERPL-SCNC: 4.3 MMOL/L — SIGNIFICANT CHANGE UP (ref 3.5–5.3)
RBC # BLD: 2.29 M/UL — LOW (ref 4.2–5.8)
RBC # BLD: 2.48 M/UL — LOW (ref 4.2–5.8)
RBC # BLD: 2.51 M/UL — LOW (ref 4.2–5.8)
RBC # FLD: 17.1 % — HIGH (ref 10.3–14.5)
RBC # FLD: 17.2 % — HIGH (ref 10.3–14.5)
RBC # FLD: 17.5 % — HIGH (ref 10.3–14.5)
RH IG SCN BLD-IMP: POSITIVE — SIGNIFICANT CHANGE UP
SODIUM SERPL-SCNC: 136 MMOL/L — SIGNIFICANT CHANGE UP (ref 135–145)
WBC # BLD: 7.81 K/UL — SIGNIFICANT CHANGE UP (ref 3.8–10.5)
WBC # BLD: 7.84 K/UL — SIGNIFICANT CHANGE UP (ref 3.8–10.5)
WBC # BLD: 8.76 K/UL — SIGNIFICANT CHANGE UP (ref 3.8–10.5)
WBC # FLD AUTO: 7.81 K/UL — SIGNIFICANT CHANGE UP (ref 3.8–10.5)
WBC # FLD AUTO: 7.84 K/UL — SIGNIFICANT CHANGE UP (ref 3.8–10.5)
WBC # FLD AUTO: 8.76 K/UL — SIGNIFICANT CHANGE UP (ref 3.8–10.5)

## 2021-01-29 PROCEDURE — 99222 1ST HOSP IP/OBS MODERATE 55: CPT

## 2021-01-29 PROCEDURE — 99232 SBSQ HOSP IP/OBS MODERATE 35: CPT | Mod: GC

## 2021-01-29 PROCEDURE — 99233 SBSQ HOSP IP/OBS HIGH 50: CPT

## 2021-01-29 RX ORDER — PANTOPRAZOLE SODIUM 20 MG/1
40 TABLET, DELAYED RELEASE ORAL EVERY 12 HOURS
Refills: 0 | Status: DISCONTINUED | OUTPATIENT
Start: 2021-01-29 | End: 2021-02-01

## 2021-01-29 RX ADMIN — Medication 1 PATCH: at 12:43

## 2021-01-29 RX ADMIN — Medication 1 PATCH: at 12:44

## 2021-01-29 RX ADMIN — Medication 1 PATCH: at 08:59

## 2021-01-29 RX ADMIN — PANTOPRAZOLE SODIUM 10 MG/HR: 20 TABLET, DELAYED RELEASE ORAL at 06:58

## 2021-01-29 RX ADMIN — Medication 10 MILLIGRAM(S): at 05:29

## 2021-01-29 RX ADMIN — ERYTHROPOIETIN 20000 UNIT(S): 10000 INJECTION, SOLUTION INTRAVENOUS; SUBCUTANEOUS at 13:27

## 2021-01-29 RX ADMIN — Medication 1 PATCH: at 18:34

## 2021-01-29 NOTE — BEHAVIORAL HEALTH ASSESSMENT NOTE - HPI (INCLUDE ILLNESS QUALITY, SEVERITY, DURATION, TIMING, CONTEXT, MODIFYING FACTORS, ASSOCIATED SIGNS AND SYMPTOMS)
Pt is a 54 yo male domiciled alone w/ no significant PPHx, PMHx significant for ESRD on HD who presents to [facility] 1/26 with active GI bleed, Hgb 4 now s/p 4 units PRBCs. Psychiatry was consulted for evaluation of capacity following report that pt was refusing HD, transfusions, and endoscopy this morning 1/29.    Pt interviewed at bedside. Pt states he is agreeable to complying with medical team's recommendations. States he has been on dialysis for over 15 years and has continued to undergo HD during his current hospital stay.  States the reason why he didn't go for endoscopy today is because the team canceled it yesterday and is willing to undergo it as soon as possible. When asked about the risks and benefits of the procedures, pt replies "If I don't get it done, I could keep bleeding" without further elaboration. Pt denies any depression, anxiety, hallucinations, manic symptoms, no SI/SA, no HI/HA, past psychiatric evaluation, family hx of psychiatric illness, access to firearms, any drug or alcohol abuse. Pt is a 56 yo male domiciled alone w/ no significant PPHx, PMHx significant for ESRD on HD who presents to [facility] 1/26 with active GI bleed, Hgb 4 now s/p 4 units PRBCs. Psychiatry was consulted for evaluation of capacity following report that pt was refusing HD, transfusions, and endoscopy this morning 1/29.     Pt interviewed at bedside. Pt poor historian, guarded at times. States the reason why he didn't go for endoscopy today is because the team canceled it yesterday and is willing to undergo it as soon as possible. When asked about the risks and benefits of the procedures, pt replies "If I don't get it done, I could keep bleeding" without further elaboration. Pt states he is agreeable to have the endoscopy, colonoscpy and dialysis done. Pt states he was on dialysis for many years, 15 years approx.   Pt denies any depression, anxiety, hallucinations, manic symptoms, no SI/SA, no HI/HA, past psychiatric evaluation, family hx of psychiatric illness, access to firearms, any drug or alcohol abuse.

## 2021-01-29 NOTE — PROGRESS NOTE ADULT - SUBJECTIVE AND OBJECTIVE BOX
Patient is a 55y old  Male who presents with a chief complaint of Fatigue (29 Jan 2021 08:41)      SUBJECTIVE / OVERNIGHT EVENTS: Patient seen and examined at bedside. States he is hungry and wants to eat, he was agitated this morning, refusing HS and IV meds, states there is not point of him being in the hospital. Denies any CP, SOB, abd pain and n/v.     ROS:  All other review of systems negative    Allergies    No Known Allergies    Intolerances        MEDICATIONS  (STANDING):  chlorhexidine 4% Liquid 1 Application(s) Topical once  epoetin ye-epbx (RETACRIT) Injectable 21093 Unit(s) IV Push <User Schedule>  gabapentin 300 milliGRAM(s) Oral daily  influenza   Vaccine 0.5 milliLiter(s) IntraMuscular once  nicotine - 21 mG/24Hr(s) Patch 1 patch Transdermal daily  pantoprazole Infusion 8 mG/Hr (10 mL/Hr) IV Continuous <Continuous>  sevelamer carbonate 800 milliGRAM(s) Oral three times a day with meals  simvastatin 20 milliGRAM(s) Oral at bedtime  sodium chloride 0.9%. 500 milliLiter(s) (30 mL/Hr) IV Continuous <Continuous>    MEDICATIONS  (PRN):  bisacodyl 5 milliGRAM(s) Oral once PRN Constipation      Vital Signs Last 24 Hrs  T(C): 36.6 (29 Jan 2021 06:00), Max: 37.3 (28 Jan 2021 17:42)  T(F): 97.9 (29 Jan 2021 06:00), Max: 99.1 (28 Jan 2021 17:42)  HR: 93 (29 Jan 2021 06:00) (76 - 98)  BP: 154/82 (29 Jan 2021 06:00) (124/83 - 157/72)  BP(mean): --  RR: 16 (29 Jan 2021 06:00) (16 - 18)  SpO2: 100% (29 Jan 2021 06:00) (97% - 100%)  CAPILLARY BLOOD GLUCOSE        I&O's Summary    28 Jan 2021 07:01  -  29 Jan 2021 07:00  --------------------------------------------------------  IN: 180 mL / OUT: 0 mL / NET: 180 mL        PHYSICAL EXAM:  GENERAL: NAD, well-developed  HEAD:  Atraumatic, Normocephalic  EYES: EOMI, PERRLA, conjunctiva and sclera clear  NECK: Supple, No JVD  CHEST/LUNG: Clear to auscultation bilaterally; No wheeze  HEART: Regular rate and rhythm; No murmurs, rubs, or gallops  ABDOMEN: Soft, Nontender, Nondistended; Bowel sounds present  EXTREMITIES:  +LUE AVF,+ thrill, No edema ,  NEUROLOGY: AAOx3, non-focal  PSYCH: flat affect  SKIN: No rashes or lesions      LABS:                        7.4    7.84  )-----------( 117      ( 29 Jan 2021 09:09 )             22.9     01-29    136  |  96  |  54<H>  ----------------------------<  98  4.3   |  27  |  11.01<H>    Ca    8.5      29 Jan 2021 09:09                RADIOLOGY & ADDITIONAL TESTS:  Care Discussed with Consultants/Other Providers: Medicine NP and GI     Patient does not want me to talk to his family for updates

## 2021-01-29 NOTE — PROGRESS NOTE ADULT - ASSESSMENT
Impression:  55M with PMHx of ESRD (M/W/F), HTN, & GERD presents to [facility] with two day history of fatigue, dyspnea on exertion and dark stools found to have anemia  #Anemia - likely from upper GI bleed given dark stools.  DDx includes PUD, angiectasia, malignancy, esophagitis vs small bowel angiectasia or lesions in the right side of the colon patient is HD stable currently    Recommendations:   - monitor hemoglobin   - transfuse as needed   - two active IVs at all times   - active type and screen   - colonoscopy +/- capsule endoscopy Monday   - clear liquids to be continued   - Golytely Sunday night   - NPO after midnight sunday    Felicita Sher  Gastroenterology Fellow  Pager x [phone] or [phone]  Please page on-call Fellow on weekends/holidays or after 5 pm and before 8 am on weekdays   On-call GI fellow can be contacted via the answering service ([phone]) Impression:  55M with PMHx of ESRD (M/W/F), HTN, & GERD presents to [facility] with two day history of fatigue, dyspnea on exertion and dark stools found to have anemia  #Anemia - likely from upper GI bleed given dark stools.  DDx includes PUD, angiectasia, malignancy, esophagitis vs small bowel angiectasia or lesions in the right side of the colon patient is HD stable currently    Recommendations:   - monitor hemoglobin   - transfuse as needed   - two active IVs at all times   - active type and screen   - Can consider capsule today as patient did not prep for colonoscopy; but he will still need a colonoscopy as he has never had one before   - clear liquids to be continued   - Golytely Sunday night   - NPO after midnight sunday   - Can stop Protonix drip as no gastric or duodenal ulcers seen yesterday on push enteroscopy    Felicita Sher  Gastroenterology Fellow  Pager x [phone] or [phone]  Please page on-call Fellow on weekends/holidays or after 5 pm and before 8 am on weekdays   On-call GI fellow can be contacted via the [employer] service ([phone])

## 2021-01-29 NOTE — BEHAVIORAL HEALTH ASSESSMENT NOTE - RISK ASSESSMENT
Pt is low acute risk of suicide given no current or past SI/SA. Low Acute Suicide Risk Pt is low acute risk of suicide given no current or past SI/SA, future oriented, risk factors include med issues

## 2021-01-29 NOTE — PROVIDER CONTACT NOTE (OTHER) - ASSESSMENT
VS as per flowsheet. Pt A&O x4, educated on importance of monitoring blood count due to pt coming in with low H&H, pt refused to have blood drawn. Offered to have blood drawn at a later time, pt does not want labs to be drawn. Most recent H&H: 7.5 & 22.5

## 2021-01-29 NOTE — PROVIDER CONTACT NOTE (CRITICAL VALUE NOTIFICATION) - ASSESSMENT
pt's drank approx 2L Golytely for colonoscopy  no BM noted
H/H-6.9/20.9
patient is a/ox4, in HD, VSS.

## 2021-01-29 NOTE — BEHAVIORAL HEALTH ASSESSMENT NOTE - NSBHCHARTREVIEWVS_PSY_A_CORE FT
Vital Signs Last 24 Hrs  T(C): 36.4 (29 Jan 2021 12:55), Max: 37.3 (28 Jan 2021 17:42)  T(F): 97.6 (29 Jan 2021 12:55), Max: 99.1 (28 Jan 2021 17:42)  HR: 76 (29 Jan 2021 12:55) (73 - 93)  BP: 146/81 (29 Jan 2021 12:55) (137/64 - 157/72)  BP(mean): --  RR: 17 (29 Jan 2021 12:55) (16 - 18)  SpO2: 97% (29 Jan 2021 12:55) (97% - 100%)

## 2021-01-29 NOTE — BEHAVIORAL HEALTH ASSESSMENT NOTE - NSBHCHARTREVIEWLAB_PSY_A_CORE FT
7.4    7.84  )-----------( 117      ( 29 Jan 2021 09:09 )             22.9   01-29    136  |  96  |  54<H>  ----------------------------<  98  4.3   |  27  |  11.01<H>    Ca    8.5      29 Jan 2021 09:09

## 2021-01-29 NOTE — PROGRESS NOTE ADULT - SUBJECTIVE AND OBJECTIVE BOX
Patient seen and examined  no complaints    No Known Allergies    Hospital Medications:   MEDICATIONS  (STANDING):  chlorhexidine 4% Liquid 1 Application(s) Topical once  epoetin ye-epbx (RETACRIT) Injectable 90971 Unit(s) IV Push <User Schedule>  gabapentin 300 milliGRAM(s) Oral daily  influenza   Vaccine 0.5 milliLiter(s) IntraMuscular once  nicotine - 21 mG/24Hr(s) Patch 1 patch Transdermal daily  pantoprazole  Injectable 40 milliGRAM(s) IV Push every 12 hours  sevelamer carbonate 800 milliGRAM(s) Oral three times a day with meals  simvastatin 20 milliGRAM(s) Oral at bedtime  sodium chloride 0.9%. 500 milliLiter(s) (30 mL/Hr) IV Continuous <Continuous>        VITALS:  T(F): 97.6 (01-29-21 @ 12:55), Max: 99.1 (01-28-21 @ 17:42)  HR: 76 (01-29-21 @ 12:55)  BP: 146/81 (01-29-21 @ 12:55)  RR: 17 (01-29-21 @ 12:55)  SpO2: 97% (01-29-21 @ 12:55)  Wt(kg): --    01-28 @ 07:01  -  01-29 @ 07:00  --------------------------------------------------------  IN: 180 mL / OUT: 0 mL / NET: 180 mL        PHYSICAL EXAM:  Constitutional: NAD  HEENT: anicteric sclera, oropharynx clear, MMM  Neck: No JVD  Respiratory: CTAB, no wheezes, rales or rhonchi  Cardiovascular: S1, S2, RRR  Gastrointestinal: BS+, soft, NT/ND  Extremities: ++ peripheral edema  Vascular Access: left forearm avf +thrill    LABS:  01-29    136  |  96  |  54<H>  ----------------------------<  98  4.3   |  27  |  11.01<H>    Ca    8.5      29 Jan 2021 09:09      Creatinine Trend: 11.01 <--, 7.79 <--, 9.55 <--                        7.4    7.84  )-----------( 117      ( 29 Jan 2021 09:09 )             22.9     Urine Studies:      RADIOLOGY & ADDITIONAL STUDIES:

## 2021-01-29 NOTE — BEHAVIORAL HEALTH ASSESSMENT NOTE - NSBHREFERDETAILS_PSY_A_CORE_FT
consult for capacity determination following pt refusal of hemodialysis, endoscopy, and blood transfusion

## 2021-01-29 NOTE — PROGRESS NOTE ADULT - ASSESSMENT
55M with PMHx of ESRD (M/W/F), HTN, & GERD presents to [facility] with two day history of fatigue, dyspnea on exertion and melena. Patient receives HD via [facility] MWF at [facility].

## 2021-01-29 NOTE — PROGRESS NOTE ADULT - SUBJECTIVE AND OBJECTIVE BOX
Chief Complaint:  Patient is a 55y old  Male who presents with a chief complaint of Fatigue (28 Jan 2021 13:07)      Interval Events:   Patient was unable to drink prep last  night.  Colonoscopy cancelled. Patient with hemoglobin of 6.9.    Allergies:  No Known Allergies      Hospital Medications:  bisacodyl 5 milliGRAM(s) Oral once PRN  chlorhexidine 4% Liquid 1 Application(s) Topical once  epoetin ye-epbx (RETACRIT) Injectable 48751 Unit(s) IV Push <User Schedule>  gabapentin 300 milliGRAM(s) Oral daily  influenza   Vaccine 0.5 milliLiter(s) IntraMuscular once  nicotine - 21 mG/24Hr(s) Patch 1 patch Transdermal daily  pantoprazole Infusion 8 mG/Hr IV Continuous <Continuous>  sevelamer carbonate 800 milliGRAM(s) Oral three times a day with meals  simvastatin 20 milliGRAM(s) Oral at bedtime  sodium chloride 0.9%. 500 milliLiter(s) IV Continuous <Continuous>      PMHX/PSHX:  ESRD (end stage renal disease) on dialysis    HLD (hyperlipidemia)    HTN (hypertension)    Tobacco use    Vitamin D deficiency    Hyperlipidemia    ESRD on hemodialysis    GERD (gastroesophageal reflux disease)    Hypertension    End Stage Renal Disease    Benign Essential Hypertension    No significant past surgical history    AV fistula    History of open reduction and internal fixation (ORIF) procedure        Family history:  Family history of hypertension in mother    Family history of lymphoma    Family history of chronic renal disease    Family history of lymphoma        ROS:  General: no night sweats, wt loss  CV: no pain, palpitation  Resp: no cough wheezing  Muscles: no weakness or pain  Endocrine: no polyuria, polydipsia, cold/heat intolerance  Heme: No petechia, ecchymosis    PHYSICAL EXAM:   GENERAL:  NAD  HEENT:  NC/AT,  conjunctivae clear, sclera -anicteric  CHEST:  Full & symmetric excursion, no increased  HEART:  Regular rhythm  ABDOMEN:  Soft, non-tender, non-distended, normoactive bowel sounds,  no masses ,no hepato-splenomegaly,   EXTREMITIES:  no cyanosis,clubbing or edema  SKIN:  No rash/erythema/ecchymoses/petechiae/wounds/abscess/warm/dry  NEURO:  Alert, oriented    Vital Signs:  Vital Signs Last 24 Hrs  T(C): 36.6 (29 Jan 2021 06:00), Max: 37.3 (28 Jan 2021 17:42)  T(F): 97.9 (29 Jan 2021 06:00), Max: 99.1 (28 Jan 2021 17:42)  HR: 93 (29 Jan 2021 06:00) (76 - 101)  BP: 154/82 (29 Jan 2021 06:00) (124/83 - 162/91)  BP(mean): --  RR: 16 (29 Jan 2021 06:00) (13 - 20)  SpO2: 100% (29 Jan 2021 06:00) (96% - 100%)  Daily Height in cm: 177.8 (28 Jan 2021 09:47)    Daily     LABS:                        6.9    8.76  )-----------( 121      ( 29 Jan 2021 01:22 )             20.9     01-28    138  |  98  |  44<H>  ----------------------------<  68<L>  4.1   |  26  |  7.79<H>    Ca    8.8      28 Jan 2021 06:55                Imaging:             Chief Complaint:  Patient is a 55y old  Male who presents with a chief complaint of Fatigue (28 Jan 2021 13:07)      Interval Events:   Patient did not drink prep last  night, consumed less than 1/2. Still with solid stool. Colonoscopy cancelled. Patient with hemoglobin of 6.9.    Allergies:  No Known Allergies      Hospital Medications:  bisacodyl 5 milliGRAM(s) Oral once PRN  chlorhexidine 4% Liquid 1 Application(s) Topical once  epoetin ye-epbx (RETACRIT) Injectable 58106 Unit(s) IV Push <User Schedule>  gabapentin 300 milliGRAM(s) Oral daily  influenza   Vaccine 0.5 milliLiter(s) IntraMuscular once  nicotine - 21 mG/24Hr(s) Patch 1 patch Transdermal daily  pantoprazole Infusion 8 mG/Hr IV Continuous <Continuous>  sevelamer carbonate 800 milliGRAM(s) Oral three times a day with meals  simvastatin 20 milliGRAM(s) Oral at bedtime  sodium chloride 0.9%. 500 milliLiter(s) IV Continuous <Continuous>      PMHX/PSHX:  ESRD (end stage renal disease) on dialysis    HLD (hyperlipidemia)    HTN (hypertension)    Tobacco use    Vitamin D deficiency    Hyperlipidemia    ESRD on hemodialysis    GERD (gastroesophageal reflux disease)    Hypertension    End Stage Renal Disease    Benign Essential Hypertension    No significant past surgical history    AV fistula    History of open reduction and internal fixation (ORIF) procedure        Family history:  Family history of hypertension in mother    Family history of lymphoma    Family history of chronic renal disease    Family history of lymphoma        ROS:  General: no night sweats, wt loss  CV: no pain, palpitation  Resp: no cough wheezing  Muscles: no weakness or pain  Endocrine: no polyuria, polydipsia, cold/heat intolerance  Heme: No petechia, ecchymosis    PHYSICAL EXAM:   GENERAL:  NAD  HEENT:  NC/AT,  conjunctivae clear, sclera -anicteric  CHEST:  Full & symmetric excursion, no increased  HEART:  Regular rhythm  ABDOMEN:  Soft, non-tender, non-distended, normoactive bowel sounds,  no masses ,no hepato-splenomegaly,   EXTREMITIES:  no cyanosis,clubbing or edema  SKIN:  No rash/erythema/ecchymoses/petechiae/wounds/abscess/warm/dry  NEURO:  Alert, oriented    Vital Signs:  Vital Signs Last 24 Hrs  T(C): 36.6 (29 Jan 2021 06:00), Max: 37.3 (28 Jan 2021 17:42)  T(F): 97.9 (29 Jan 2021 06:00), Max: 99.1 (28 Jan 2021 17:42)  HR: 93 (29 Jan 2021 06:00) (76 - 101)  BP: 154/82 (29 Jan 2021 06:00) (124/83 - 162/91)  BP(mean): --  RR: 16 (29 Jan 2021 06:00) (13 - 20)  SpO2: 100% (29 Jan 2021 06:00) (96% - 100%)  Daily Height in cm: 177.8 (28 Jan 2021 09:47)    Daily     LABS:                        6.9    8.76  )-----------( 121      ( 29 Jan 2021 01:22 )             20.9     01-28    138  |  98  |  44<H>  ----------------------------<  68<L>  4.1   |  26  |  7.79<H>    Ca    8.8      28 Jan 2021 06:55                Imaging:

## 2021-01-29 NOTE — PROVIDER CONTACT NOTE (CRITICAL VALUE NOTIFICATION) - BACKGROUND
Hx of ESRD (M/W/F), HTN, & GERD presents to [facility] with two day history of fatigue, dyspnea on Patient states one day prior to admission he began to have more fatigue, decrease exercise tolerance, dyspnea on exertion and "dark" stools. Hx of ESRD (M/W/F), HTN, & GERD presents to [facility] with two day history of fatigue, dyspnea on Patient states one day prior to admission he began to have more fatigue, decrease exercise tolerance, dyspnea on exertion and "dark" stools

## 2021-01-29 NOTE — BEHAVIORAL HEALTH ASSESSMENT NOTE - SUMMARY
Pt is a 56 yo male domiciled alone w/ no significant PPHx, PMHx significant for ESRD on HD who presents to [facility] 1/26 with active GI bleed, Hgb 4 now s/p 4 units PRBCs. Psychiatry was consulted for evaluation of capacity following report that pt was refusing HD, transfusions, and endoscopy this morning 1/29.    Pt now agreeable to primary team recommendations with no further concern regarding capacity at this time. Pt is a 54 yo male domiciled alone w/ no significant PPHx, PMHx significant for ESRD on HD who presents to [facility] 1/26 with active GI bleed, Hgb 4 now s/p 4 units PRBCs. Psychiatry was consulted for evaluation of capacity following report that pt was refusing HD, transfusions, and endoscopy this morning 1/29.    Pt now agreeable to primary team recommendations with no further concern regarding capacity at this time. pt denies si/hi, mood symptoms.

## 2021-01-29 NOTE — PROVIDER CONTACT NOTE (CRITICAL VALUE NOTIFICATION) - BACKGROUND
PMHx of ESRD (M/W/F), HTN, & GERD presents to [facility] with two day history of fatigue, dyspnea on exertion and melena. Patient states one day prior to admission he began to have more fatigue, decrease exercise tolerance, dyspnea on exertion and "dark" stools.

## 2021-01-29 NOTE — PROGRESS NOTE ADULT - ASSESSMENT
55M with PMHx of ESRD (M/W/F), HTN, & GERD presents to [facility] with two day history of fatigue, dyspnea on exertion and melena. Patient found to have a normocytic anemia with hemoglobin of 4.8 (baseline 8-9). Patient likely suffering from symptomatic anemia due to possible UGIB.

## 2021-01-30 LAB
ANION GAP SERPL CALC-SCNC: 14 MMOL/L — SIGNIFICANT CHANGE UP (ref 5–17)
BUN SERPL-MCNC: 30 MG/DL — HIGH (ref 7–23)
CALCIUM SERPL-MCNC: 9 MG/DL — SIGNIFICANT CHANGE UP (ref 8.4–10.5)
CHLORIDE SERPL-SCNC: 94 MMOL/L — LOW (ref 96–108)
CO2 SERPL-SCNC: 27 MMOL/L — SIGNIFICANT CHANGE UP (ref 22–31)
CREAT SERPL-MCNC: 8.86 MG/DL — HIGH (ref 0.5–1.3)
GLUCOSE SERPL-MCNC: 82 MG/DL — SIGNIFICANT CHANGE UP (ref 70–99)
HCT VFR BLD CALC: 25.5 % — LOW (ref 39–50)
HCT VFR BLD CALC: 25.9 % — LOW (ref 39–50)
HGB BLD-MCNC: 8.2 G/DL — LOW (ref 13–17)
HGB BLD-MCNC: 8.5 G/DL — LOW (ref 13–17)
MAGNESIUM SERPL-MCNC: 2.1 MG/DL — SIGNIFICANT CHANGE UP (ref 1.6–2.6)
MCHC RBC-ENTMCNC: 29.5 PG — SIGNIFICANT CHANGE UP (ref 27–34)
MCHC RBC-ENTMCNC: 30.6 PG — SIGNIFICANT CHANGE UP (ref 27–34)
MCHC RBC-ENTMCNC: 31.7 GM/DL — LOW (ref 32–36)
MCHC RBC-ENTMCNC: 33.3 GM/DL — SIGNIFICANT CHANGE UP (ref 32–36)
MCV RBC AUTO: 91.7 FL — SIGNIFICANT CHANGE UP (ref 80–100)
MCV RBC AUTO: 93.2 FL — SIGNIFICANT CHANGE UP (ref 80–100)
NRBC # BLD: 0 /100 WBCS — SIGNIFICANT CHANGE UP (ref 0–0)
NRBC # BLD: 0 /100 WBCS — SIGNIFICANT CHANGE UP (ref 0–0)
PHOSPHATE SERPL-MCNC: 5.1 MG/DL — HIGH (ref 2.5–4.5)
PLATELET # BLD AUTO: 125 K/UL — LOW (ref 150–400)
PLATELET # BLD AUTO: 139 K/UL — LOW (ref 150–400)
POTASSIUM SERPL-MCNC: 4 MMOL/L — SIGNIFICANT CHANGE UP (ref 3.5–5.3)
POTASSIUM SERPL-SCNC: 4 MMOL/L — SIGNIFICANT CHANGE UP (ref 3.5–5.3)
RBC # BLD: 2.78 M/UL — LOW (ref 4.2–5.8)
RBC # BLD: 2.78 M/UL — LOW (ref 4.2–5.8)
RBC # FLD: 17 % — HIGH (ref 10.3–14.5)
RBC # FLD: 17.1 % — HIGH (ref 10.3–14.5)
SODIUM SERPL-SCNC: 135 MMOL/L — SIGNIFICANT CHANGE UP (ref 135–145)
WBC # BLD: 7.75 K/UL — SIGNIFICANT CHANGE UP (ref 3.8–10.5)
WBC # BLD: 7.87 K/UL — SIGNIFICANT CHANGE UP (ref 3.8–10.5)
WBC # FLD AUTO: 7.75 K/UL — SIGNIFICANT CHANGE UP (ref 3.8–10.5)
WBC # FLD AUTO: 7.87 K/UL — SIGNIFICANT CHANGE UP (ref 3.8–10.5)

## 2021-01-30 PROCEDURE — 99233 SBSQ HOSP IP/OBS HIGH 50: CPT

## 2021-01-30 RX ORDER — SEVELAMER CARBONATE 2400 MG/1
1600 POWDER, FOR SUSPENSION ORAL THREE TIMES A DAY
Refills: 0 | Status: DISCONTINUED | OUTPATIENT
Start: 2021-01-30 | End: 2021-02-02

## 2021-01-30 RX ORDER — LABETALOL HCL 100 MG
200 TABLET ORAL EVERY 12 HOURS
Refills: 0 | Status: DISCONTINUED | OUTPATIENT
Start: 2021-01-30 | End: 2021-02-02

## 2021-01-30 RX ADMIN — SIMVASTATIN 20 MILLIGRAM(S): 20 TABLET, FILM COATED ORAL at 21:12

## 2021-01-30 RX ADMIN — Medication 200 MILLIGRAM(S): at 17:25

## 2021-01-30 RX ADMIN — PANTOPRAZOLE SODIUM 40 MILLIGRAM(S): 20 TABLET, DELAYED RELEASE ORAL at 17:25

## 2021-01-30 NOTE — PROGRESS NOTE ADULT - ASSESSMENT
55M with PMHx of ESRD (M/W/F), HTN, & GERD presents to [facility] with two day history of fatigue, dyspnea on exertion and melena. Patient receives HD via [facility] MWF at [facility].

## 2021-01-30 NOTE — PROVIDER CONTACT NOTE (MEDICATION) - SITUATION
Patient refusing vitals and medications. Patient agitated and does not want to be bothered. Encouraged patient to take medication and vital signs and continues to refuse.

## 2021-01-30 NOTE — PROGRESS NOTE ADULT - SUBJECTIVE AND OBJECTIVE BOX
Patient is a 55y old  Male who presents with a chief complaint of Fatigue (29 Jan 2021 14:01)      SUBJECTIVE / OVERNIGHT EVENTS: Patient seen and examined at bedside. States that he wants to go home, he refused Am labs this morning, however now agreeable. Denies any CP, SOB, abd pain and n/v he also denies any more black stools     ROS:  All other review of systems negative    Allergies    No Known Allergies    Intolerances        MEDICATIONS  (STANDING):  chlorhexidine 4% Liquid 1 Application(s) Topical once  epoetin ye-epbx (RETACRIT) Injectable 36056 Unit(s) IV Push <User Schedule>  gabapentin 300 milliGRAM(s) Oral daily  influenza   Vaccine 0.5 milliLiter(s) IntraMuscular once  nicotine - 21 mG/24Hr(s) Patch 1 patch Transdermal daily  pantoprazole  Injectable 40 milliGRAM(s) IV Push every 12 hours  sevelamer carbonate 1600 milliGRAM(s) Oral three times a day  simvastatin 20 milliGRAM(s) Oral at bedtime  sodium chloride 0.9%. 500 milliLiter(s) (30 mL/Hr) IV Continuous <Continuous>    MEDICATIONS  (PRN):  bisacodyl 5 milliGRAM(s) Oral once PRN Constipation      Vital Signs Last 24 Hrs  T(C): 36.3 (29 Jan 2021 15:56), Max: 37.2 (29 Jan 2021 12:23)  T(F): 97.4 (29 Jan 2021 15:56), Max: 98.9 (29 Jan 2021 12:23)  HR: 77 (29 Jan 2021 15:56) (73 - 77)  BP: 168/93 (29 Jan 2021 15:56) (143/80 - 168/93)  BP(mean): --  RR: 18 (29 Jan 2021 15:56) (16 - 18)  SpO2: 97% (29 Jan 2021 15:56) (97% - 98%)  CAPILLARY BLOOD GLUCOSE        I&O's Summary    29 Jan 2021 07:01  -  30 Jan 2021 07:00  --------------------------------------------------------  IN: 800 mL / OUT: 2300 mL / NET: -1500 mL        PHYSICAL EXAM:  GENERAL: NAD, well-developed  HEAD:  Atraumatic, Normocephalic  EYES: EOMI, PERRLA, conjunctiva and sclera clear  NECK: Supple, No JVD  CHEST/LUNG: Clear to auscultation bilaterally; No wheeze  HEART: Regular rate and rhythm; No murmurs, rubs, or gallops  ABDOMEN: Soft, Nontender, Nondistended; Bowel sounds present  EXTREMITIES:  +LUE AVF,+ thrill, No edema ,  NEUROLOGY: AAOx3, non-focal  PSYCH: flat affect  SKIN: No rashes or lesions    LABS:                        8.5    7.87  )-----------( 125      ( 30 Jan 2021 11:02 )             25.5     01-30    135  |  94<L>  |  30<H>  ----------------------------<  82  4.0   |  27  |  8.86<H>    Ca    9.0      30 Jan 2021 11:02  Phos  5.1     01-30  Mg     2.1     01-30                RADIOLOGY & ADDITIONAL TESTS:    Care Discussed with Consultants/Other Providers: Medicine NP

## 2021-01-30 NOTE — PROGRESS NOTE ADULT - SUBJECTIVE AND OBJECTIVE BOX
[facility] : Progress Note :: [phone], (office [phone]),   Dr Francois / Dr West / Dr Langley / Dr Avilez / Dr Ellis LUNA / Dr Perales / Dr Herron / Dr Dick robertson  _____________________________________________________________________________________________    No bleeding    No Known Allergies    Hospital Medications:   MEDICATIONS  (STANDING):  chlorhexidine 4% Liquid 1 Application(s) Topical once  epoetin ye-epbx (RETACRIT) Injectable 58720 Unit(s) IV Push <User Schedule>  gabapentin 300 milliGRAM(s) Oral daily  influenza   Vaccine 0.5 milliLiter(s) IntraMuscular once  labetalol 200 milliGRAM(s) Oral every 12 hours  nicotine - 21 mG/24Hr(s) Patch 1 patch Transdermal daily  pantoprazole  Injectable 40 milliGRAM(s) IV Push every 12 hours  sevelamer carbonate 1600 milliGRAM(s) Oral three times a day  simvastatin 20 milliGRAM(s) Oral at bedtime  sodium chloride 0.9%. 500 milliLiter(s) (30 mL/Hr) IV Continuous <Continuous>        VITALS:  T(F): --  HR: --  BP: --  RR: --  SpO2: --  Wt(kg): --    01-29 @ 07:01  -  01-30 @ 07:00  --------------------------------------------------------  IN: 800 mL / OUT: 2300 mL / NET: -1500 mL        PHYSICAL EXAM:  Constitutional: NAD  HEENT: anicteric sclera, oropharynx clear, MMM  Neck: No JVD  Respiratory: CTAB, no wheezes, rales or rhonchi  Cardiovascular: S1, S2, RRR  Gastrointestinal: BS+, soft, NT/ND  Extremities: No cyanosis or clubbing. No peripheral edema  Neurological: A/O x 3, no focal deficits  Psychiatric: Normal mood, normal affect  : No CVA tenderness. No rubio.       LABS:  01-30    135  |  94<L>  |  30<H>  ----------------------------<  82  4.0   |  27  |  8.86<H>    Ca    9.0      30 Jan 2021 11:02  Phos  5.1     01-30  Mg     2.1     01-30      Creatinine Trend: 8.86 <--, 11.01 <--, 7.79 <--, 9.55 <--                        8.5    7.87  )-----------( 125      ( 30 Jan 2021 11:02 )             25.5     Urine Studies:      RADIOLOGY & ADDITIONAL STUDIES:

## 2021-01-30 NOTE — PROVIDER CONTACT NOTE (MEDICATION) - BACKGROUND
54 y/o M with PMH dialysis on M/W/F at [facility] (last session 1/25) , HTN, HLD, GERD presents to ED with fatigue and SOB. Patient states that for the last day he has been very tired. States that he has also been having SOB worsened with ambulation, but also

## 2021-01-30 NOTE — PROGRESS NOTE ADULT - ASSESSMENT
55M with PMHx of ESRD (M/W/F), HTN, & GERD presents to [facility] with two day history of fatigue, dyspnea on exertion and melena. Patient found to have a normocytic anemia with hemoglobin of 4.8 (baseline 8-9). Patient likely suffering from symptomatic anemia due to possible UGIB.

## 2021-01-31 LAB
ANION GAP SERPL CALC-SCNC: 15 MMOL/L — SIGNIFICANT CHANGE UP (ref 5–17)
BUN SERPL-MCNC: 41 MG/DL — HIGH (ref 7–23)
CALCIUM SERPL-MCNC: 8.6 MG/DL — SIGNIFICANT CHANGE UP (ref 8.4–10.5)
CHLORIDE SERPL-SCNC: 95 MMOL/L — LOW (ref 96–108)
CO2 SERPL-SCNC: 26 MMOL/L — SIGNIFICANT CHANGE UP (ref 22–31)
CREAT SERPL-MCNC: 11.25 MG/DL — HIGH (ref 0.5–1.3)
GLUCOSE SERPL-MCNC: 104 MG/DL — HIGH (ref 70–99)
HCT VFR BLD CALC: 24.6 % — LOW (ref 39–50)
HGB BLD-MCNC: 8 G/DL — LOW (ref 13–17)
MAGNESIUM SERPL-MCNC: 2.1 MG/DL — SIGNIFICANT CHANGE UP (ref 1.6–2.6)
MCHC RBC-ENTMCNC: 30.2 PG — SIGNIFICANT CHANGE UP (ref 27–34)
MCHC RBC-ENTMCNC: 32.5 GM/DL — SIGNIFICANT CHANGE UP (ref 32–36)
MCV RBC AUTO: 92.8 FL — SIGNIFICANT CHANGE UP (ref 80–100)
NRBC # BLD: 0 /100 WBCS — SIGNIFICANT CHANGE UP (ref 0–0)
PHOSPHATE SERPL-MCNC: 5.9 MG/DL — HIGH (ref 2.5–4.5)
PLATELET # BLD AUTO: 122 K/UL — LOW (ref 150–400)
POTASSIUM SERPL-MCNC: 3.8 MMOL/L — SIGNIFICANT CHANGE UP (ref 3.5–5.3)
POTASSIUM SERPL-SCNC: 3.8 MMOL/L — SIGNIFICANT CHANGE UP (ref 3.5–5.3)
RBC # BLD: 2.65 M/UL — LOW (ref 4.2–5.8)
RBC # FLD: 17 % — HIGH (ref 10.3–14.5)
SODIUM SERPL-SCNC: 136 MMOL/L — SIGNIFICANT CHANGE UP (ref 135–145)
WBC # BLD: 7.67 K/UL — SIGNIFICANT CHANGE UP (ref 3.8–10.5)
WBC # FLD AUTO: 7.67 K/UL — SIGNIFICANT CHANGE UP (ref 3.8–10.5)

## 2021-01-31 PROCEDURE — 99233 SBSQ HOSP IP/OBS HIGH 50: CPT

## 2021-01-31 PROCEDURE — 91110 GI TRC IMG INTRAL ESOPH-ILE: CPT | Mod: 26,GC

## 2021-01-31 RX ORDER — SOD SULF/SODIUM/NAHCO3/KCL/PEG
4000 SOLUTION, RECONSTITUTED, ORAL ORAL ONCE
Refills: 0 | Status: COMPLETED | OUTPATIENT
Start: 2021-01-31 | End: 2021-01-31

## 2021-01-31 RX ADMIN — Medication 200 MILLIGRAM(S): at 17:47

## 2021-01-31 RX ADMIN — PANTOPRAZOLE SODIUM 40 MILLIGRAM(S): 20 TABLET, DELAYED RELEASE ORAL at 04:56

## 2021-01-31 RX ADMIN — PANTOPRAZOLE SODIUM 40 MILLIGRAM(S): 20 TABLET, DELAYED RELEASE ORAL at 17:47

## 2021-01-31 RX ADMIN — Medication 4000 MILLILITER(S): at 17:48

## 2021-01-31 RX ADMIN — SIMVASTATIN 20 MILLIGRAM(S): 20 TABLET, FILM COATED ORAL at 21:12

## 2021-01-31 RX ADMIN — Medication 1 PATCH: at 13:19

## 2021-01-31 RX ADMIN — GABAPENTIN 300 MILLIGRAM(S): 400 CAPSULE ORAL at 13:20

## 2021-01-31 RX ADMIN — Medication 1 PATCH: at 19:30

## 2021-01-31 RX ADMIN — Medication 200 MILLIGRAM(S): at 04:56

## 2021-01-31 NOTE — PROGRESS NOTE ADULT - ASSESSMENT
55M with PMHx of ESRD (M/W/F), HTN, & GERD presents to [facility] with two day history of fatigue, dyspnea on exertion and melena. Patient found to have a normocytic anemia with hemoglobin of 4.8 (baseline 8-9). Patient likely suffering from symptomatic anemia due to possible UGIB.

## 2021-01-31 NOTE — CHART NOTE - NSCHARTNOTEFT_GEN_A_CORE
DIANE HAQ    Notified by RN patient with critical lab result                         6.9    8.76  )-----------( 121      ( 29 Jan 2021 01:22 )             20.9     Vital Signs Last 24 Hrs  T(C): 36.6 (29 Jan 2021 03:20), Max: 37.3 (28 Jan 2021 17:42)  T(F): 97.9 (29 Jan 2021 03:20), Max: 99.1 (28 Jan 2021 17:42)  HR: 82 (29 Jan 2021 03:20) (76 - 101)  BP: 157/72 (29 Jan 2021 03:20) (124/83 - 162/91)  BP(mean): --  RR: 16 (29 Jan 2021 03:20) (13 - 20)  SpO2: 98% (29 Jan 2021 03:20) (96% - 100%)    Interventions taken:   -Will give 1 unit PRBC's to maintain Hgb > 7  -Rpt CBC in AM post transfusion  -RN informed of plan
Risks of video capsule endoscopy explained, including risk of retained capsule, potentially requiring surgery for removal.  Patient understands and agrees to risks.      Capsule swallowed at: 1100    NPO now.   Resume Clear liquid diet at:  1300   Resume regular consistency diet at: 1500    Equipment can be removed at: 2300    GI fellow will retrieve equipment in the morning.    NO MRI UNTIL CAPSULE CLEARANCE CONFIRMED. CAPSULE IS NOT MRI COMPATIBLE.
Patient planned for colonoscopy tomorrow.     Instructions for colonoscopy  - clear liquid diet today, NPO at midnight  - please ensure golytely is completed (to be ordered by GI fellow)  - please ensure patient drinks entire prep  - please ensure morning labs are drawn at 2am, electrolytes repleted as necessary, and Hg>7  - rest of care per primary team

## 2021-01-31 NOTE — PROGRESS NOTE ADULT - SUBJECTIVE AND OBJECTIVE BOX
Patient is a 55y old  Male who presents with a chief complaint of Fatigue (30 Jan 2021 16:37)      SUBJECTIVE / OVERNIGHT EVENTS: Patient seen and examined at bedside. States he does not understand why we haven't found a cause of his bleeding, he is frustrated, agreeable to colonoscopy tomorrow. Denies any CP, SOB, abd pain and n/v    ROS:  All other review of systems negative    Allergies    No Known Allergies    Intolerances        MEDICATIONS  (STANDING):  chlorhexidine 4% Liquid 1 Application(s) Topical once  epoetin ye-epbx (RETACRIT) Injectable 76049 Unit(s) IV Push <User Schedule>  gabapentin 300 milliGRAM(s) Oral daily  influenza   Vaccine 0.5 milliLiter(s) IntraMuscular once  labetalol 200 milliGRAM(s) Oral every 12 hours  nicotine - 21 mG/24Hr(s) Patch 1 patch Transdermal daily  pantoprazole  Injectable 40 milliGRAM(s) IV Push every 12 hours  polyethylene glycol/electrolyte Solution. 4000 milliLiter(s) Oral once  sevelamer carbonate 1600 milliGRAM(s) Oral three times a day  simvastatin 20 milliGRAM(s) Oral at bedtime  sodium chloride 0.9%. 500 milliLiter(s) (30 mL/Hr) IV Continuous <Continuous>    MEDICATIONS  (PRN):  bisacodyl 5 milliGRAM(s) Oral once PRN Constipation      Vital Signs Last 24 Hrs  T(C): 36.7 (31 Jan 2021 04:50), Max: 37.4 (30 Jan 2021 20:53)  T(F): 98 (31 Jan 2021 04:50), Max: 99.3 (30 Jan 2021 20:53)  HR: 80 (31 Jan 2021 04:50) (77 - 83)  BP: 151/82 (31 Jan 2021 04:50) (151/82 - 156/78)  BP(mean): --  RR: 18 (31 Jan 2021 04:50) (18 - 18)  SpO2: 99% (31 Jan 2021 04:50) (98% - 99%)  CAPILLARY BLOOD GLUCOSE        I&O's Summary    30 Jan 2021 07:01  -  31 Jan 2021 07:00  --------------------------------------------------------  IN: 240 mL / OUT: 0 mL / NET: 240 mL        PHYSICAL EXAM:  GENERAL: NAD, well-developed  HEAD:  Atraumatic, Normocephalic  EYES: EOMI, PERRLA, conjunctiva and sclera clear  NECK: Supple, No JVD  CHEST/LUNG: Clear to auscultation bilaterally; No wheeze  HEART: Regular rate and rhythm; No murmurs, rubs, or gallops  ABDOMEN: Soft, Nontender, Nondistended; Bowel sounds present  EXTREMITIES:  +LUE AVF,+ thrill, No edema ,  NEUROLOGY: AAOx3, non-focal  PSYCH: flat affect  SKIN: No rashes or lesions    LABS:                        8.0    7.67  )-----------( 122      ( 31 Jan 2021 06:08 )             24.6     01-31    136  |  95<L>  |  41<H>  ----------------------------<  104<H>  3.8   |  26  |  11.25<H>    Ca    8.6      31 Jan 2021 06:07  Phos  5.9     01-31  Mg     2.1     01-31                RADIOLOGY & ADDITIONAL TESTS:    Consultant(s) Notes Reviewed:  Nephrology GI rec noted     Care Discussed with Consultants/Other Providers: Medicine NP

## 2021-02-01 ENCOUNTER — RESULT REVIEW (OUTPATIENT)
Age: 56
End: 2021-02-01

## 2021-02-01 LAB
ANION GAP SERPL CALC-SCNC: 18 MMOL/L — HIGH (ref 5–17)
BUN SERPL-MCNC: 44 MG/DL — HIGH (ref 7–23)
CALCIUM SERPL-MCNC: 8.7 MG/DL — SIGNIFICANT CHANGE UP (ref 8.4–10.5)
CHLORIDE SERPL-SCNC: 93 MMOL/L — LOW (ref 96–108)
CO2 SERPL-SCNC: 23 MMOL/L — SIGNIFICANT CHANGE UP (ref 22–31)
CREAT SERPL-MCNC: 12.83 MG/DL — HIGH (ref 0.5–1.3)
CULTURE RESULTS: SIGNIFICANT CHANGE UP
CULTURE RESULTS: SIGNIFICANT CHANGE UP
GLUCOSE SERPL-MCNC: 91 MG/DL — SIGNIFICANT CHANGE UP (ref 70–99)
HCT VFR BLD CALC: 24 % — LOW (ref 39–50)
HGB BLD-MCNC: 7.8 G/DL — LOW (ref 13–17)
MCHC RBC-ENTMCNC: 30 PG — SIGNIFICANT CHANGE UP (ref 27–34)
MCHC RBC-ENTMCNC: 32.5 GM/DL — SIGNIFICANT CHANGE UP (ref 32–36)
MCV RBC AUTO: 92.3 FL — SIGNIFICANT CHANGE UP (ref 80–100)
NRBC # BLD: 0 /100 WBCS — SIGNIFICANT CHANGE UP (ref 0–0)
PLATELET # BLD AUTO: 124 K/UL — LOW (ref 150–400)
POTASSIUM SERPL-MCNC: 3.7 MMOL/L — SIGNIFICANT CHANGE UP (ref 3.5–5.3)
POTASSIUM SERPL-SCNC: 3.7 MMOL/L — SIGNIFICANT CHANGE UP (ref 3.5–5.3)
RBC # BLD: 2.6 M/UL — LOW (ref 4.2–5.8)
RBC # FLD: 17.1 % — HIGH (ref 10.3–14.5)
SODIUM SERPL-SCNC: 134 MMOL/L — LOW (ref 135–145)
SPECIMEN SOURCE: SIGNIFICANT CHANGE UP
SPECIMEN SOURCE: SIGNIFICANT CHANGE UP
WBC # BLD: 7.78 K/UL — SIGNIFICANT CHANGE UP (ref 3.8–10.5)
WBC # FLD AUTO: 7.78 K/UL — SIGNIFICANT CHANGE UP (ref 3.8–10.5)

## 2021-02-01 PROCEDURE — 45385 COLONOSCOPY W/LESION REMOVAL: CPT | Mod: GC

## 2021-02-01 PROCEDURE — 88305 TISSUE EXAM BY PATHOLOGIST: CPT | Mod: 26

## 2021-02-01 PROCEDURE — 99232 SBSQ HOSP IP/OBS MODERATE 35: CPT

## 2021-02-01 RX ORDER — LANOLIN ALCOHOL/MO/W.PET/CERES
5 CREAM (GRAM) TOPICAL AT BEDTIME
Refills: 0 | Status: DISCONTINUED | OUTPATIENT
Start: 2021-02-01 | End: 2021-02-02

## 2021-02-01 RX ORDER — PANTOPRAZOLE SODIUM 20 MG/1
40 TABLET, DELAYED RELEASE ORAL
Refills: 0 | Status: DISCONTINUED | OUTPATIENT
Start: 2021-02-01 | End: 2021-02-02

## 2021-02-01 RX ORDER — HYDRALAZINE HCL 50 MG
50 TABLET ORAL THREE TIMES A DAY
Refills: 0 | Status: DISCONTINUED | OUTPATIENT
Start: 2021-02-01 | End: 2021-02-02

## 2021-02-01 RX ADMIN — Medication 1 PATCH: at 14:52

## 2021-02-01 RX ADMIN — Medication 50 MILLIGRAM(S): at 22:46

## 2021-02-01 RX ADMIN — PANTOPRAZOLE SODIUM 40 MILLIGRAM(S): 20 TABLET, DELAYED RELEASE ORAL at 05:12

## 2021-02-01 RX ADMIN — GABAPENTIN 300 MILLIGRAM(S): 400 CAPSULE ORAL at 14:50

## 2021-02-01 RX ADMIN — Medication 200 MILLIGRAM(S): at 22:42

## 2021-02-01 RX ADMIN — Medication 200 MILLIGRAM(S): at 05:12

## 2021-02-01 RX ADMIN — Medication 5 MILLIGRAM(S): at 22:46

## 2021-02-01 RX ADMIN — SIMVASTATIN 20 MILLIGRAM(S): 20 TABLET, FILM COATED ORAL at 22:43

## 2021-02-01 RX ADMIN — Medication 1 PATCH: at 22:41

## 2021-02-01 RX ADMIN — Medication 1 PATCH: at 14:51

## 2021-02-01 RX ADMIN — ERYTHROPOIETIN 20000 UNIT(S): 10000 INJECTION, SOLUTION INTRAVENOUS; SUBCUTANEOUS at 20:34

## 2021-02-01 RX ADMIN — SODIUM CHLORIDE 30 MILLILITER(S): 9 INJECTION INTRAMUSCULAR; INTRAVENOUS; SUBCUTANEOUS at 10:28

## 2021-02-01 NOTE — PROGRESS NOTE ADULT - ASSESSMENT
55M with PMHx of ESRD (M/W/F), HTN, & GERD presents to [facility] with two day history of fatigue, dyspnea on exertion and melena. Patient found to have a normocytic anemia with hemoglobin of 4.8 (baseline 8-9). Patient likely suffering from symptomatic anemia due to possible GIB

## 2021-02-01 NOTE — PRE PROCEDURE NOTE - PRE PROCEDURE EVALUATION
Attending Physician:  Dr. Aragon                          Procedure:  Colonoscopy    Indication for Procedure:  GI Bleed  ________________________________________________________  PAST MEDICAL & SURGICAL HISTORY:  HLD (hyperlipidemia)  HTN (hypertension)  Tobacco use  Vitamin D deficiency  Hyperlipidemia  ESRD on hemodialysis  GERD (gastroesophageal reflux disease)  Hypertension  Benign Essential Hypertension  No significant past surgical history  AV fistula  Left upper arm 2/2006  History of open reduction and internal fixation (ORIF) procedure  Left Leg  1990  Hardware subsequently removed. Year unknown      ALLERGIES:  No Known Allergies    HOME MEDICATIONS:  famotidine 20 mg oral tablet: 1 tab(s) orally once a day  gabapentin 300 mg oral tablet: 1 tab(s) orally once a day  hydrALAZINE 100 mg oral tablet: 1 tab(s) orally 3 times a day  labetalol 200 mg oral tablet: 1 tab(s) orally 2 times a day  lisinopril 20 mg oral tablet: 1 tab(s) orally once a day  Renagel 800 mg oral tablet: 2 tab(s) orally 3 times a day  simvastatin 20 mg oral tablet: 1 tab(s) orally once a day (at bedtime)    AICD/PPM: [ ] yes   [X ] no    PERTINENT LAB DATA:                        7.8    7.78  )-----------( 124      ( 01 Feb 2021 01:48 )             24.0     02-01    134<L>  |  93<L>  |  44<H>  ----------------------------<  91  3.7   |  23  |  12.83<H>    Ca    8.7      01 Feb 2021 01:48  Phos  5.9     01-31  Mg     2.1     01-31      PHYSICAL EXAMINATION:    T(C): 37  HR: 73  BP: 137/76  RR: 18  SpO2: 98%    Constitutional: NAD    Neck:  No JVD  Respiratory: CTAB/L  Cardiovascular: S1 and S2  Gastrointestinal: BS+, soft, NT/ND  Extremities: No peripheral edema  Neurological: A/O x 3, no focal deficits    COMMENTS:    The patient is a suitable candidate for the planned procedure.
Attending Physician:     Dr. Borja                       Procedure:  Push Enteroscopy    Indication for Procedure:  Suspected UGIB  ________________________________________________________  PAST MEDICAL & SURGICAL HISTORY:  HLD (hyperlipidemia)  HTN (hypertension)  Tobacco use  Vitamin D deficiency  Hyperlipidemia  ESRD on hemodialysis  GERD (gastroesophageal reflux disease)  Hypertension  Benign Essential Hypertension    Surgical history:  AV fistula Left upper arm 2006  Open reduction and internal fixation (ORIF) procedure Left [place]  Hardware subsequently removed. Year unknown      ALLERGIES:  No Known Allergies    HOME MEDICATIONS:  famotidine 20 mg oral tablet: 1 tab(s) orally once a day  gabapentin 300 mg oral tablet: 1 tab(s) orally once a day  hydrALAZINE 100 mg oral tablet: 1 tab(s) orally 3 times a day  labetalol 200 mg oral tablet: 1 tab(s) orally 2 times a day  lisinopril 20 mg oral tablet: 1 tab(s) orally once a day  Renagel 800 mg oral tablet: 2 tab(s) orally 3 times a day  simvastatin 20 mg oral tablet: 1 tab(s) orally once a day (at bedtime)    AICD/PPM: [ ] yes   [X ] no      PERTINENT LAB DATA:                        7.2    9.16  )-----------( 135      ( 2021 06:55 )             22.1         138  |  98  |  44<H>  ----------------------------<  68<L>  4.1   |  26  |  7.79<H>    Ca    8.8      2021 06:55    TPro  5.5<L>  /  Alb  2.7<L>  /  TBili  0.2  /  DBili  x   /  AST  12  /  ALT  8<L>  /  AlkPhos  72  -    PT/INR - ( 2021 01:57 )   PT: 12.6 sec;   INR: 1.05 ratio    PTT - ( 2021 01:57 )  PTT:29.4 sec      PHYSICAL EXAMINATION:  T(C): 37.2  HR: 86  BP: 112/67  RR: 16  SpO2: 98%    Constitutional: NAD    Neck:  No JVD  Respiratory: CTAB/L  Cardiovascular: S1 and S2  Gastrointestinal: BS+, soft, NT/ND  Extremities: No peripheral edema  Neurological: A/O x 3, no focal deficits    COMMENTS:  The patient is a suitable candidate for the planned procedure.

## 2021-02-01 NOTE — PROGRESS NOTE ADULT - ASSESSMENT
55M with PMHx of ESRD (M/W/F), HTN, & GERD presents to [facility] with two day history of fatigue, dyspnea on exertion and melena. Patient receives HD via [facility] MWF at [facility].

## 2021-02-01 NOTE — PROGRESS NOTE ADULT - SUBJECTIVE AND OBJECTIVE BOX
Patient seen and examined  no complaints  s/p endoscopy    No Known Allergies    Hospital Medications:   MEDICATIONS  (STANDING):  chlorhexidine 4% Liquid 1 Application(s) Topical once  epoetin ye-epbx (RETACRIT) Injectable 47593 Unit(s) IV Push <User Schedule>  gabapentin 300 milliGRAM(s) Oral daily  influenza   Vaccine 0.5 milliLiter(s) IntraMuscular once  labetalol 200 milliGRAM(s) Oral every 12 hours  nicotine - 21 mG/24Hr(s) Patch 1 patch Transdermal daily  pantoprazole  Injectable 40 milliGRAM(s) IV Push every 12 hours  sevelamer carbonate 1600 milliGRAM(s) Oral three times a day  simvastatin 20 milliGRAM(s) Oral at bedtime  sodium chloride 0.9%. 500 milliLiter(s) (30 mL/Hr) IV Continuous <Continuous>        VITALS:  T(F): 97.3 (02-01-21 @ 10:15), Max: 98.7 (01-31-21 @ 21:37)  HR: 68 (02-01-21 @ 12:30)  BP: 137/77 (02-01-21 @ 12:30)  RR: 15 (02-01-21 @ 12:30)  SpO2: 99% (02-01-21 @ 12:30)  Wt(kg): --    01-31 @ 07:01  -  02-01 @ 07:00  --------------------------------------------------------  IN: 120 mL / OUT: 0 mL / NET: 120 mL      Height (cm): 177.8 (02-01 @ 10:41)  Weight (kg): 87 (02-01 @ 10:41)  BMI (kg/m2): 27.5 (02-01 @ 10:41)  BSA (m2): 2.05 (02-01 @ 10:41)  PHYSICAL EXAM:  Constitutional: NAD  HEENT: anicteric sclera, oropharynx clear, MMM  Neck: No JVD  Respiratory: CTAB, no wheezes, rales or rhonchi  Cardiovascular: S1, S2, RRR  Gastrointestinal: BS+, soft, NT/ND  Extremities: No cyanosis or clubbing. No peripheral edema  Neurological: A/O x 3, no focal deficits  Psychiatric: Normal mood, normal affect  : No CVA tenderness. No rubio.   Vascular- left arm AVF + thrill    LABS:  02-01    134<L>  |  93<L>  |  44<H>  ----------------------------<  91  3.7   |  23  |  12.83<H>    Ca    8.7      01 Feb 2021 01:48  Phos  5.9     01-31  Mg     2.1     01-31      Creatinine Trend: 12.83 <--, 11.25 <--, 8.86 <--, 11.01 <--, 7.79 <--, 9.55 <--                        7.8    7.78  )-----------( 124      ( 01 Feb 2021 01:48 )             24.0     Urine Studies:      RADIOLOGY & ADDITIONAL STUDIES:

## 2021-02-01 NOTE — PRE-ANESTHESIA EVALUATION ADULT - NSANTHOSAYNRD_GEN_A_CORE
No. JOAN screening performed.  STOP BANG Legend: 0-2 = LOW Risk; 3-4 = INTERMEDIATE Risk; 5-8 = HIGH Risk
No. JOAN screening performed.  STOP BANG Legend: 0-2 = LOW Risk; 3-4 = INTERMEDIATE Risk; 5-8 = HIGH Risk

## 2021-02-01 NOTE — PROGRESS NOTE ADULT - SUBJECTIVE AND OBJECTIVE BOX
Patient is a 55y old  Male who presents with a chief complaint of Fatigue (01 Feb 2021 14:13)      SUBJECTIVE / OVERNIGHT EVENTS: No ON events. Feels well. seen post colonoscopy. Denies cp sob, dizziness, abdominal pain n/v. Wants to go home today, says his family will  him after HD          ADDITIONAL REVIEW OF SYSTEMS: Negative except for above    MEDICATIONS  (STANDING):  chlorhexidine 4% Liquid 1 Application(s) Topical once  epoetin ye-epbx (RETACRIT) Injectable 68182 Unit(s) IV Push <User Schedule>gabapentin 300 milliGRAM(s) Oral daily  influenza   Vaccine 0.5 milliLiter(s) IntraMuscular once  labetalol 200 milliGRAM(s) Oral every 12 hours  nicotine - 21 mG/24Hr(s) Patch 1 patch Transdermal daily  pantoprazole  Injectable 40 milliGRAM(s) IV Push every 12 hours  sevelamer carbonate 1600 milliGRAM(s) Oral three times a day  simvastatin 20 milliGRAM(s) Oral at bedtime    MEDICATIONS  (PRN):  bisacodyl 5 milliGRAM(s) Oral once PRN Constipation      CAPILLARY BLOOD GLUCOSE        I&O's Summary    31 Jan 2021 07:01  -  01 Feb 2021 07:00  --------------------------------------------------------  IN: 120 mL / OUT: 0 mL / NET: 120 mL        PHYSICAL EXAM:  Vital Signs Last 24 Hrs  T(C): 36.3 (01 Feb 2021 10:41), Max: 37.1 (31 Jan 2021 21:37)  T(F): 97.3 (01 Feb 2021 10:15), Max: 98.7 (31 Jan 2021 21:37)  HR: 68 (01 Feb 2021 12:30) (68 - 87)  BP: 137/77 (01 Feb 2021 12:30) (106/52 - 154/83)  BP(mean): --  RR: 15 (01 Feb 2021 12:30) (14 - 18)  SpO2: 99% (01 Feb 2021 12:30) (98% - 100%)    PHYSICAL EXAM:  GENERAL: NAD, well-developed  HEAD:  Atraumatic, Normocephalic  NECK: Supple, No JVD  CHEST/LUNG: Clear to auscultation bilaterally; No wheeze  HEART: Regular rate and rhythm; No murmurs, rubs, or gallops  ABDOMEN: Soft, Nontender, Nondistended;   EXTREMITIES:  2+ Peripheral Pulses, No clubbing, cyanosis, or edema  PSYCH: AAOx3  NEUROLOGY: non-focal        LABS:                        7.8    7.78  )-----------( 124      ( 01 Feb 2021 01:48 )             24.0     02-01    134<L>  |  93<L>  |  44<H>  ----------------------------<  91  3.7   |  23  |  12.83<H>    Ca    8.7      01 Feb 2021 01:48  Phos  5.9     01-31  Mg     2.1     01-31                  RADIOLOGY & ADDITIONAL TESTS:    Imaging Personally Reviewed:    Electrocardiogram Personally Reviewed:    COORDINATION OF CARE:  Care Discussed with Consultants/Other Providers [Y/N]:  Prior or Outpatient Records Reviewed [Y/N]:

## 2021-02-02 ENCOUNTER — TRANSCRIPTION ENCOUNTER (OUTPATIENT)
Age: 56
End: 2021-02-02

## 2021-02-02 VITALS
SYSTOLIC BLOOD PRESSURE: 150 MMHG | DIASTOLIC BLOOD PRESSURE: 80 MMHG | RESPIRATION RATE: 18 BRPM | TEMPERATURE: 98 F | HEART RATE: 75 BPM | OXYGEN SATURATION: 98 %

## 2021-02-02 LAB
ANION GAP SERPL CALC-SCNC: 11 MMOL/L — SIGNIFICANT CHANGE UP (ref 5–17)
BUN SERPL-MCNC: 23 MG/DL — SIGNIFICANT CHANGE UP (ref 7–23)
CALCIUM SERPL-MCNC: 8.6 MG/DL — SIGNIFICANT CHANGE UP (ref 8.4–10.5)
CHLORIDE SERPL-SCNC: 95 MMOL/L — LOW (ref 96–108)
CO2 SERPL-SCNC: 27 MMOL/L — SIGNIFICANT CHANGE UP (ref 22–31)
CREAT SERPL-MCNC: 8.7 MG/DL — HIGH (ref 0.5–1.3)
GLUCOSE SERPL-MCNC: 85 MG/DL — SIGNIFICANT CHANGE UP (ref 70–99)
HCT VFR BLD CALC: 23.8 % — LOW (ref 39–50)
HGB BLD-MCNC: 7.7 G/DL — LOW (ref 13–17)
MAGNESIUM SERPL-MCNC: 1.9 MG/DL — SIGNIFICANT CHANGE UP (ref 1.6–2.6)
MCHC RBC-ENTMCNC: 30 PG — SIGNIFICANT CHANGE UP (ref 27–34)
MCHC RBC-ENTMCNC: 32.4 GM/DL — SIGNIFICANT CHANGE UP (ref 32–36)
MCV RBC AUTO: 92.6 FL — SIGNIFICANT CHANGE UP (ref 80–100)
NRBC # BLD: 0 /100 WBCS — SIGNIFICANT CHANGE UP (ref 0–0)
PHOSPHATE SERPL-MCNC: 4.1 MG/DL — SIGNIFICANT CHANGE UP (ref 2.5–4.5)
PLATELET # BLD AUTO: 150 K/UL — SIGNIFICANT CHANGE UP (ref 150–400)
POTASSIUM SERPL-MCNC: 3.7 MMOL/L — SIGNIFICANT CHANGE UP (ref 3.5–5.3)
POTASSIUM SERPL-SCNC: 3.7 MMOL/L — SIGNIFICANT CHANGE UP (ref 3.5–5.3)
RBC # BLD: 2.57 M/UL — LOW (ref 4.2–5.8)
RBC # FLD: 16.9 % — HIGH (ref 10.3–14.5)
SODIUM SERPL-SCNC: 133 MMOL/L — LOW (ref 135–145)
WBC # BLD: 7.72 K/UL — SIGNIFICANT CHANGE UP (ref 3.8–10.5)
WBC # FLD AUTO: 7.72 K/UL — SIGNIFICANT CHANGE UP (ref 3.8–10.5)

## 2021-02-02 PROCEDURE — 87635 SARS-COV-2 COVID-19 AMP PRB: CPT

## 2021-02-02 PROCEDURE — 88305 TISSUE EXAM BY PATHOLOGIST: CPT

## 2021-02-02 PROCEDURE — 86900 BLOOD TYPING SEROLOGIC ABO: CPT

## 2021-02-02 PROCEDURE — 86923 COMPATIBILITY TEST ELECTRIC: CPT

## 2021-02-02 PROCEDURE — 84100 ASSAY OF PHOSPHORUS: CPT

## 2021-02-02 PROCEDURE — 99261: CPT

## 2021-02-02 PROCEDURE — 88312 SPECIAL STAINS GROUP 1: CPT

## 2021-02-02 PROCEDURE — 86769 SARS-COV-2 COVID-19 ANTIBODY: CPT

## 2021-02-02 PROCEDURE — 83880 ASSAY OF NATRIURETIC PEPTIDE: CPT

## 2021-02-02 PROCEDURE — 84484 ASSAY OF TROPONIN QUANT: CPT

## 2021-02-02 PROCEDURE — 85610 PROTHROMBIN TIME: CPT

## 2021-02-02 PROCEDURE — 99285 EMERGENCY DEPT VISIT HI MDM: CPT | Mod: 25

## 2021-02-02 PROCEDURE — 85018 HEMOGLOBIN: CPT

## 2021-02-02 PROCEDURE — 86901 BLOOD TYPING SEROLOGIC RH(D): CPT

## 2021-02-02 PROCEDURE — U0005: CPT

## 2021-02-02 PROCEDURE — 82330 ASSAY OF CALCIUM: CPT

## 2021-02-02 PROCEDURE — 80053 COMPREHEN METABOLIC PANEL: CPT

## 2021-02-02 PROCEDURE — 93005 ELECTROCARDIOGRAM TRACING: CPT

## 2021-02-02 PROCEDURE — 85730 THROMBOPLASTIN TIME PARTIAL: CPT

## 2021-02-02 PROCEDURE — 96374 THER/PROPH/DIAG INJ IV PUSH: CPT

## 2021-02-02 PROCEDURE — 83735 ASSAY OF MAGNESIUM: CPT

## 2021-02-02 PROCEDURE — 88313 SPECIAL STAINS GROUP 2: CPT

## 2021-02-02 PROCEDURE — 99239 HOSP IP/OBS DSCHRG MGMT >30: CPT

## 2021-02-02 PROCEDURE — 85014 HEMATOCRIT: CPT

## 2021-02-02 PROCEDURE — 84132 ASSAY OF SERUM POTASSIUM: CPT

## 2021-02-02 PROCEDURE — C1889: CPT

## 2021-02-02 PROCEDURE — 86850 RBC ANTIBODY SCREEN: CPT

## 2021-02-02 PROCEDURE — 84295 ASSAY OF SERUM SODIUM: CPT

## 2021-02-02 PROCEDURE — 85027 COMPLETE CBC AUTOMATED: CPT

## 2021-02-02 PROCEDURE — 82435 ASSAY OF BLOOD CHLORIDE: CPT

## 2021-02-02 PROCEDURE — 82272 OCCULT BLD FECES 1-3 TESTS: CPT

## 2021-02-02 PROCEDURE — 71045 X-RAY EXAM CHEST 1 VIEW: CPT

## 2021-02-02 PROCEDURE — 85025 COMPLETE CBC W/AUTO DIFF WBC: CPT

## 2021-02-02 PROCEDURE — 36430 TRANSFUSION BLD/BLD COMPNT: CPT

## 2021-02-02 PROCEDURE — 82803 BLOOD GASES ANY COMBINATION: CPT

## 2021-02-02 PROCEDURE — 99232 SBSQ HOSP IP/OBS MODERATE 35: CPT | Mod: GC

## 2021-02-02 PROCEDURE — 82947 ASSAY GLUCOSE BLOOD QUANT: CPT

## 2021-02-02 PROCEDURE — 80048 BASIC METABOLIC PNL TOTAL CA: CPT

## 2021-02-02 PROCEDURE — P9016: CPT

## 2021-02-02 PROCEDURE — 83605 ASSAY OF LACTIC ACID: CPT

## 2021-02-02 PROCEDURE — 87040 BLOOD CULTURE FOR BACTERIA: CPT

## 2021-02-02 RX ORDER — NICOTINE POLACRILEX 2 MG
21 GUM BUCCAL
Qty: 315 | Refills: 0
Start: 2021-02-02 | End: 2021-02-16

## 2021-02-02 RX ORDER — PANTOPRAZOLE SODIUM 20 MG/1
1 TABLET, DELAYED RELEASE ORAL
Qty: 30 | Refills: 0
Start: 2021-02-02 | End: 2021-03-03

## 2021-02-02 RX ORDER — HYDRALAZINE HCL 50 MG
100 TABLET ORAL THREE TIMES A DAY
Refills: 0 | Status: DISCONTINUED | OUTPATIENT
Start: 2021-02-02 | End: 2021-02-02

## 2021-02-02 RX ORDER — LISINOPRIL 2.5 MG/1
1 TABLET ORAL
Qty: 0 | Refills: 0 | DISCHARGE

## 2021-02-02 RX ORDER — ERYTHROPOIETIN 10000 [IU]/ML
20000 INJECTION, SOLUTION INTRAVENOUS; SUBCUTANEOUS
Qty: 0 | Refills: 0 | DISCHARGE
Start: 2021-02-02

## 2021-02-02 RX ORDER — HYDRALAZINE HCL 50 MG
1 TABLET ORAL
Qty: 90 | Refills: 0
Start: 2021-02-02 | End: 2021-03-03

## 2021-02-02 RX ORDER — HYDRALAZINE HCL 50 MG
1 TABLET ORAL
Qty: 0 | Refills: 0 | DISCHARGE
Start: 2021-02-02

## 2021-02-02 RX ORDER — HYDRALAZINE HCL 50 MG
1 TABLET ORAL
Qty: 0 | Refills: 0 | DISCHARGE

## 2021-02-02 RX ORDER — PANTOPRAZOLE SODIUM 20 MG/1
1 TABLET, DELAYED RELEASE ORAL
Qty: 0 | Refills: 0 | DISCHARGE
Start: 2021-02-02

## 2021-02-02 RX ORDER — LANOLIN ALCOHOL/MO/W.PET/CERES
1 CREAM (GRAM) TOPICAL
Qty: 10 | Refills: 0
Start: 2021-02-02 | End: 2021-02-11

## 2021-02-02 RX ADMIN — Medication 200 MILLIGRAM(S): at 05:28

## 2021-02-02 RX ADMIN — Medication 1 PATCH: at 09:57

## 2021-02-02 RX ADMIN — Medication 50 MILLIGRAM(S): at 05:28

## 2021-02-02 RX ADMIN — PANTOPRAZOLE SODIUM 40 MILLIGRAM(S): 20 TABLET, DELAYED RELEASE ORAL at 05:28

## 2021-02-02 RX ADMIN — Medication 1 PATCH: at 12:33

## 2021-02-02 NOTE — PROGRESS NOTE ADULT - PROBLEM SELECTOR PROBLEM 4
HTN (hypertension)
Tobacco use
HTN (hypertension)
Tobacco use
HTN (hypertension)
HTN (hypertension)

## 2021-02-02 NOTE — DISCHARGE NOTE PROVIDER - CARE PROVIDER_API CALL
Dick Rose)  Internal Medicine; Nephrology  [address] [place] [address]  Phone: [phone]  Fax: [phone]  Follow Up Time:     Chris Borja)  Gastroenterology; Internal Medicine  [address] [place] [id number]  Phone: [phone]  Fax: [phone]  Follow Up Time:     Primary care physician,   Phone: (   )    -  Fax: (   )    -  Follow Up Time:     [facility],   Phone: (   )    -  Fax: (   )    -  Follow Up Time:    Dick Rose)  Internal Medicine; Nephrology  [address] [place] [address]  Phone: [phone]  Fax: [phone]  Follow Up Time:     Primary care physician,   Phone: (   )    -  Fax: (   )    -  Follow Up Time:     [facility],   Phone: (   )    -  Fax: (   )    -  Follow Up Time:     Vince Aragon)  Gastroenterology; Internal Medicine  [address] [place] [address], [address]  [place] [address]  Phone: [phone]  Fax: [phone]  Established Patient  Follow Up Time: 1 week

## 2021-02-02 NOTE — PROGRESS NOTE ADULT - ATTENDING COMMENTS
Dr. Antoinette Guerra   Division of Hospital Medicine  [facility]   Pager: [phone]
Dr. Antoinette Guerra   Division of Hospital Medicine  [facility]   Pager: [phone]
Dr. Antoinette Guerra   Division of Hospital Medicine  [facility]   Pager: [phone]
No active bleeding  Post large polypectomy  NO gi contraindication to DC home  Outpatient GI clinic follow up
Agree with above. Patient did not finish bowel prep, still has solid formed stools. He refused to finish the prep today and is hesitant to have capsule endoscopy. Will revisit with patient today, if he's agreeable can potentially still do capsule endoscopy to evaluate rest of small bowel. In the meantime, monitor CBC, transfuse as needed. Can stop PPI drip - push enteroscopy showed no upper GI bleeding.
Dr. Antoinette Guerra   Division of Hospital Medicine  [facility]   Pager: [phone]
Dr. Antoinette Guerra   Division of Hospital Medicine  [facility]   Pager: [phone]

## 2021-02-02 NOTE — DISCHARGE NOTE NURSING/CASE MANAGEMENT/SOCIAL WORK - NSDCPEEMAIL_GEN_ALL_CORE
[facility] for Tobacco [facility] email [email or web address]

## 2021-02-02 NOTE — DISCHARGE NOTE PROVIDER - NSDCMRMEDTOKEN_GEN_ALL_CORE_FT
epoetin ye: 32315 unit(s) hemodialysis 3 times a week   famotidine 20 mg oral tablet: 1 tab(s) orally once a day  gabapentin 300 mg oral tablet: 1 tab(s) orally once a day  hydrALAZINE 50 mg oral tablet: 1 tab(s) orally 3 times a day  labetalol 200 mg oral tablet: 1 tab(s) orally 2 times a day  lisinopril 20 mg oral tablet: 1 tab(s) orally once a day  melatonin 5 mg oral tablet: 1 tab(s) orally once a day (at bedtime)  Nicoderm C-Q Clear 21 mg/24 hr transdermal film, extended release: 21 milligram(s) transdermal once a day   pantoprazole 40 mg oral delayed release tablet: 1 tab(s) orally once a day (before a meal)  Renagel 800 mg oral tablet: 2 tab(s) orally 3 times a day  simvastatin 20 mg oral tablet: 1 tab(s) orally once a day (at bedtime)

## 2021-02-02 NOTE — DISCHARGE NOTE NURSING/CASE MANAGEMENT/SOCIAL WORK - PATIENT PORTAL LINK FT
You can access the FollowMyHealth Patient Portal offered by [facility] by registering at the following website: [email or web address]. By joining [employer]’s FollowMyHealth portal, you will also be able to view your health information using other applications (apps) compatible with our system.

## 2021-02-02 NOTE — DISCHARGE NOTE NURSING/CASE MANAGEMENT/SOCIAL WORK - NSDCPEWEB_GEN_ALL_CORE
[facility] for Tobacco Control website --- [email or web address] website --- [email or web address]

## 2021-02-02 NOTE — PROGRESS NOTE ADULT - PROBLEM SELECTOR PLAN 4
c/w current meds  uf with hd  trend bp
hold Hydralazine, BP elevated  restart home labetalol today
c/w current meds  uf with hd  trend bp
Holding home Labetalol and Hydralazine, BP acceptable and likely lower than usual given decrease plasma volume.
c/w Nicotine patch while in house
c/w current meds  uf with hd  trend bp
c/w labetalol 200mg BID  bp stable, restart hydralazine 50mg TID (home dose 100mg TId  monitor bp
c/w Nicotine patch while in house
c/w labetalol 200mg BID  bp stable, increase back to home hydralazine 100mg tid  monitor bp
hold Hydralazine   c/w labetalol restarted 1/30  restart hydralazine is BP remains stable tomorrow

## 2021-02-02 NOTE — DISCHARGE NOTE PROVIDER - PROVIDER TOKENS
PROVIDER:[TOKEN:[[id number]]],PROVIDER:[TOKEN:[[id number]]],FREE:[LAST:[Primary care physician],PHONE:[(   )    -],FAX:[(   )    -]],FREE:[LAST:[[facility]],PHONE:[(   )    -],FAX:[(   )    -]] PROVIDER:[TOKEN:[[id number]]],FREE:[LAST:[Primary care physician],PHONE:[(   )    -],FAX:[(   )    -]],FREE:[LAST:[[facility]],PHONE:[(   )    -],FAX:[(   )    -]],PROVIDER:[TOKEN:[[id number]],FOLLOWUP:[1 week],ESTABLISHEDPATIENT:[T]]

## 2021-02-02 NOTE — PROGRESS NOTE ADULT - PROBLEM SELECTOR PROBLEM 2
ESRD on hemodialysis
Anemia
Anemia
Flat affect
ESRD on hemodialysis
Flat affect
Anemia
Flat affect

## 2021-02-02 NOTE — PROGRESS NOTE ADULT - SUBJECTIVE AND OBJECTIVE BOX
Chief Complaint:  Patient is a 55y old  Male who presents with a chief complaint of Fatigue (2021 14:31)      Interval Events:   Colonoscopy yesterday, two large polyps removed. Hemoglobin stable today.    Allergies:  No Known Allergies      Hospital Medications:  bisacodyl 5 milliGRAM(s) Oral once PRN  chlorhexidine 4% Liquid 1 Application(s) Topical once  epoetin ye-epbx (RETACRIT) Injectable 02663 Unit(s) IV Push <User Schedule>  gabapentin 300 milliGRAM(s) Oral daily  hydrALAZINE 50 milliGRAM(s) Oral three times a day  influenza   Vaccine 0.5 milliLiter(s) IntraMuscular once  labetalol 200 milliGRAM(s) Oral every 12 hours  melatonin 5 milliGRAM(s) Oral at bedtime  nicotine - 21 mG/24Hr(s) Patch 1 patch Transdermal daily  pantoprazole    Tablet 40 milliGRAM(s) Oral before breakfast  sevelamer carbonate 1600 milliGRAM(s) Oral three times a day  simvastatin 20 milliGRAM(s) Oral at bedtime      PMHX/PSHX:  ESRD (end stage renal disease) on dialysis    HLD (hyperlipidemia)    HTN (hypertension)    Tobacco use    Vitamin D deficiency    Hyperlipidemia    ESRD on hemodialysis    GERD (gastroesophageal reflux disease)    Hypertension    End Stage Renal Disease    Benign Essential Hypertension    No significant past surgical history    AV fistula    History of open reduction and internal fixation (ORIF) procedure        Family history:  Family history of hypertension in mother    Family history of lymphoma    Family history of chronic renal disease    Family history of lymphoma        ROS:  General: no night sweats, wt loss  CV: no pain, palpitation  Resp: no cough wheezing  Muscles: no weakness or pain  Endocrine: no polyuria, polydipsia, cold/heat intolerance  Heme: No petechia, ecchymosis    PHYSICAL EXAM:   GENERAL:  NAD  HEENT:  NC/AT,  conjunctivae clear, sclera -anicteric  CHEST:  Full & symmetric excursion, no increased  HEART:  Regular rhythm  ABDOMEN:  Soft, non-tender, non-distended, normoactive bowel sounds,  no masses ,no hepato-splenomegaly,   EXTREMITIES:  no cyanosis,clubbing or edema  SKIN:  No rash/erythema/ecchymoses/petechiae/wounds/abscess/warm/dry  NEURO:  Alert, oriented    Vital Signs:  Vital Signs Last 24 Hrs  T(C): 36.8 (2021 05:27), Max: 37.2 (2021 14:50)  T(F): 98.3 (2021 05:27), Max: 99 (2021 23:30)  HR: 80 (2021 05:27) (68 - 87)  BP: 151/84 (2021 05:27) (106/52 - 169/70)  BP(mean): --  RR: 18 (2021 05:27) (14 - 20)  SpO2: 98% (2021 05:27) (98% - 100%)  Daily Height in cm: 177.8 (2021 10:41)    Daily Weight in k.4 (2021 19:14)    LABS:                        7.7    7.72  )-----------( 150      ( 2021 06:08 )             23.8     02-02    133<L>  |  95<L>  |  23  ----------------------------<  85  3.7   |  27  |  8.70<H>    Ca    8.6      2021 06:07  Phos  4.1     02-02  Mg     1.9     02-02                Imaging:

## 2021-02-02 NOTE — PROGRESS NOTE ADULT - ASSESSMENT
55M with PMHx of ESRD (M/W/F), HTN, & GERD presents to [facility] with two day history of fatigue, dyspnea on exertion and melena. Patient found to have a normocytic anemia with hemoglobin of 4.8 (baseline 8-9). Patient likely suffering from symptomatic anemia due to possible GIB

## 2021-02-02 NOTE — DISCHARGE NOTE PROVIDER - NSDCCPCAREPLAN_GEN_ALL_CORE_FT
PRINCIPAL DISCHARGE DIAGNOSIS  Diagnosis: Anemia  Assessment and Plan of Treatment: resolved  - s/p 4uPRBC transfused      SECONDARY DISCHARGE DIAGNOSES  Diagnosis: Dyspnea  Assessment and Plan of Treatment: resolved - 2/2 anemia     PRINCIPAL DISCHARGE DIAGNOSIS  Diagnosis: Anemia  Assessment and Plan of Treatment: resolved  - s/p 4uPRBC transfused  followup with GI for results of colon polyp biopsy within 1 week Dr Aragon'  you will also need another colonscopy in 6 months, f/u with GI      SECONDARY DISCHARGE DIAGNOSES  Diagnosis: Colon polyp  Assessment and Plan of Treatment: resolved  - s/p 4uPRBC transfused  followup with GI for results of colon polyp biopsy within 1 week Dr Aragon'  you will also need another colonscopy in 6 months, f/u with GI    Diagnosis: ESRD on hemodialysis  Assessment and Plan of Treatment: continue hemodialysis  f/u renal    Diagnosis: Dyspnea  Assessment and Plan of Treatment: resolved - 2/2 anemia

## 2021-02-02 NOTE — PROGRESS NOTE ADULT - PROVIDER SPECIALTY LIST ADULT
Gastroenterology
Gastroenterology
Nephrology
Hospitalist
Nephrology
Hospitalist
Nephrology
Hospitalist

## 2021-02-02 NOTE — DISCHARGE NOTE PROVIDER - HOSPITAL COURSE
55M with PMHx of ESRD (M/W/F), HTN, & GERD presents to [facility] with two day history of fatigue, dyspnea on exertion and melena. Patient states one day prior to admission he began to have more fatigue, decrease exercise tolerance, dyspnea on exertion and "dark" stools. Patient denies abdominal pain, anti-coagulant or anti-platelet use. No known use of NSAIDs or iron supplements. Given fatigue patient is presenting today. He denies sick contacts, cough, or fever. No known COVID-19 contacts. He states something similar to this happened five years ago and he required EGD. Per chart review he had one actively bleeding angiectasia in the duodenum that was cauterized. In the ED patient given Protonix 80 mg and then given 1 unit PRBC.     Problem/Plan - 1:  ·  Problem: Anemia.  Plan: Need to rule out UGIB given melena and symptoms of symptomatic anemia. Prior history of bleeding angioectasia in the duodenum  -s/p Protonix 80, continue with Protonix 40 mg IV BID  -NPO for now  -Goal Hg of 7, plan to give two units, check post-transfusion CBC Total transfused in hospital stay - 4uPRBC. Upon d/c - hb stable, no active bleeding   -Doubt aplasia or significant nutritional deficiency causing hypoproliferative anemia  -[facility] consult (emailed) - s/p (1/28) EGD, (1/29) Capsule study, (2/1) Colonoscopy with removal of polyps. F/up bx results  -Serial CBCs, maintain two large bore PIV.   - Will need outpatient GI f/u for biopsy results and renal/PCP within 1 week after d/c    Problem/Plan - 2:  ·  Problem: ESRD on hemodialysis.  Plan: -Consult [facility] ([facility] for maintenance HD, ideally would like to dialyze prior to possible procedure, would also check CBC prior to dialysis since pRBC can be given during.     Problem/Plan - 3:  ·  Problem: HTN (hypertension).  Plan: Holding home Labetalol and Hydralazine, BP acceptable and likely lower than usual given decrease plasma volume.   Upon d/c - Labetalol 200mg BID, Hydralazine 50mg TID (home dose is 100mg TID)    Problem/Plan - 4:  ·  Problem: Tobacco use.  Plan: Performed smoking cessation counseling with patient. Reviewed modalities such as nicotine replacement therapy and pharmacological agents. Also reviewed deleterious effects of smoking such as increased malignancy and infection. 3 minutes spent with patient.   Start Nicotine patch while in house.     Problem/Plan - 6:  ·  Problem: Flat affect.  Plan: Patient with flat affect, continues to refuse meds, blood draws intermittently   - psych consult called no contraindication for d/c.     Problem/Plan - 7:  ·  Problem: ESRD on hemodialysis.  Plan: -Nephrology consult appreciated:  -c/w 1600 mg TID   -HD 2/1, CM to reinstate HD for Wednesday 2/3 and family will pick him up.     Vital Signs Last 24 Hrs  T(C): 36.8 (02-02-21 @ 05:27), Max: 37.2 (02-01-21 @ 14:50)  T(F): 98.3 (02-02-21 @ 05:27), Max: 99 (02-01-21 @ 23:30)  HR: 80 (02-02-21 @ 05:27) (68 - 80)  BP: 151/84 (02-02-21 @ 05:27) (106/52 - 169/70)  RR: 18 (02-02-21 @ 05:27) (15 - 20)  SpO2: 98% (02-02-21 @ 05:27) (98% - 100%)               7.7    7.72  )-----------( 150      ( 02-02 @ 06:08 )             23.8                7.8    7.78  )-----------( 124      ( 02-01 @ 01:48 )             24.0                8.0    7.67  )-----------( 122      ( 01-31 @ 06:08 )             24.6                8.2    7.75  )-----------( 139      ( 01-30 @ 21:52 )             25.9      55M with PMHx of ESRD (M/W/F), HTN, & GERD presents to [facility] with two day history of fatigue, dyspnea on exertion and melena. Patient found to have a normocytic anemia with hemoglobin of 4.8 (baseline 8-9). Patient likely suffering from symptomatic anemia due to possible GIB     Problem/Plan - 1:  ·  Problem: Anemia.  Plan: s/p GIB, + FOBT Prior history of bleeding angiectasia in the duodenum s/p 4 units PRBC (last prbc on 1/29)  - c/w ppi daily  - GI rec appreciated: s/p enteroscopy no active bleeding + esophogeal polyps biopsied  - s/p capsule study 1/29: per d/w Gi fellow Dr. Puentes no active bleed noted in small bowel  -s/p colonoscopy 2/1: Diverticulosis in the sigmoid colon. Two 15 to 20 mm polyps in the sigmoid colon and in the descending colon, Unlikely source of prior blood loss Erythematous mucosa in the sigmoid colon. Biopsied.  -informed patient he has to r/u with biopsy results and repeat colonoscopy in  month  -hgb remains stable with no bleeding, HD stable  -outpatient f/u with Dr sim for biopsy results and repeat colonoscopy.      Problem/Plan - 2:  ·  Problem: Flat affect.  Plan: Patient with flat affect, continues to refuse meds, blood drawns intermittently   - psych consult called no contraindication for d/c.      Problem/Plan - 3:  ·  Problem: ESRD on hemodialysis.  Plan: -Nephrology consult appreciated:  -c/w 1600 mg TID   -HD 2/1, Hd restarted for 2/3.      Problem/Plan - 4:  ·  Problem: HTN (hypertension).  Plan: c/w labetalol 200mg BID  bp stable, increase back to home hydralazine 100mg tid  monitor bp.      Problem/Plan - 5:  ·  Problem: Tobacco use.  Plan: c/w Nicotine patch  Dispo: discharge home today with outpatient GI f/u Dr sim, PCP/renal f/u 1 week  spent 45 min on d/c time.

## 2021-02-02 NOTE — PROGRESS NOTE ADULT - PROBLEM SELECTOR PROBLEM 3
Renal osteodystrophy
Renal osteodystrophy
ESRD on hemodialysis
ESRD on hemodialysis
Renal osteodystrophy
HTN (hypertension)
ESRD on hemodialysis
ESRD on hemodialysis
HTN (hypertension)
ESRD on hemodialysis

## 2021-02-02 NOTE — PROGRESS NOTE ADULT - SUBJECTIVE AND OBJECTIVE BOX
Patient is a 55y old  Male who presents with a chief complaint of Fatigue (02 Feb 2021 11:43)      SUBJECTIVE / OVERNIGHT EVENTS: No ON events. Feels well, denies cp, sob, blood in stools, melena, wants to go home today        ADDITIONAL REVIEW OF SYSTEMS: Negative except for above    MEDICATIONS  (STANDING):  chlorhexidine 4% Liquid 1 Application(s) Topical once  epoetin ye-epbx (RETACRIT) Injectable 55377 Unit(s) IV Push <User Schedule>  gabapentin 300 milliGRAM(s) Oral daily  hydrALAZINE 100 milliGRAM(s) Oral three times a day  influenza   Vaccine 0.5 milliLiter(s) IntraMuscular once  labetalol 200 milliGRAM(s) Oral every 12 hours  melatonin 5 milliGRAM(s) Oral at bedtime  nicotine - 21 mG/24Hr(s) Patch 1 patch Transdermal daily  pantoprazole    Tablet 40 milliGRAM(s) Oral before breakfast  sevelamer carbonate 1600 milliGRAM(s) Oral three times a day  simvastatin 20 milliGRAM(s) Oral at bedtime    MEDICATIONS  (PRN):  bisacodyl 5 milliGRAM(s) Oral once PRN Constipation      CAPILLARY BLOOD GLUCOSE        I&O's Summary    01 Feb 2021 07:01  -  02 Feb 2021 07:00  --------------------------------------------------------  IN: 0 mL / OUT: 1500 mL / NET: -1500 mL        PHYSICAL EXAM:  Vital Signs Last 24 Hrs  T(C): 36.9 (02 Feb 2021 12:04), Max: 37.2 (01 Feb 2021 14:50)  T(F): 98.4 (02 Feb 2021 12:04), Max: 99 (01 Feb 2021 23:30)  HR: 75 (02 Feb 2021 12:04) (70 - 80)  BP: 150/80 (02 Feb 2021 12:04) (150/80 - 169/70)  BP(mean): --  RR: 18 (02 Feb 2021 12:04) (16 - 20)  SpO2: 98% (02 Feb 2021 12:04) (98% - 99%)    PHYSICAL EXAM:  GENERAL: NAD, well-developed  HEAD:  Atraumatic, Normocephalic  EYES:  conjunctiva and sclera clear  NECK: Supple, No JVD  CHEST/LUNG: Clear to auscultation bilaterally; No wheeze  HEART: Regular rate and rhythm;   ABDOMEN: Soft, Nontender, Nondistended; Bowel sounds present  EXTREMITIES:  2+ Peripheral Pulses, No clubbing, cyanosis, or edema  PSYCH: AAOx3  NEUROLOGY: non-focal      LABS:                        7.7    7.72  )-----------( 150      ( 02 Feb 2021 06:08 )             23.8     02-02    133<L>  |  95<L>  |  23  ----------------------------<  85  3.7   |  27  |  8.70<H>    Ca    8.6      02 Feb 2021 06:07  Phos  4.1     02-02  Mg     1.9     02-02                  RADIOLOGY & ADDITIONAL TESTS:    Imaging Personally Reviewed:    Electrocardiogram Personally Reviewed:    COORDINATION OF CARE:  Care Discussed with Consultants/Other Providers [Y/N]:  Prior or Outpatient Records Reviewed [Y/N]:

## 2021-02-02 NOTE — PROGRESS NOTE ADULT - PROBLEM SELECTOR PLAN 3
Holding home Labetalol and Hydralazine, BP acceptable and likely lower than usual given decrease plasma volume.
c/w renvela  trend darryl and phps
c/w renvela  trend darryl and phps
-Nephrology consult appreciated: HD today  -Would also check CBC prior to dialysis since pRBC can be given during
Holding home Labetalol and Hydralazine, BP acceptable and likely lower than usual given decrease plasma volume.
-Nephrology consult appreciated:  - noted to have hyperphosphatemia, Renvela dose adjusted today 1/30 (was taking 1600 mg TID at home ordered at 800 mg TID here)
-Nephrology consult appreciated:  -c/w 1600 mg TID   -HD 2/1, Hd restarted for 2/3
c/w renvela  trend darryl and phss
-Nephrology consult appreciated:  - noted to have hyperphosphatemia, Renvela dose adjusted 1/30 (was taking 1600 mg TID at home ordered at 800 mg TID here)  - monitor Mg and P
-Nephrology consult appreciated:  -c/w 1600 mg TID   -HD today, CM to reinstate HD if family picks him up

## 2021-02-02 NOTE — PROGRESS NOTE ADULT - NSHPATTENDINGPLANDISCUSS_GEN_ALL_CORE
NP Renée
Medicine NP Sindi
ANGELA Rowell
Medicine ANGELA Funk
Medicine ANGELA Funk
Medicine KIERAN Chinchilla
Medicine NP Renée

## 2021-02-02 NOTE — PROGRESS NOTE ADULT - PROBLEM SELECTOR PLAN 2
Patient with flat affect, continues to refuse meds, blood drawns intermittently   - psych consult called no contraindication for d/c   - He does not want me to update/speak to family at this time.
anemia in ckd/GIB  karolyn with hd  s/p prbcs  f/u GI
-Nephrology consult appreciated: HD today  -Would also check CBC prior to dialysis since pRBC can be given during
Patient with flat affect, this morning was refusing HD and medically necessary procedures, including IV meds  - is redirectable now willing to proceed with capsule study GI informed  - psych consult called f/u rec  - He does not want me to update/speak to family at this time.
Patient with flat affect, continues to refuse meds, blood drawns intermittently   - psych consult called no contraindication for d/c   - He does not want me to update/speak to family at this time.
anemia in ckd/GIB  karolyn with hd  s/p prbcs  f/u GI
-Nephrology consult appreciated: HD today  -Would also check CBC prior to dialysis since pRBC can be given during
anemia in ckd/GIB  karolyn with hd  s/p prbcs  f/u GI
Patient with flat affect, continues to refuse meds, blood drawns intermittently   - psych consult called no contraindication for d/c
Patient with flat affect, continues to refuse meds, blood drawns intermittently   - psych consult called no contraindication for d/c

## 2021-02-02 NOTE — PROGRESS NOTE ADULT - PROBLEM SELECTOR PROBLEM 1
Anemia
ESRD on hemodialysis
ESRD on hemodialysis
Anemia
Anemia
ESRD on hemodialysis
Anemia
Anemia

## 2021-02-02 NOTE — PROGRESS NOTE ADULT - ASSESSMENT
Impression:  55M with PMHx of ESRD (M/W/F), HTN, & GERD presents to [facility] with two day history of fatigue, dyspnea on exertion and dark stools found to have anemia  #Anemia - likely from upper GI bleed given dark stools.  EGD and capsule negative.  Colon without active bleeding but multiple possible sources for anemia seen.    Recommendations:   - monitor hemoglobin   - transfuse as needed   - two active IVs at all times   - patient will need repeat colonoscopy within 6 months as multiple small polyps left in place   - f/u pathology   - no further inpatient GI recommendations    Felicita Sher  Gastroenterology Fellow  Pager x [phone] or [phone]  Please page on-call Fellow on weekends/holidays or after 5 pm and before 8 am on weekdays   On-call GI fellow can be contacted via the answering service ([phone])

## 2021-02-02 NOTE — PROGRESS NOTE ADULT - PROBLEM SELECTOR PLAN 1
Plan for routine HD today  2k bath and uf 2 to 3 kg   trend bmp
s/p GIB, + FOBT Prior history of bleeding angioectasia in the duodenum  - Hb 6.9 this morning s/p 1 unit PRBC today, total 4 units since admission  - Protonix Gtt d/c'ed as no evidence of UGIB, started on protonix IV BID  - GI rec appreciated: s/p enteroscopy no active bleeding + esophogeal polyps biopsied  - Per d/w GI plan for capsule study today 1/29  - NPO for procedure, start clears 1: 30 pm and regular diet 3:30 pm  - NPO Sunday for colonoscopy Monday, place on clears sunday, Golytely Sunday night  - Goal Hg of 7,  - monitor CBC and BMP
s/p GIB, + FOBT Prior history of bleeding angioectasia in the duodenum  - s/p 3 units PRBC   - c/w Protonix Gtt  - GI rec appreciated: s/p enteroscopy no active bleeding + esophogeal polyps biopsied, plan for colonoscopy  tomorrow +/- capsule endoscopy.  - started on clear liquid diet per GI rec  - NPO midnight   - Goal Hg of 7,  - monitor CBC and BMP
Plan for routine HD today  2k bath  s/p endoscopy  trend BMP
Plan for routine HD on monday  Will liaise with Gastroenterology on timing of colonoscopy and HD   trend bmp
Need to rule out UGIB given melena and symptoms of symptomatic anemia. Prior history of bleeding angioectasia in the duodenum  - receiving 2nd PRBC now f/u posttx CBC  - started on Protonix Gtt  - GI rec noted: NPO for EGD today/ tomorrow   -Goal Hg of 7, plan to give two units, check post-transfusion CBC  -Serial CBCs, maintain two large bore PIV
s/p GIB, + FOBT Prior history of bleeding angiectasia in the duodenum s/p 4 units PRBC (last prbc on 1/29)  - change ppi to po daily  - GI rec appreciated: s/p enteroscopy no active bleeding + esophogeal polyps biopsied  - s/p capsule study 1/29: per d/w Gi fellow Dr. Puentes no active bleed noted in small bowel  -s/p colonoscopy today Diverticulosis in the sigmoid colon. Two 15 to 20 mm polyps in the sigmoid colon and in the descending colon, Unlikely source of prior blood loss Erythematous mucosa in the sigmoid colon. Biopsied.  -informed patient he has to r/u with biopsy results  -hgb remains stable with no bleeding, HD stable
s/p GIB, + FOBT Prior history of bleeding angioectasia in the duodenum s/p 4 units PRBC  - Hb stable 8.5  - Protonix Gtt d/c'ed as no evidence of UGIB, started on protonix IV BID  - GI rec appreciated: s/p enteroscopy no active bleeding + esophogeal polyps biopsied  - s/p capsule study yesterday 1/29: awaiting rec from GI   - Patient started on regular diet   - NPO Sunday for colonoscopy Monday, place on clears sunday, Golytely Sunday night  - Goal Hg of 7,  - monitor CBC and BMP
s/p GIB, + FOBT Prior history of bleeding angiectasia in the duodenum s/p 4 units PRBC (last prbc on 1/29)  - c/w ppi daily  - GI rec appreciated: s/p enteroscopy no active bleeding + esophogeal polyps biopsied  - s/p capsule study 1/29: per d/w Gi fellow Dr. Puentes no active bleed noted in small bowel  -s/p colonoscopy 2/1: Diverticulosis in the sigmoid colon. Two 15 to 20 mm polyps in the sigmoid colon and in the descending colon, Unlikely source of prior blood loss Erythematous mucosa in the sigmoid colon. Biopsied.  -informed patient he has to r/u with biopsy results and repeat colonoscopy in  month  -hgb remains stable with no bleeding, HD stable  -outpatient f/u with Dr sim for biopsy results and repeat colonoscopy
s/p GIB, + FOBT Prior history of bleeding angioectasia in the duodenum s/p 4 units PRBC  - Hb stable 8  - Protonix Gtt d/c'ed as no evidence of UGIB, started on protonix IV BID  - GI rec appreciated: s/p enteroscopy no active bleeding + esophogeal polyps biopsied  - s/p capsule study 1/29: per d/w Gi fellow Dr. Puentes no active bleed noted in small bowel  - Patient started on clears today  - NPO midnight for colonoscopy tomorrow, Golytely ordered by GI  - Goal Hg of 7,  - 2am CBC and BMP ordered

## 2021-02-03 LAB
SURGICAL PATHOLOGY STUDY: SIGNIFICANT CHANGE UP
SURGICAL PATHOLOGY STUDY: SIGNIFICANT CHANGE UP

## 2021-02-23 NOTE — ED PROVIDER NOTE - CRTICAL CARE TIME SPENT (MIN)
50 Drysol Pregnancy And Lactation Text: This medication is considered safe during pregnancy and breast feeding.

## 2021-03-10 ENCOUNTER — APPOINTMENT (OUTPATIENT)
Dept: GASTROENTEROLOGY | Facility: CLINIC | Age: 56
End: 2021-03-10

## 2021-03-11 ENCOUNTER — LABORATORY RESULT (OUTPATIENT)
Age: 56
End: 2021-03-11

## 2021-03-11 ENCOUNTER — NON-APPOINTMENT (OUTPATIENT)
Age: 56
End: 2021-03-11

## 2021-03-11 ENCOUNTER — APPOINTMENT (OUTPATIENT)
Dept: CARDIOLOGY | Facility: CLINIC | Age: 56
End: 2021-03-11
Payer: MEDICAID

## 2021-03-11 VITALS
BODY MASS INDEX: 26.48 KG/M2 | SYSTOLIC BLOOD PRESSURE: 134 MMHG | DIASTOLIC BLOOD PRESSURE: 76 MMHG | HEIGHT: 70 IN | WEIGHT: 185 LBS | HEART RATE: 79 BPM | OXYGEN SATURATION: 98 %

## 2021-03-11 DIAGNOSIS — Z86.79 PERSONAL HISTORY OF OTHER DISEASES OF THE CIRCULATORY SYSTEM: ICD-10-CM

## 2021-03-11 DIAGNOSIS — Z78.9 OTHER SPECIFIED HEALTH STATUS: ICD-10-CM

## 2021-03-11 DIAGNOSIS — F17.200 NICOTINE DEPENDENCE, UNSPECIFIED, UNCOMPLICATED: ICD-10-CM

## 2021-03-11 DIAGNOSIS — Z87.448 PERSONAL HISTORY OF OTHER DISEASES OF URINARY SYSTEM: ICD-10-CM

## 2021-03-11 PROCEDURE — 99205 OFFICE O/P NEW HI 60 MIN: CPT

## 2021-03-11 PROCEDURE — 99072 ADDL SUPL MATRL&STAF TM PHE: CPT

## 2021-03-11 PROCEDURE — 93000 ELECTROCARDIOGRAM COMPLETE: CPT

## 2021-03-11 PROCEDURE — 99215 OFFICE O/P EST HI 40 MIN: CPT

## 2021-03-11 RX ORDER — RENAGEL 400 MG/1
400 TABLET ORAL
Refills: 0 | Status: ACTIVE | COMMUNITY

## 2021-03-11 RX ORDER — GABAPENTIN 300 MG/1
300 CAPSULE ORAL
Refills: 0 | Status: ACTIVE | COMMUNITY

## 2021-03-12 NOTE — HISTORY OF PRESENT ILLNESS
[FreeTextEntry1] : Pt presents as new pt for cardiac evaluation, pt also needs new PCP to get a GI referral because he was recently hospitalized at [facility] for GI bleed. Pt went to the hospital originally for shortness of breath and was told he had a GI bleed, he received multiple blood transfusion and had a colonoscopy at the hospital where two polyps were removed and biopsied, results are pending. Pt has a history of CKD, on hemodialysis Monday Wednesday Friday- followed by Dr. Avilez. Pt denies any more shortness of breath, denies chest pain, abdominal pain, nausea/vomiting, cough, fever.

## 2021-03-12 NOTE — PHYSICAL EXAM
[General Appearance - Well Developed] : well developed [Normal Appearance] : normal appearance [Well Groomed] : well groomed [General Appearance - Well Nourished] : well nourished [No Deformities] : no deformities [General Appearance - In No Acute Distress] : no acute distress [Normal Conjunctiva] : the conjunctiva exhibited no abnormalities [Eyelids - No Xanthelasma] : the eyelids demonstrated no xanthelasmas [Normal Oral Mucosa] : normal oral mucosa [No Oral Pallor] : no oral pallor [No Oral Cyanosis] : no oral cyanosis [Normal Jugular Venous A Waves Present] : normal jugular venous A waves present [Normal Jugular Venous V Waves Present] : normal jugular venous V waves present [No Jugular Venous Salcido A Waves] : no jugular venous salcido A waves [Respiration, Rhythm And Depth] : normal respiratory rhythm and effort [Exaggerated Use Of Accessory Muscles For Inspiration] : no accessory muscle use [Auscultation Breath Sounds / Voice Sounds] : lungs were clear to auscultation bilaterally [Abdomen Soft] : soft [Abdomen Tenderness] : non-tender [Abdomen Mass (___ Cm)] : no abdominal mass palpated [Abnormal Walk] : normal gait [Gait - Sufficient For Exercise Testing] : the gait was sufficient for exercise testing [Nail Clubbing] : no clubbing of the fingernails [Cyanosis, Localized] : no localized cyanosis [Petechial Hemorrhages (___cm)] : no petechial hemorrhages [Skin Color & Pigmentation] : normal skin color and pigmentation [] : no rash [No Venous Stasis] : no venous stasis [Skin Lesions] : no skin lesions [No Skin Ulcers] : no skin ulcer [No Xanthoma] : no  xanthoma was observed [Oriented To Time, Place, And Person] : oriented to person, place, and time [Affect] : the affect was normal [Mood] : the mood was normal [No Anxiety] : not feeling anxious [FreeTextEntry1] : Regular rate and rhythm, NL S1, S2, non-displaced PMI, chest non-tender; no rubs,heaves  or gallops a  Grade 2/6 systolic murmur noted at the LSB

## 2021-03-15 ENCOUNTER — APPOINTMENT (OUTPATIENT)
Dept: GASTROENTEROLOGY | Facility: CLINIC | Age: 56
End: 2021-03-15
Payer: MEDICAID

## 2021-03-15 VITALS
SYSTOLIC BLOOD PRESSURE: 140 MMHG | TEMPERATURE: 98.25 F | OXYGEN SATURATION: 99 % | HEIGHT: 70 IN | WEIGHT: 190 LBS | HEART RATE: 90 BPM | DIASTOLIC BLOOD PRESSURE: 80 MMHG | BODY MASS INDEX: 27.2 KG/M2

## 2021-03-15 DIAGNOSIS — Z87.19 PERSONAL HISTORY OF OTHER DISEASES OF THE DIGESTIVE SYSTEM: ICD-10-CM

## 2021-03-15 DIAGNOSIS — Z86.79 PERSONAL HISTORY OF OTHER DISEASES OF THE CIRCULATORY SYSTEM: ICD-10-CM

## 2021-03-15 DIAGNOSIS — D12.6 BENIGN NEOPLASM OF COLON, UNSPECIFIED: ICD-10-CM

## 2021-03-15 DIAGNOSIS — Z80.7 FAMILY HISTORY OF OTHER MALIGNANT NEOPLASMS OF LYMPHOID, HEMATOPOIETIC AND RELATED TISSUES: ICD-10-CM

## 2021-03-15 DIAGNOSIS — K62.5 HEMORRHAGE OF ANUS AND RECTUM: ICD-10-CM

## 2021-03-15 PROCEDURE — 99072 ADDL SUPL MATRL&STAF TM PHE: CPT

## 2021-03-15 PROCEDURE — 99204 OFFICE O/P NEW MOD 45 MIN: CPT | Mod: 25

## 2021-03-15 PROCEDURE — 99214 OFFICE O/P EST MOD 30 MIN: CPT | Mod: 25

## 2021-03-15 RX ORDER — PANTOPRAZOLE SODIUM 40 MG/10ML
40 INJECTION, POWDER, FOR SOLUTION INTRAVENOUS
Refills: 0 | Status: ACTIVE | COMMUNITY

## 2021-03-15 RX ORDER — CHLORHEXIDINE GLUCONATE 4 %
LIQUID (ML) TOPICAL
Refills: 0 | Status: ACTIVE | COMMUNITY

## 2021-03-15 RX ORDER — NICOTINE 21 MG/24H
21 PATCH, EXTENDED RELEASE TRANSDERMAL
Refills: 0 | Status: ACTIVE | COMMUNITY

## 2021-03-15 NOTE — HISTORY OF PRESENT ILLNESS
[FreeTextEntry1] : Patient is a 56-year-old gentleman who was hospitalized in February for anemia and GI blood loss that required 4 units of packed cells.  He underwent a colonoscopy that revealed 2 colon polyps that were removed these were tubular adenomas.  He also underwent an upper endoscopy and had an esophageal papilloma removed.  He had a small hiatus hernia but no other source for the bleeding.  He underwent a capsule endoscopy that also did not reveal any evidence of small bowel bleeding.\par He suffers from ESRD and is on dialysis.  His hemoglobin in the hospital was 4.8.  Most recent blood work revealed H/H8.2/26.3, MCV 95.6, platelets 189k, WBC 8.58, creatinine 6.75.\par His bowel movements are now regular without any melena or bleeding.  He has no abdominal pain and no heartburn.

## 2021-03-15 NOTE — REASON FOR VISIT
[Initial Evaluation] : an initial evaluation [FreeTextEntry1] : ESRD, s/p GI bleed, Hx of colon polyps

## 2021-03-15 NOTE — ASSESSMENT
[FreeTextEntry1] : Patient is status post anemia secondary to GI bleeding.  He underwent a work-up including upper endoscopy and Colonoscopy.  Several polyps were removed from the colon.  These were benign tubular adenomas.\par Patient will continue famotidine and we will continue to monitor CBCs.  FOBT will be sent to the lab.

## 2021-03-15 NOTE — REVIEW OF SYSTEMS
[As Noted in HPI] : as noted in HPI [Negative] : Heme/Lymph [FreeTextEntry2] : On dialysis [FreeTextEntry8] : ESRD

## 2021-03-16 ENCOUNTER — APPOINTMENT (OUTPATIENT)
Dept: CARDIOLOGY | Facility: CLINIC | Age: 56
End: 2021-03-16
Payer: MEDICAID

## 2021-03-16 PROCEDURE — 99072 ADDL SUPL MATRL&STAF TM PHE: CPT

## 2021-03-16 PROCEDURE — 93306 TTE W/DOPPLER COMPLETE: CPT

## 2021-03-17 LAB
ALBUMIN SERPL ELPH-MCNC: 3.6 G/DL
ALP BLD-CCNC: 101 U/L
ALT SERPL-CCNC: 7 U/L
ANION GAP SERPL CALC-SCNC: 14 MMOL/L
AST SERPL-CCNC: 14 U/L
BASOPHILS # BLD AUTO: 0.05 K/UL
BASOPHILS NFR BLD AUTO: 0.6 %
BILIRUB DIRECT SERPL-MCNC: 0.1 MG/DL
BILIRUB INDIRECT SERPL-MCNC: 0.2 MG/DL
BILIRUB SERPL-MCNC: 0.2 MG/DL
BUN SERPL-MCNC: 18 MG/DL
CALCIUM SERPL-MCNC: 9.6 MG/DL
CHLORIDE SERPL-SCNC: 95 MMOL/L
CHOLEST SERPL-MCNC: 152 MG/DL
CO2 SERPL-SCNC: 26 MMOL/L
CREAT SERPL-MCNC: 6.78 MG/DL
EOSINOPHIL # BLD AUTO: 0.27 K/UL
EOSINOPHIL NFR BLD AUTO: 3.1 %
ESTIMATED AVERAGE GLUCOSE: 80 MG/DL
GLUCOSE SERPL-MCNC: 85 MG/DL
HBA1C MFR BLD HPLC: 4.4 %
HCT VFR BLD CALC: 26.3 %
HDLC SERPL-MCNC: 49 MG/DL
HGB BLD-MCNC: 8.2 G/DL
IMM GRANULOCYTES NFR BLD AUTO: 0.5 %
LDLC SERPL CALC-MCNC: 89 MG/DL
LYMPHOCYTES # BLD AUTO: 1.93 K/UL
LYMPHOCYTES NFR BLD AUTO: 22.5 %
MAGNESIUM SERPL-MCNC: 2.1 MG/DL
MAN DIFF?: NORMAL
MCHC RBC-ENTMCNC: 29.8 PG
MCHC RBC-ENTMCNC: 31.2 GM/DL
MCV RBC AUTO: 95.6 FL
MONOCYTES # BLD AUTO: 1.12 K/UL
MONOCYTES NFR BLD AUTO: 13.1 %
NEUTROPHILS # BLD AUTO: 5.17 K/UL
NEUTROPHILS NFR BLD AUTO: 60.2 %
NONHDLC SERPL-MCNC: 103 MG/DL
OSMOLALITY SERPL: 287 MOSMOL/KG
PHOSPHATE SERPL-MCNC: 3 MG/DL
PLATELET # BLD AUTO: 189 K/UL
POTASSIUM SERPL-SCNC: 3.7 MMOL/L
PROT SERPL-MCNC: 7.7 G/DL
RBC # BLD: 2.75 M/UL
RBC # FLD: 16.3 %
SODIUM SERPL-SCNC: 136 MMOL/L
T3RU NFR SERPL: 0.9 TBI
T4 FREE SERPL-MCNC: 1.3 NG/DL
T4 SERPL-MCNC: 7.5 UG/DL
TRIGL SERPL-MCNC: 68 MG/DL
TSH SERPL-ACNC: 0.27 UIU/ML
URATE SERPL-MCNC: 2.9 MG/DL
WBC # FLD AUTO: 8.58 K/UL

## 2021-03-22 LAB — HEMOCCULT STL QL IA: NEGATIVE

## 2021-03-23 ENCOUNTER — OUTPATIENT (OUTPATIENT)
Dept: OUTPATIENT SERVICES | Facility: HOSPITAL | Age: 56
LOS: 1 days | End: 2021-03-23
Payer: MEDICAID

## 2021-03-23 ENCOUNTER — APPOINTMENT (OUTPATIENT)
Dept: CT IMAGING | Facility: IMAGING CENTER | Age: 56
End: 2021-03-23
Payer: MEDICAID

## 2021-03-23 DIAGNOSIS — I31.3 PERICARDIAL EFFUSION (NONINFLAMMATORY): ICD-10-CM

## 2021-03-23 DIAGNOSIS — I77.0 ARTERIOVENOUS FISTULA, ACQUIRED: Chronic | ICD-10-CM

## 2021-03-23 DIAGNOSIS — Z99.2 DEPENDENCE ON RENAL DIALYSIS: ICD-10-CM

## 2021-03-23 DIAGNOSIS — Z98.89 OTHER SPECIFIED POSTPROCEDURAL STATES: Chronic | ICD-10-CM

## 2021-03-23 DIAGNOSIS — Z00.8 ENCOUNTER FOR OTHER GENERAL EXAMINATION: ICD-10-CM

## 2021-03-23 PROCEDURE — 71250 CT THORAX DX C-: CPT | Mod: 26

## 2021-03-23 PROCEDURE — 71250 CT THORAX DX C-: CPT

## 2021-04-21 ENCOUNTER — APPOINTMENT (OUTPATIENT)
Dept: ENDOCRINOLOGY | Facility: CLINIC | Age: 56
End: 2021-04-21

## 2021-04-29 ENCOUNTER — APPOINTMENT (OUTPATIENT)
Dept: CARDIOLOGY | Facility: CLINIC | Age: 56
End: 2021-04-29
Payer: MEDICAID

## 2021-04-29 ENCOUNTER — APPOINTMENT (OUTPATIENT)
Dept: CARDIOLOGY | Facility: CLINIC | Age: 56
End: 2021-04-29

## 2021-04-29 VITALS
HEART RATE: 79 BPM | BODY MASS INDEX: 27.2 KG/M2 | SYSTOLIC BLOOD PRESSURE: 140 MMHG | WEIGHT: 190 LBS | HEIGHT: 70 IN | OXYGEN SATURATION: 99 % | TEMPERATURE: 97.6 F | DIASTOLIC BLOOD PRESSURE: 80 MMHG

## 2021-04-29 DIAGNOSIS — I31.3 PERICARDIAL EFFUSION (NONINFLAMMATORY): ICD-10-CM

## 2021-04-29 PROCEDURE — 99214 OFFICE O/P EST MOD 30 MIN: CPT

## 2021-05-07 LAB
ALBUMIN SERPL ELPH-MCNC: 3.8 G/DL
ALP BLD-CCNC: 110 U/L
ALT SERPL-CCNC: 9 U/L
ANION GAP SERPL CALC-SCNC: 13 MMOL/L
AST SERPL-CCNC: 15 U/L
BASOPHILS # BLD AUTO: 0.05 K/UL
BASOPHILS NFR BLD AUTO: 0.7 %
BILIRUB SERPL-MCNC: 0.3 MG/DL
BUN SERPL-MCNC: 28 MG/DL
CALCIUM SERPL-MCNC: 10.2 MG/DL
CHLORIDE SERPL-SCNC: 97 MMOL/L
CO2 SERPL-SCNC: 27 MMOL/L
CREAT SERPL-MCNC: 8.83 MG/DL
EOSINOPHIL # BLD AUTO: 0.25 K/UL
EOSINOPHIL NFR BLD AUTO: 3.6 %
FERRITIN SERPL-MCNC: 834 NG/ML
FOLATE SERPL-MCNC: 10.4 NG/ML
GLUCOSE SERPL-MCNC: 86 MG/DL
HCT VFR BLD CALC: 37.5 %
HGB A MFR BLD: 97.5 %
HGB A2 MFR BLD: 2.5 %
HGB BLD-MCNC: 11.6 G/DL
HGB FRACT BLD-IMP: NORMAL
IMM GRANULOCYTES NFR BLD AUTO: 0.3 %
IRON SATN MFR SERPL: 21 %
IRON SERPL-MCNC: 48 UG/DL
LDH SERPL-CCNC: 125 U/L
LYMPHOCYTES # BLD AUTO: 1.82 K/UL
LYMPHOCYTES NFR BLD AUTO: 26.1 %
MAN DIFF?: NORMAL
MCHC RBC-ENTMCNC: 28.6 PG
MCHC RBC-ENTMCNC: 30.9 GM/DL
MCV RBC AUTO: 92.6 FL
MONOCYTES # BLD AUTO: 0.89 K/UL
MONOCYTES NFR BLD AUTO: 12.8 %
NEUTROPHILS # BLD AUTO: 3.95 K/UL
NEUTROPHILS NFR BLD AUTO: 56.5 %
PLATELET # BLD AUTO: 169 K/UL
POTASSIUM SERPL-SCNC: 3.9 MMOL/L
PROT SERPL-MCNC: 7.5 G/DL
RBC # BLD: 3.99 M/UL
RBC # BLD: 4.05 M/UL
RBC # FLD: 18.8 %
RETICS # AUTO: 2.7 %
RETICS AGGREG/RBC NFR: 107.7 K/UL
SODIUM SERPL-SCNC: 137 MMOL/L
TIBC SERPL-MCNC: 227 UG/DL
UIBC SERPL-MCNC: 180 UG/DL
WBC # FLD AUTO: 6.98 K/UL

## 2021-05-17 ENCOUNTER — APPOINTMENT (OUTPATIENT)
Dept: ENDOCRINOLOGY | Facility: CLINIC | Age: 56
End: 2021-05-17

## 2021-05-18 ENCOUNTER — APPOINTMENT (OUTPATIENT)
Dept: CARDIOLOGY | Facility: CLINIC | Age: 56
End: 2021-05-18
Payer: MEDICAID

## 2021-05-18 PROCEDURE — 99072 ADDL SUPL MATRL&STAF TM PHE: CPT

## 2021-05-18 PROCEDURE — 93306 TTE W/DOPPLER COMPLETE: CPT

## 2021-05-28 NOTE — HISTORY OF PRESENT ILLNESS
[FreeTextEntry1] : Murray is a 56 y.o male with a PMHx of ESRD, HLD, HTN, Anemia, current smoker who returns for follow up. Was recently hospitalized for SOB and GIB. Received multiple blood transfusions. Did have colonoscopy performed with subsequent polypectomy x2. Did see GI Dr. Cha 3/15/2021.\par \par 3/11/2021 CBC RBC 2.75, Hgb 8.2, Hct 26.3, RDW 16.3, Cr 6.78. \par 3/16/2021 TTE LVEF 2D 56%, trivial pericardial effusion noted. \par 3/23/2021 CT Chest: Lung nodules noted. Thyroid nodules noted. Did not see endocrinology as of yet.\par \par ESRD, left AVF, HD MWF Followed by Dr. Avilez\par \par Denies chest pain, palpitations, diaphoresis, n/v/d/c/reflux, HA, dizziness, syncope, cough, wheezing, SOB, MORIN, fatigue, swelling, myalgia, arthralgia, fever, chills, infection, changes in medications, recent trauma, travel.

## 2021-05-28 NOTE — CARDIOLOGY SUMMARY
[de-identified] : 3/16/2021 TTE LVEF 2D 56%, Trivial pericardial effusion, mild mitral annular calcification, mild mitral valve regurgitation, ascending aorta is dilated measuring 3.9 cm. Severely enlarged left atrium. [de-identified] : 3/23/2021 CT Chest IMPRESSION:\par \par Trace pericardial effusion without calcification of the pericardium.\par \par Mediastinum/Vessels/Heart: Aorta and pulmonary arteries are normal in size. There is trace pericardial effusion without calcification of the pericardium. No lymphadenopathy. Thyroid gland demonstrates a 1.1 x 0.8 cm left thyroid lobe nodule.\par \par Lungs/Pleura/Airways: Bibasilar linear scarring and/or atelectasis is noted. Minimal mosaic attenuation and septal thickening is noted likely due to edema in this dialysis patient. No consolidations, edema, effusion or pneumothorax. Right upper lobe 1 mm nodule on series 3 image 174. 2 groundglass nodules in the right upper lobe measuring up to 5 mm and are indeterminate. Right basilar 6 mm nodule on series 3 image 364.

## 2021-05-28 NOTE — PHYSICAL EXAM
[General Appearance - Well Developed] : well developed [Normal Appearance] : normal appearance [Well Groomed] : well groomed [General Appearance - Well Nourished] : well nourished [No Deformities] : no deformities [General Appearance - In No Acute Distress] : no acute distress [Normal Oral Mucosa] : normal oral mucosa [No Oral Pallor] : no oral pallor [No Oral Cyanosis] : no oral cyanosis [Normal Jugular Venous A Waves Present] : normal jugular venous A waves present [Normal Jugular Venous V Waves Present] : normal jugular venous V waves present [No Jugular Venous Salcido A Waves] : no jugular venous salcido A waves [Respiration, Rhythm And Depth] : normal respiratory rhythm and effort [Auscultation Breath Sounds / Voice Sounds] : lungs were clear to auscultation bilaterally [Exaggerated Use Of Accessory Muscles For Inspiration] : no accessory muscle use [Abdomen Soft] : soft [Abdomen Tenderness] : non-tender [Abdomen Mass (___ Cm)] : no abdominal mass palpated [Abnormal Walk] : normal gait [Gait - Sufficient For Exercise Testing] : the gait was sufficient for exercise testing [Nail Clubbing] : no clubbing of the fingernails [Cyanosis, Localized] : no localized cyanosis [Petechial Hemorrhages (___cm)] : no petechial hemorrhages [Skin Color & Pigmentation] : normal skin color and pigmentation [] : no rash [No Venous Stasis] : no venous stasis [Skin Lesions] : no skin lesions [No Skin Ulcers] : no skin ulcer [No Xanthoma] : no  xanthoma was observed [Oriented To Time, Place, And Person] : oriented to person, place, and time [Affect] : the affect was normal [Mood] : the mood was normal [No Anxiety] : not feeling anxious [FreeTextEntry1] : Regular rate and rhythm, NL S1, S2, non-displaced PMI, chest non-tender; no rubs,heaves  or gallops a  Grade 2/6 systolic murmur noted at the LSB

## 2021-06-07 ENCOUNTER — APPOINTMENT (OUTPATIENT)
Dept: ENDOCRINOLOGY | Facility: CLINIC | Age: 56
End: 2021-06-07
Payer: MEDICAID

## 2021-06-07 VITALS
DIASTOLIC BLOOD PRESSURE: 80 MMHG | SYSTOLIC BLOOD PRESSURE: 158 MMHG | WEIGHT: 193 LBS | TEMPERATURE: 98.6 F | RESPIRATION RATE: 16 BRPM | OXYGEN SATURATION: 99 % | HEART RATE: 68 BPM | BODY MASS INDEX: 27.69 KG/M2

## 2021-06-07 PROCEDURE — 99244 OFF/OP CNSLTJ NEW/EST MOD 40: CPT

## 2021-06-07 PROCEDURE — 99214 OFFICE O/P EST MOD 30 MIN: CPT

## 2021-06-07 PROCEDURE — 76536 US EXAM OF HEAD AND NECK: CPT

## 2021-06-07 NOTE — HISTORY OF PRESENT ILLNESS
[FreeTextEntry1] : Mr. HAQ  is a 56 year old male  who presents for endocrine consultation. He presents via the kind courtesy of Dr. Aguiar.  . He  presents with regard to a history of  recently noted thyroid nodule.\par He was recently in the hospital with GI bleed that required 4 units of prbc.He does have ESRD and is on hemodialysis. .\par He was recently evaluated per Dr. Aguiar re pericardial effusion. Ct Chest did reveal, on 3/32/3021 a 1.1 x 0.8 cm left thyroid nodule. TSH from March 11 of this year returned at 0.27 with Free T4 at 1.3 and no Free T3 available\par Further hx includes that of hypertension, hyperlipidemia and anemia\par No FHx of thyroid nodules or thyroid disease.

## 2021-06-07 NOTE — PROCEDURE
[[employer] e 2008 model, 10-12 MHz frequencies] : multiple real time longitudinal and transverse images were obtained using a high resolution ultrasound with a linear transducer, [employer] e 2008 model, 10-12 MHz frequencies. All measurements will be reported as longitudinal x daiana-posterior x transverse. [Thyroid Nodule] : thyroid nodule [] : a heterogeneous parenchyma [Right Thyroid] : right [No] : does not have a halo [Eggshell calcification] : eggshell calcification [Isthmus] : isthmus there is a  [No calcifications] : no calcifications [Peripheral and scant  internal vascularity] : peripheral and scant internal vascularity [Left Thyroid] : left [Lower] : lower pole there is a  [Hypoechoic] : hypoechoic nodule [Round] : round in shape [Regular] : regular [No] : does not have a halo [Macrocalcifications] : macrocalcifications [Peripheral vascularity] : peripheral vascularity [FreeTextEntry1] : 3.75 x 2.47 x 2.59 [FreeTextEntry5] : 4.07 x 2.50 x 2.25 [FreeTextEntry2] : 0.62 [FreeTextEntry3] : 1.24 x 0.83 x 1.08

## 2021-06-07 NOTE — PROCEDURE
[[employer] e 2008 model, 10-12 MHz frequencies] : multiple real time longitudinal and transverse images were obtained using a high resolution ultrasound with a linear transducer, [employer] e 2008 model, 10-12 MHz frequencies. All measurements will be reported as longitudinal x daiana-posterior x transverse. [Thyroid Nodule] : thyroid nodule [] : a heterogeneous parenchyma [Right Thyroid] : right [No] : does not have a halo [Eggshell calcification] : eggshell calcification [Isthmus] : isthmus there is a  [No calcifications] : no calcifications [Peripheral and scant  internal vascularity] : peripheral and scant internal vascularity [Left Thyroid] : left [Lower] : lower pole there is a  [Hypoechoic] : hypoechoic nodule [Round] : round in shape [Regular] : regular [No] : does not have a halo [Macrocalcifications] : macrocalcifications [Peripheral vascularity] : peripheral vascularity [FreeTextEntry1] : 3.75 x 2.47 x 2.59 [FreeTextEntry5] : 4.07 x 2.50 x 2.25 [FreeTextEntry2] : 0.62 [FreeTextEntry3] : 1.24 x 0.83 x 1.08

## 2021-06-09 ENCOUNTER — APPOINTMENT (OUTPATIENT)
Dept: GASTROENTEROLOGY | Facility: CLINIC | Age: 56
End: 2021-06-09
Payer: MEDICAID

## 2021-06-09 ENCOUNTER — LABORATORY RESULT (OUTPATIENT)
Age: 56
End: 2021-06-09

## 2021-06-09 DIAGNOSIS — K92.2 GASTROINTESTINAL HEMORRHAGE, UNSPECIFIED: ICD-10-CM

## 2021-06-09 DIAGNOSIS — Z86.2 PERSONAL HISTORY OF DISEASES OF THE BLOOD AND BLOOD-FORMING ORGANS AND CERTAIN DISORDERS INVOLVING THE IMMUNE MECHANISM: ICD-10-CM

## 2021-06-09 PROCEDURE — 99214 OFFICE O/P EST MOD 30 MIN: CPT

## 2021-06-09 RX ORDER — FAMOTIDINE 20 MG/1
20 TABLET, FILM COATED ORAL
Qty: 90 | Refills: 1 | Status: ACTIVE | COMMUNITY

## 2021-06-09 NOTE — PHYSICAL EXAM
[General Appearance - Alert] : alert [General Appearance - In No Acute Distress] : in no acute distress [Sclera] : the sclera and conjunctiva were normal [PERRL With Normal Accommodation] : pupils were equal in size, round, and reactive to light [Extraocular Movements] : extraocular movements were intact [Outer Ear] : the ears and nose were normal in appearance [Oropharynx] : the oropharynx was normal [Neck Appearance] : the appearance of the neck was normal [Neck Cervical Mass (___cm)] : no neck mass was observed [Jugular Venous Distention Increased] : there was no jugular-venous distention [Thyroid Diffuse Enlargement] : the thyroid was not enlarged [Thyroid Nodule] : there were no palpable thyroid nodules [Auscultation Breath Sounds / Voice Sounds] : lungs were clear to auscultation bilaterally [Heart Rate And Rhythm] : heart rate was normal and rhythm regular [Heart Sounds] : normal S1 and S2 [Heart Sounds Gallop] : no gallops [Murmurs] : no murmurs [Heart Sounds Pericardial Friction Rub] : no pericardial rub [FreeTextEntry1] : AV shunt L arm [Bowel Sounds] : normal bowel sounds [Abdomen Soft] : soft [Abdomen Tenderness] : non-tender [] : no hepato-splenomegaly [Abdomen Mass (___ Cm)] : no abdominal mass palpated [No CVA Tenderness] : no ~M costovertebral angle tenderness [No Spinal Tenderness] : no spinal tenderness [Abnormal Walk] : normal gait [Nail Clubbing] : no clubbing  or cyanosis of the fingernails [Musculoskeletal - Swelling] : no joint swelling seen [Motor Tone] : muscle strength and tone were normal [Oriented To Time, Place, And Person] : oriented to person, place, and time [Impaired Insight] : insight and judgment were intact [Affect] : the affect was normal

## 2021-06-09 NOTE — ASSESSMENT
[FreeTextEntry1] : No evidence of GI bleeding at present.  He is asymptomatic from a GI standpoint.  He will continue famotidine 20 mg daily.\par His H&H was good.  He continues with dialysis 3 times a week.

## 2021-06-09 NOTE — HISTORY OF PRESENT ILLNESS
[FreeTextEntry1] : Patient is a 56-year-old gentleman who was hospitalized in February for anemia and GI blood loss that required 4 units of packed cells.  He underwent a colonoscopy that revealed 2 colon polyps that were removed these were tubular adenomas.  He also underwent an upper endoscopy and had an esophageal papilloma removed.  He had a small hiatus hernia but no other source for the bleeding.  He underwent a capsule endoscopy that also did not reveal any evidence of small bowel bleeding.\par He suffers from ESRD and is on dialysis.  His hemoglobin in the hospital was 4.8.  Most recent blood work revealed H/H 11.6/37.5, MCV 92.6, platelets 169k, WBC 6.98. iron/TIBC 48/227, 21% sat, Ferritn 834.\par His bowel movements are now regular without any melena or bleeding.  He has no abdominal pain and no heartburn. Takes Famotidine 20mg daily.

## 2021-09-30 ENCOUNTER — APPOINTMENT (OUTPATIENT)
Dept: CARDIOLOGY | Facility: CLINIC | Age: 56
End: 2021-09-30

## 2021-10-07 ENCOUNTER — APPOINTMENT (OUTPATIENT)
Dept: CARDIOLOGY | Facility: CLINIC | Age: 56
End: 2021-10-07
Payer: MEDICAID

## 2021-10-07 VITALS
SYSTOLIC BLOOD PRESSURE: 117 MMHG | WEIGHT: 193 LBS | OXYGEN SATURATION: 98 % | BODY MASS INDEX: 27.63 KG/M2 | HEIGHT: 70 IN | HEART RATE: 89 BPM | DIASTOLIC BLOOD PRESSURE: 71 MMHG | TEMPERATURE: 97.6 F

## 2021-10-07 DIAGNOSIS — Z01.810 ENCOUNTER FOR PREPROCEDURAL CARDIOVASCULAR EXAMINATION: ICD-10-CM

## 2021-10-07 PROCEDURE — 93000 ELECTROCARDIOGRAM COMPLETE: CPT

## 2021-10-07 PROCEDURE — 99214 OFFICE O/P EST MOD 30 MIN: CPT

## 2021-10-08 LAB
ALBUMIN SERPL ELPH-MCNC: 3.7 G/DL
ALP BLD-CCNC: 111 U/L
ALT SERPL-CCNC: 7 U/L
ANION GAP SERPL CALC-SCNC: 15 MMOL/L
AST SERPL-CCNC: 13 U/L
BASOPHILS # BLD AUTO: 0.05 K/UL
BASOPHILS NFR BLD AUTO: 0.7 %
BILIRUB DIRECT SERPL-MCNC: 0.1 MG/DL
BILIRUB INDIRECT SERPL-MCNC: 0.2 MG/DL
BILIRUB SERPL-MCNC: 0.3 MG/DL
BUN SERPL-MCNC: 61 MG/DL
CALCIUM SERPL-MCNC: 10.5 MG/DL
CHLORIDE SERPL-SCNC: 100 MMOL/L
CHOLEST SERPL-MCNC: 140 MG/DL
CK SERPL-CCNC: 151 U/L
CO2 SERPL-SCNC: 23 MMOL/L
COVID-19 SPIKE DOMAIN ANTIBODY INTERPRETATION: POSITIVE
CREAT SERPL-MCNC: 15.06 MG/DL
EOSINOPHIL # BLD AUTO: 0.26 K/UL
EOSINOPHIL NFR BLD AUTO: 3.5 %
ESTIMATED AVERAGE GLUCOSE: 85 MG/DL
GLUCOSE SERPL-MCNC: 79 MG/DL
HBA1C MFR BLD HPLC: 4.6 %
HCT VFR BLD CALC: 33.4 %
HDLC SERPL-MCNC: 40 MG/DL
HGB BLD-MCNC: 10.4 G/DL
IMM GRANULOCYTES NFR BLD AUTO: 0.4 %
LDLC SERPL CALC-MCNC: 85 MG/DL
LDLC SERPL DIRECT ASSAY-MCNC: 86 MG/DL
LYMPHOCYTES # BLD AUTO: 1.86 K/UL
LYMPHOCYTES NFR BLD AUTO: 24.9 %
MAN DIFF?: NORMAL
MCHC RBC-ENTMCNC: 30.5 PG
MCHC RBC-ENTMCNC: 31.1 GM/DL
MCV RBC AUTO: 97.9 FL
MONOCYTES # BLD AUTO: 0.72 K/UL
MONOCYTES NFR BLD AUTO: 9.7 %
NEUTROPHILS # BLD AUTO: 4.54 K/UL
NEUTROPHILS NFR BLD AUTO: 60.8 %
NONHDLC SERPL-MCNC: 100 MG/DL
PLATELET # BLD AUTO: 148 K/UL
POTASSIUM SERPL-SCNC: 4.7 MMOL/L
PROT SERPL-MCNC: 7.4 G/DL
RBC # BLD: 3.41 M/UL
RBC # FLD: 15.9 %
SARS-COV-2 AB SERPL IA-ACNC: 41.9 U/ML
SODIUM SERPL-SCNC: 139 MMOL/L
TRIGL SERPL-MCNC: 76 MG/DL
WBC # FLD AUTO: 7.46 K/UL

## 2021-10-10 NOTE — HISTORY OF PRESENT ILLNESS
[FreeTextEntry1] : I was asked to see this delightful patient today for Pre-op Evaluation  prior to kidney transplant  with   Dr. Solomon/[facility]  at fax number   _______  .  The patient denies fever, cough, wheezing, sputum production, hemoptysis, dyspnea, congestion, diarrhea, constipation, nausea, vomiting, bright red blood per rectum, melena, angina, chest pain, palpitations, diaphoresis, PND, incontinence, frequency, urgency, dysuria, edema, joint pain, headache, weakness, numbness and dizziness. The date of the planned procedure is: TBD\par \par Pt is requesting referral for CT ab/pelvis and CT chest in order to be cleared for surgery. \par  The patient presents for evaluation of high blood pressure. Patient is currently tolerating the current  antihypertensive regime and they deny headaches, stiff neck, visual changes, pedal Edema or PND. They also are here for follow-up of elevated cholesterol. Patient is currently tolerating medication and and does not complain of new  muscle pain, joint pain, back pain,urinary changes ,nausea, vomiting, abdominal pain or diarrhea. The patient is trying to follow a low cholesterol diet. \par The patient is also here for CKD  they are taking there BP meds  regularly and also staying away from  OTC  nephrotoxic  drugs such as NSAIDs and over-the-counter supplements as previously advised.\par  Pt states today that they had both covid 19 vaccine . pt had the COVID-19 vaccine without major side effects. The patient did experience mild fatigue headache and arm soreness. The patient denied any fever over 100.5. pt denies any recent sore throat, fever, chills loss of taste or smell. \par

## 2021-10-18 ENCOUNTER — APPOINTMENT (OUTPATIENT)
Dept: CARDIOLOGY | Facility: CLINIC | Age: 56
End: 2021-10-18
Payer: MEDICAID

## 2021-10-18 PROCEDURE — A9500: CPT

## 2021-10-18 PROCEDURE — 78452 HT MUSCLE IMAGE SPECT MULT: CPT

## 2021-10-18 PROCEDURE — 93015 CV STRESS TEST SUPVJ I&R: CPT

## 2021-10-18 RX ORDER — REGADENOSON 0.08 MG/ML
0.4 INJECTION, SOLUTION INTRAVENOUS
Qty: 1 | Refills: 0 | Status: COMPLETED | OUTPATIENT
Start: 2021-10-18

## 2021-10-18 RX ADMIN — REGADENOSON 5 MG/5ML: 0.08 INJECTION, SOLUTION INTRAVENOUS at 00:00

## 2021-10-20 ENCOUNTER — NON-APPOINTMENT (OUTPATIENT)
Age: 56
End: 2021-10-20

## 2021-12-08 ENCOUNTER — APPOINTMENT (OUTPATIENT)
Dept: ENDOCRINOLOGY | Facility: CLINIC | Age: 56
End: 2021-12-08
Payer: MEDICAID

## 2021-12-08 VITALS
TEMPERATURE: 97.9 F | HEART RATE: 95 BPM | BODY MASS INDEX: 27.53 KG/M2 | HEIGHT: 70 IN | OXYGEN SATURATION: 98 % | WEIGHT: 192.31 LBS | DIASTOLIC BLOOD PRESSURE: 83 MMHG | SYSTOLIC BLOOD PRESSURE: 142 MMHG

## 2021-12-08 PROCEDURE — 76536 US EXAM OF HEAD AND NECK: CPT

## 2021-12-08 PROCEDURE — 99214 OFFICE O/P EST MOD 30 MIN: CPT

## 2021-12-08 NOTE — PROCEDURE
[[employer] e 2008 model, 10-12 MHz frequencies] : multiple real time longitudinal and transverse images were obtained using a high resolution ultrasound with a linear transducer, [employer] e 2008 model, 10-12 MHz frequencies. All measurements will be reported as longitudinal x daiana-posterior x transverse. [Thyroid Nodule] : thyroid nodule [] : a heterogeneous parenchyma [No] : does not have a halo [No vascularity] : no vascularity [Right Thyroid] : right [Upper] : upper pole there is a  [Round] : round in shape [Peripheral vascularity] : peripheral vascularity [Isthmus] : isthmus there is a  [Isoechoic w/ hypoechoic foci] : isoechoic, with an internal hypoechoic foci nodule [No calcifications] : no calcifications [Left Thyroid] : left [Lower] : lower pole there is a  [Hypoechoic] : hypoechoic nodule [Ovoid] : ovoid in shape [Regular] : regular [No] : does not have a halo [Macrocalcifications] : macrocalcifications [Peripheral and scant  internal vascularity] : peripheral and scant internal vascularity [FreeTextEntry1] : 3.30 x 2.64 x 2.82 [FreeTextEntry5] : 3.53 x 2.70 x 2.41 [FreeTextEntry2] : 0.82 [FreeTextEntry3] : 1.28 x 0.86 x 1.29

## 2021-12-08 NOTE — PROCEDURE
[[employer] e 2008 model, 10-12 MHz frequencies] : multiple real time longitudinal and transverse images were obtained using a high resolution ultrasound with a linear transducer, [employer] e 2008 model, 10-12 MHz frequencies. All measurements will be reported as longitudinal x daiana-posterior x transverse. [Thyroid Nodule] : thyroid nodule [] : a heterogeneous parenchyma [No] : does not have a halo [No vascularity] : no vascularity [Right Thyroid] : right [Upper] : upper pole there is a  [Round] : round in shape [Peripheral vascularity] : peripheral vascularity [Isthmus] : isthmus there is a  [Isoechoic w/ hypoechoic foci] : isoechoic, with an internal hypoechoic foci nodule [No calcifications] : no calcifications [Left Thyroid] : left [Lower] : lower pole there is a  [Hypoechoic] : hypoechoic nodule [Ovoid] : ovoid in shape [Regular] : regular [No] : does not have a halo [Macrocalcifications] : macrocalcifications [Peripheral and scant  internal vascularity] : peripheral and scant internal vascularity [FreeTextEntry1] : 3.30 x 2.64 x 2.82 [FreeTextEntry5] : 3.53 x 2.70 x 2.41 [FreeTextEntry2] : 0.82 [FreeTextEntry3] : 1.28 x 0.86 x 1.29

## 2021-12-18 NOTE — HISTORY OF PRESENT ILLNESS
[FreeTextEntry1] : Mr. HAQ is a 56 year old male who returns today for endocrine follow up. He returns with regard to a history of recently noted thyroid nodule. \par \par He was recently hospitalized with GI bleed that required 4 units of prbc. He does have ESRD and is on hemodialysis. .\par He was evaluated per Dr. Aguiar re pericardial effusion. Ct Chest did reveal, on 3/32/3021 a 1.1 x 0.8 cm left thyroid nodule. TSH from March 11 of this year returned at 0.27 with Free T4 at 1.3 and no Free T3 available\par Further hx includes that of hypertension, hyperlipidemia and anemia\par No FHx of thyroid nodules or thyroid disease. \par Denies anterior neck discomfort, difficulty swallowing, or any change in the appearance of the neck region.\par He too denies c/p, sob, palpitations or tremors.\par

## 2021-12-22 ENCOUNTER — APPOINTMENT (OUTPATIENT)
Dept: GASTROENTEROLOGY | Facility: CLINIC | Age: 56
End: 2021-12-22

## 2022-01-18 ENCOUNTER — APPOINTMENT (OUTPATIENT)
Dept: CARDIOLOGY | Facility: CLINIC | Age: 57
End: 2022-01-18

## 2022-03-14 DIAGNOSIS — Z00.00 ENCOUNTER FOR GENERAL ADULT MEDICAL EXAMINATION W/OUT ABNORMAL FINDINGS: ICD-10-CM

## 2022-03-21 ENCOUNTER — NON-APPOINTMENT (OUTPATIENT)
Age: 57
End: 2022-03-21

## 2022-03-21 ENCOUNTER — APPOINTMENT (OUTPATIENT)
Dept: CARDIOLOGY | Facility: CLINIC | Age: 57
End: 2022-03-21
Payer: MEDICAID

## 2022-03-21 ENCOUNTER — LABORATORY RESULT (OUTPATIENT)
Age: 57
End: 2022-03-21

## 2022-03-21 VITALS
BODY MASS INDEX: 27.49 KG/M2 | HEART RATE: 83 BPM | DIASTOLIC BLOOD PRESSURE: 80 MMHG | OXYGEN SATURATION: 99 % | HEIGHT: 70 IN | WEIGHT: 192 LBS | SYSTOLIC BLOOD PRESSURE: 144 MMHG | TEMPERATURE: 97.9 F

## 2022-03-21 DIAGNOSIS — R89.9 UNSPECIFIED ABNORMAL FINDING IN SPECIMENS FROM OTHER ORGANS, SYSTEMS AND TISSUES: ICD-10-CM

## 2022-03-21 DIAGNOSIS — Z92.29 PERSONAL HISTORY OF OTHER DRUG THERAPY: ICD-10-CM

## 2022-03-21 DIAGNOSIS — H10.9 UNSPECIFIED CONJUNCTIVITIS: ICD-10-CM

## 2022-03-21 PROCEDURE — 99214 OFFICE O/P EST MOD 30 MIN: CPT

## 2022-03-21 PROCEDURE — 93000 ELECTROCARDIOGRAM COMPLETE: CPT

## 2022-03-22 ENCOUNTER — NON-APPOINTMENT (OUTPATIENT)
Age: 57
End: 2022-03-22

## 2022-03-22 ENCOUNTER — APPOINTMENT (OUTPATIENT)
Dept: OPHTHALMOLOGY | Facility: CLINIC | Age: 57
End: 2022-03-22
Payer: MEDICAID

## 2022-03-22 PROCEDURE — 92004 COMPRE OPH EXAM NEW PT 1/>: CPT

## 2022-03-22 NOTE — HISTORY OF PRESENT ILLNESS
[FreeTextEntry1] : The patient here for evaluation of high blood pressure. Patient is currently tolerating the current antihypertensive regime and they deny headaches, stiff neck, visual changes, or PND. The patient has been trying to stay on a low-sodium diet.  Patient is on hemodialysis for chronic kidney failure.\par Patient's blood pressure slightly elevated in the office today but he is predialysis today.\par The patient is here for follow-up of elevated cholesterol. Patient is currently tolerating medication and denies muscle pain, joint pain, back pain,  urinary changes , nausea, vomiting, abdominal pain or diarrhea. The patient is trying to follow a low cholesterol diet.\par Patient is complaining of excessive watery discharge from his eyes bilaterally for the last 2 weeks.\par The patient is also here for CKD  they are taking there BP meds  regularly and also staying away from  OTC  nephrotoxic  drugs such as NSAIDs and over-the-counter supplements as previously advised.\par \par Pt is here to follow up mild anemia reports no black stools, BRBPR or blood in urine. \par \par \par \par

## 2022-03-23 PROBLEM — R89.9 ABNORMAL LABORATORY TEST RESULT: Status: ACTIVE | Noted: 2022-03-23

## 2022-03-23 LAB
ALBUMIN SERPL ELPH-MCNC: 3.7 G/DL
ALP BLD-CCNC: 135 U/L
ALT SERPL-CCNC: 8 U/L
ANION GAP SERPL CALC-SCNC: 16 MMOL/L
AST SERPL-CCNC: 14 U/L
BASOPHILS # BLD AUTO: 0.06 K/UL
BASOPHILS NFR BLD AUTO: 0.7 %
BILIRUB DIRECT SERPL-MCNC: 0.1 MG/DL
BILIRUB INDIRECT SERPL-MCNC: 0.2 MG/DL
BILIRUB SERPL-MCNC: 0.3 MG/DL
BUN SERPL-MCNC: 71 MG/DL
CALCIUM SERPL-MCNC: 10.4 MG/DL
CHLORIDE SERPL-SCNC: 103 MMOL/L
CHOLEST SERPL-MCNC: 146 MG/DL
CK SERPL-CCNC: 134 U/L
CO2 SERPL-SCNC: 22 MMOL/L
COVID-19 NUCLEOCAPSID  GAM ANTIBODY INTERPRETATION: NEGATIVE
COVID-19 SPIKE DOMAIN ANTIBODY INTERPRETATION: POSITIVE
CREAT SERPL-MCNC: 15.7 MG/DL
EGFR: 3 ML/MIN/1.73M2
EOSINOPHIL # BLD AUTO: 0.37 K/UL
EOSINOPHIL NFR BLD AUTO: 4.2 %
ESTIMATED AVERAGE GLUCOSE: 85 MG/DL
GLUCOSE SERPL-MCNC: 90 MG/DL
HBA1C MFR BLD HPLC: 4.6 %
HCT VFR BLD CALC: 32 %
HDLC SERPL-MCNC: 40 MG/DL
HGB BLD-MCNC: 10 G/DL
IMM GRANULOCYTES NFR BLD AUTO: 0.2 %
LDLC SERPL CALC-MCNC: 91 MG/DL
LDLC SERPL DIRECT ASSAY-MCNC: 87 MG/DL
LYMPHOCYTES # BLD AUTO: 1.98 K/UL
LYMPHOCYTES NFR BLD AUTO: 22.5 %
MAN DIFF?: NORMAL
MCHC RBC-ENTMCNC: 30.1 PG
MCHC RBC-ENTMCNC: 31.3 GM/DL
MCV RBC AUTO: 96.4 FL
MONOCYTES # BLD AUTO: 0.99 K/UL
MONOCYTES NFR BLD AUTO: 11.3 %
NEUTROPHILS # BLD AUTO: 5.37 K/UL
NEUTROPHILS NFR BLD AUTO: 61.1 %
NONHDLC SERPL-MCNC: 106 MG/DL
PLATELET # BLD AUTO: NORMAL K/UL
POTASSIUM SERPL-SCNC: 4.7 MMOL/L
PROT SERPL-MCNC: 7.1 G/DL
RBC # BLD: 3.32 M/UL
RBC # FLD: 15.3 %
SARS-COV-2 AB SERPL IA-ACNC: >250 U/ML
SARS-COV-2 AB SERPL QL IA: 0.1 INDEX
SODIUM SERPL-SCNC: 141 MMOL/L
TRIGL SERPL-MCNC: 75 MG/DL
WBC # FLD AUTO: 8.79 K/UL

## 2022-07-22 NOTE — HISTORY OF PRESENT ILLNESS
[FreeTextEntry1] : The patient here for evaluation of high blood pressure. Patient is currently tolerating the current antihypertensive regime and they deny headaches, stiff neck, visual changes, or PND. The patient has been trying to stay on a low-sodium diet.  Patient is on hemodialysis for chronic kidney failure.\par \par The patient is here for follow-up of elevated cholesterol. Patient is currently tolerating medication and denies muscle pain, joint pain, back pain,  urinary changes , nausea, vomiting, abdominal pain or diarrhea. The patient is trying to follow a low cholesterol diet.\par The patient is also here for CKD  they are taking there BP meds  regularly and also staying away from  OTC  nephrotoxic  drugs such as NSAIDs and over-the-counter supplements as previously advised.\par \par Pt is here to follow up mild anemia reports no black stools, BRBPR or blood in urine. \par \par \par \par

## 2022-07-25 ENCOUNTER — APPOINTMENT (OUTPATIENT)
Dept: CARDIOLOGY | Facility: CLINIC | Age: 57
End: 2022-07-25

## 2022-08-22 ENCOUNTER — APPOINTMENT (OUTPATIENT)
Dept: VASCULAR SURGERY | Facility: CLINIC | Age: 57
End: 2022-08-22

## 2022-08-22 VITALS
BODY MASS INDEX: 27.49 KG/M2 | WEIGHT: 192 LBS | DIASTOLIC BLOOD PRESSURE: 81 MMHG | HEIGHT: 70 IN | HEART RATE: 83 BPM | SYSTOLIC BLOOD PRESSURE: 153 MMHG

## 2022-08-22 PROCEDURE — 99204 OFFICE O/P NEW MOD 45 MIN: CPT

## 2022-08-22 NOTE — ASSESSMENT
[FreeTextEntry1] : Patient with renal failure on dialysis.  Left brachiocephalic fistula with large aneurysms.  No active bleeding.\par \par Recommend duplex of the fistula to further evaluate as far as outflow stenosis.\par \par Patient will probably also need a fistulogram to rule out any central venous stenosis.\par \par Subsequent to that we will arrange for the aneurysm repair.\par \par This was all discussed with the patient in detail.

## 2022-08-22 NOTE — HISTORY OF PRESENT ILLNESS
[FreeTextEntry1] : Patient is a 57-year-old gentleman with past medical history significant for renal failure on hemodialysis for the past 6 years through a left brachiocephalic AV fistula.  Patient is here to see us for aneurysmal degeneration and enlarging aneurysms.\par \par Patient has no history of diabetes or coronary artery disease.\par \par Patient has had 2 prior procedures for repair of aneurysms in the past.\par \par Last procedure was done about 2 years ago.\par \par Patient has not had any other interventions since then.\par \par Patient is an active smoker.  No complaint of rest pain or claudication of lower extremities or upper extremities.  No history of CVA or TIAs.

## 2022-10-19 NOTE — DISCHARGE NOTE PROVIDER - CARE PROVIDERS DIRECT ADDRESSES
Pt awake, alert and comfortable at this time. Pt tolerated PO and ambulating without difficulty. Pt breathing easy, unlabored and appears to not to be in distress at this time. Vital signs stable at this time. Patient appropriate for discharge per ER MD at this time. Follow up instructions and signs and symptoms to look for to return to PED given to pt family. No questions at this time.    ,DirectAddress_Unknown,[email or web address],DirectAddress_Unknown,DirectAddress_Unknown ,DirectAddress_Unknown,DirectAddress_Unknown,DirectAddress_Unknown,[email or web address]

## 2022-10-31 ENCOUNTER — APPOINTMENT (OUTPATIENT)
Dept: VASCULAR SURGERY | Facility: CLINIC | Age: 57
End: 2022-10-31

## 2022-10-31 VITALS
BODY MASS INDEX: 27.49 KG/M2 | HEART RATE: 84 BPM | WEIGHT: 192 LBS | HEIGHT: 70 IN | DIASTOLIC BLOOD PRESSURE: 78 MMHG | SYSTOLIC BLOOD PRESSURE: 136 MMHG

## 2022-10-31 DIAGNOSIS — T82.858A STENOSIS OF OTHER VASCULAR PROSTHETIC, INITIAL ENCOUNTER: ICD-10-CM

## 2022-10-31 PROCEDURE — 99214 OFFICE O/P EST MOD 30 MIN: CPT

## 2022-10-31 PROCEDURE — 93990 DOPPLER FLOW TESTING: CPT

## 2022-10-31 NOTE — HISTORY OF PRESENT ILLNESS
[FreeTextEntry1] : 57-year-old male with a past medical history of end-stage renal disease on HD via left upper extremity brachiocephalic AV fistula presents for follow-up.  Patient denies any difficulty with dialysis but does report increase in size of the AV fistula.  Patient has had intervention in the past to reduce the size. [] : left brachiocephalic fistula

## 2022-10-31 NOTE — PHYSICAL EXAM
[Thrill] : thrill [Pulsatile Thrill] : pulsatile thrill [Aneurysm] : aneurysm [Bleeding] : no bleeding [Hand well perfused] : hand well perfused [Ulcer] : no ulcer [2+] : left 2+ [Normal] : coordination grossly intact, no focal deficits [de-identified] : Intact

## 2022-10-31 NOTE — DISCUSSION/SUMMARY
[FreeTextEntry1] : 57-year-old male with a past medical history of end-stage renal disease on HD via left upper extremity brachiocephalic AV fistula presents for follow-up.  \par \par Fistula duplex shows high end 50 to 75% stenosis of the distal upper arm with a 50 to 75% stenosis at the level of the clavicle and this is cephalic subclavian junction with a flow rate of 1954\par \par Given increase in size of AV fistula with evidence of stenosis we will plan for left upper extremity fistulogram to evaluate for any central venous stenosis

## 2022-11-17 ENCOUNTER — APPOINTMENT (OUTPATIENT)
Dept: CARDIOLOGY | Facility: CLINIC | Age: 57
End: 2022-11-17

## 2022-11-22 PROBLEM — N18.6 END STAGE KIDNEY DISEASE: Status: ACTIVE | Noted: 2022-11-22

## 2022-11-23 ENCOUNTER — APPOINTMENT (OUTPATIENT)
Dept: ENDOVASCULAR SURGERY | Facility: CLINIC | Age: 57
End: 2022-11-23

## 2022-11-23 DIAGNOSIS — N18.6 END STAGE RENAL DISEASE: ICD-10-CM

## 2022-12-08 ENCOUNTER — APPOINTMENT (OUTPATIENT)
Dept: ENDOCRINOLOGY | Facility: CLINIC | Age: 57
End: 2022-12-08
Payer: MEDICAID

## 2022-12-15 ENCOUNTER — LABORATORY RESULT (OUTPATIENT)
Age: 57
End: 2022-12-15

## 2022-12-20 ENCOUNTER — APPOINTMENT (OUTPATIENT)
Dept: ENDOVASCULAR SURGERY | Facility: CLINIC | Age: 57
End: 2022-12-20

## 2022-12-20 ENCOUNTER — RESULT REVIEW (OUTPATIENT)
Age: 57
End: 2022-12-20

## 2022-12-20 VITALS
TEMPERATURE: 98.5 F | DIASTOLIC BLOOD PRESSURE: 88 MMHG | BODY MASS INDEX: 25.77 KG/M2 | OXYGEN SATURATION: 100 % | HEIGHT: 70 IN | SYSTOLIC BLOOD PRESSURE: 172 MMHG | HEART RATE: 77 BPM | WEIGHT: 180 LBS | RESPIRATION RATE: 20 BRPM

## 2022-12-20 PROCEDURE — 37197Z: CUSTOM

## 2022-12-20 PROCEDURE — 36907Z: CUSTOM | Mod: 59

## 2022-12-20 PROCEDURE — 36902Z: CUSTOM | Mod: 59

## 2022-12-21 NOTE — PAST MEDICAL HISTORY
[FreeTextEntry1] : Malignant Hyperthermia Screening Tool and Risk of Bleeding Assessment \par DIANE HAQ  denies family of unexpected death following Anesthesia or Exercise.\par Denies family history of Malignant Hyperthermia, Muscle or Neuromuscular disorder and High Temperature following exercise. \par \par DIANE HAQ Patient denies history of Muscle Spasm, Dark or Chocolate- Colored and unanticipated fever immediately following anesthesia or serious exercise. \par \par DIANE HAQ Patient also denies bleeding tendencies/risks of bleeding.

## 2022-12-21 NOTE — HISTORY OF PRESENT ILLNESS
[FreeTextEntry1] : alert and orientated \par accompanied by friend\par covid test: not detected 12/15/22 \par covid vaccinated \par medications none today\par no reported falls [FreeTextEntry4] : yesterday  [FreeTextEntry5] : yesterday at 7pm [FreeTextEntry6] : Dr Haider

## 2022-12-21 NOTE — ASSESSMENT
[FreeTextEntry1] : 57-year-old male with a past medical history of end-stage renal disease on HD via left upper extremity brachiocephalic AV fistula.\par \par Fistula duplex shows high end 50 to 75% stenosis of the distal upper arm with a 50 to 75% stenosis at the level of the clavicle and this is cephalic subclavian junction with a flow rate of 1954\par \par Given increase in size of AV fistula with evidence of stenosis we will plan for left upper extremity fistulogram to evaluate for any central venous stenosis

## 2022-12-21 NOTE — BEHAVIORAL HEALTH ASSESSMENT NOTE - MOOD
Normal Island Pedicle Flap With Canthal Suspension Text: The defect edges were debeveled with a #15 scalpel blade.  Given the location of the defect, shape of the defect and the proximity to free margins an island pedicle advancement flap was deemed most appropriate.  Using a sterile surgical marker, an appropriate advancement flap was drawn incorporating the defect, outlining the appropriate donor tissue and placing the expected incisions within the relaxed skin tension lines where possible. The area thus outlined was incised deep to adipose tissue with a #15 scalpel blade.  The skin margins were undermined to an appropriate distance in all directions around the primary defect and laterally outward around the island pedicle utilizing iris scissors.  There was minimal undermining beneath the pedicle flap. A suspension suture was placed in the canthal tendon to prevent tension and prevent ectropion.

## 2023-01-06 ENCOUNTER — APPOINTMENT (OUTPATIENT)
Dept: ENDOCRINOLOGY | Facility: CLINIC | Age: 58
End: 2023-01-06
Payer: MEDICAID

## 2023-01-06 VITALS
HEIGHT: 70 IN | TEMPERATURE: 98.3 F | HEART RATE: 82 BPM | OXYGEN SATURATION: 99 % | DIASTOLIC BLOOD PRESSURE: 80 MMHG | WEIGHT: 177 LBS | BODY MASS INDEX: 25.34 KG/M2 | SYSTOLIC BLOOD PRESSURE: 150 MMHG

## 2023-01-06 DIAGNOSIS — E04.1 NONTOXIC SINGLE THYROID NODULE: ICD-10-CM

## 2023-01-06 PROCEDURE — 76536 US EXAM OF HEAD AND NECK: CPT

## 2023-01-06 PROCEDURE — 99214 OFFICE O/P EST MOD 30 MIN: CPT

## 2023-01-06 NOTE — PROCEDURE
[[employer] e 2008 model, 10-12 MHz frequencies] : multiple real time longitudinal and transverse images were obtained using a high resolution ultrasound with a linear transducer, [employer] e 2008 model, 10-12 MHz frequencies. All measurements will be reported as longitudinal x daiana-posterior x transverse. [Thyroid Nodule] : thyroid nodule [] : a heterogeneous parenchyma [Heterogeneous] : heterogenous nodule [No] : does not have a halo [Right Thyroid] : right [Thick disrupted] : has a thick disrupted halo [Isthmus] : isthmus there is a  [Mid] : mid pole there is a  [Mixed] : mixed [Hypoechoic] : hypoechoic nodule [Ovoid] : ovoid in shape [No calcifications] : no calcifications [Left Thyroid] : left [Lower] : lower pole there is a  [Isoechoic w/ hypoechoic foci] : isoechoic, with an internal hypoechoic foci nodule [Round] : round in shape [Regular] : regular [No] : does not have a halo [Macrocalcifications] : macrocalcifications [Peripheral vascularity] : peripheral vascularity [FreeTextEntry1] : 4.34 x 2.54 x 2.66 [FreeTextEntry5] : 3.49 x 2.26 x 2.55 [FreeTextEntry2] : 0.90 [FreeTextEntry3] : 1.31 x 0.70 x 0.82

## 2023-01-06 NOTE — PROCEDURE
[[employer] e 2008 model, 10-12 MHz frequencies] : multiple real time longitudinal and transverse images were obtained using a high resolution ultrasound with a linear transducer, [employer] e 2008 model, 10-12 MHz frequencies. All measurements will be reported as longitudinal x daiana-posterior x transverse. [Thyroid Nodule] : thyroid nodule [] : a heterogeneous parenchyma [Heterogeneous] : heterogenous nodule [No] : does not have a halo [Right Thyroid] : right [Thick disrupted] : has a thick disrupted halo [Isthmus] : isthmus there is a  [Mid] : mid pole there is a  [Mixed] : mixed [Hypoechoic] : hypoechoic nodule [Ovoid] : ovoid in shape [No calcifications] : no calcifications [Left Thyroid] : left [Lower] : lower pole there is a  [Isoechoic w/ hypoechoic foci] : isoechoic, with an internal hypoechoic foci nodule [Round] : round in shape [Regular] : regular [No] : does not have a halo [Macrocalcifications] : macrocalcifications [Peripheral vascularity] : peripheral vascularity [FreeTextEntry1] : 4.34 x 2.54 x 2.66 [FreeTextEntry5] : 3.49 x 2.26 x 2.55 [FreeTextEntry2] : 0.90 [FreeTextEntry3] : 1.31 x 0.70 x 0.82

## 2023-01-10 NOTE — HISTORY OF PRESENT ILLNESS
[FreeTextEntry1] : Mr. HAQ is a 56 year old male who returns today for endocrine follow up. He returns with regard to a history of thyroid nodularity\par \par  Last Thyroid US from 12/8/2021 demonstrated two nodules in the right lobe, one in isthmus and one in left lobe largest at 1.28 x 0.86 x 1.29 cm in left lower pole. ALl were  TR-3 and did not require intervention.\par \par Denies anterior neck discomfort, difficulty swallowing, or any change in the appearance of the neck region.\par TFT's to date have been wnl.\par \par Further hx includes that of hypertension, hyperlipidemia and anemia\par No FHx of thyroid nodules or thyroid disease. \par \par He denies c/p, sob, palpitations or tremors.\par not taking any vitamins at this time \par

## 2023-03-20 ENCOUNTER — APPOINTMENT (OUTPATIENT)
Dept: VASCULAR SURGERY | Facility: CLINIC | Age: 58
End: 2023-03-20

## 2023-03-27 ENCOUNTER — APPOINTMENT (OUTPATIENT)
Dept: VASCULAR SURGERY | Facility: CLINIC | Age: 58
End: 2023-03-27
Payer: MEDICAID

## 2023-03-27 PROCEDURE — 99213 OFFICE O/P EST LOW 20 MIN: CPT

## 2023-03-27 PROCEDURE — 93990 DOPPLER FLOW TESTING: CPT

## 2023-03-27 NOTE — HISTORY OF PRESENT ILLNESS
[] : left brachiocephalic fistula [FreeTextEntry1] : 58 year-old male with end-stage renal disease currently on hemodialysis via left upper extremity brachiocephalic AV fistula presents for follow-up.  Patient denies any difficulty with dialysis or excess bleeding.

## 2023-03-27 NOTE — PHYSICAL EXAM
[Thrill] : thrill [Aneurysm] : aneurysm [Hand well perfused] : hand well perfused [2+] : left 2+ [Normal] : coordination grossly intact, no focal deficits [Bleeding] : no bleeding [Ulcer] : no ulcer [de-identified] : Intact

## 2023-03-27 NOTE — ASSESSMENT
[FreeTextEntry1] : 58 year-old male with end-stage renal disease currently on hemodialysis via left upper extremity brachiocephalic. In the office today, patient underwent an AVF duplex which demonstrated a 50-75% stenosis at the cephalic vein junction with adequate flow rate of 2209.\par Given the patient is without complication at this time, will continue to monitor.\par Follow-up 3-4 months

## 2023-07-11 NOTE — PHYSICAL EXAM
Department of Anesthesiology  Postprocedure Note    Patient: Ozzie Gowers  MRN: [id number]  YOB: 1940  Date of evaluation: 7/11/2023      Procedure Summary     Date: 07/11/23 Room / Location: [facility] MAIN OR [address] [place] [address] MAIN OR    Anesthesia Start: 0937 Anesthesia Stop: 1143    Procedure: RIGHT PATELLAFEMORAL JOINT ARTHROPLASTY (Right: Knee) Diagnosis:       Primary osteoarthritis of right knee      (Primary osteoarthritis of right knee [M17.11])    Providers: Judy Watson MD Responsible Provider: Isaias Macedo MD    Anesthesia Type: Regional, Spinal ASA Status: 2          Anesthesia Type: Regional, Spinal    Tr Phase I: Tr Score: 9    Tr Phase II:        Anesthesia Post Evaluation    Patient location during evaluation: PACU  Patient participation: complete - patient participated  Level of consciousness: awake  Airway patency: patent  Nausea & Vomiting: no nausea  Complications: no  Cardiovascular status: blood pressure returned to baseline and hemodynamically stable  Respiratory status: acceptable  Hydration status: stable  Multimodal analgesia pain management approach [Alert] : alert [Well Nourished] : well nourished [No Acute Distress] : no acute distress [Well Developed] : well developed [Normal Sclera/Conjunctiva] : normal sclera/conjunctiva [EOMI] : extra ocular movement intact [No Proptosis] : no proptosis [Normal Oropharynx] : the oropharynx was normal [Thyroid Not Enlarged] : the thyroid was not enlarged [No Thyroid Nodules] : no palpable thyroid nodules [No Respiratory Distress] : no respiratory distress [No Accessory Muscle Use] : no accessory muscle use [Clear to Auscultation] : lungs were clear to auscultation bilaterally [Normal S1, S2] : normal S1 and S2 [Normal Rate] : heart rate was normal [Regular Rhythm] : with a regular rhythm [No Edema] : no peripheral edema [Pedal Pulses Normal] : the pedal pulses are present [Normal Bowel Sounds] : normal bowel sounds [Not Tender] : non-tender [Not Distended] : not distended [Soft] : abdomen soft [Normal Anterior Cervical Nodes] : no anterior cervical lymphadenopathy [Normal Posterior Cervical Nodes] : no posterior cervical lymphadenopathy [No Spinal Tenderness] : no spinal tenderness [Spine Straight] : spine straight [No Stigmata of Cushings Syndrome] : no stigmata of Cushings Syndrome [Normal Gait] : normal gait [Normal Strength/Tone] : muscle strength and tone were normal [No Rash] : no rash [Normal Reflexes] : deep tendon reflexes were 2+ and symmetric [No Tremors] : no tremors [Oriented x3] : oriented to person, place, and time [Acanthosis Nigricans] : no acanthosis nigricans

## 2023-07-17 ENCOUNTER — APPOINTMENT (OUTPATIENT)
Dept: VASCULAR SURGERY | Facility: CLINIC | Age: 58
End: 2023-07-17

## 2023-07-22 ENCOUNTER — INPATIENT (INPATIENT)
Facility: HOSPITAL | Age: 58
LOS: 0 days | Discharge: LEFT BEFORE DISCHARGE | End: 2023-07-22
Attending: INTERNAL MEDICINE | Admitting: INTERNAL MEDICINE
Payer: MEDICAID

## 2023-07-22 VITALS
RESPIRATION RATE: 24 BRPM | OXYGEN SATURATION: 95 % | SYSTOLIC BLOOD PRESSURE: 156 MMHG | TEMPERATURE: 98 F | DIASTOLIC BLOOD PRESSURE: 80 MMHG | HEART RATE: 86 BPM

## 2023-07-22 VITALS
SYSTOLIC BLOOD PRESSURE: 173 MMHG | HEART RATE: 96 BPM | TEMPERATURE: 98 F | RESPIRATION RATE: 20 BRPM | OXYGEN SATURATION: 96 % | DIASTOLIC BLOOD PRESSURE: 86 MMHG

## 2023-07-22 DIAGNOSIS — R06.02 SHORTNESS OF BREATH: ICD-10-CM

## 2023-07-22 DIAGNOSIS — Z98.89 OTHER SPECIFIED POSTPROCEDURAL STATES: Chronic | ICD-10-CM

## 2023-07-22 DIAGNOSIS — I77.0 ARTERIOVENOUS FISTULA, ACQUIRED: Chronic | ICD-10-CM

## 2023-07-22 LAB
ALBUMIN SERPL ELPH-MCNC: 3.5 G/DL — SIGNIFICANT CHANGE UP (ref 3.3–5)
ALP SERPL-CCNC: 118 U/L — SIGNIFICANT CHANGE UP (ref 40–120)
ALT FLD-CCNC: 6 U/L — SIGNIFICANT CHANGE UP (ref 4–41)
ANION GAP SERPL CALC-SCNC: 19 MMOL/L — HIGH (ref 7–14)
AST SERPL-CCNC: 9 U/L — SIGNIFICANT CHANGE UP (ref 4–40)
BASE EXCESS BLDV CALC-SCNC: -2.6 MMOL/L — LOW (ref -2–3)
BASOPHILS # BLD AUTO: 0.05 K/UL — SIGNIFICANT CHANGE UP (ref 0–0.2)
BASOPHILS NFR BLD AUTO: 0.6 % — SIGNIFICANT CHANGE UP (ref 0–2)
BILIRUB SERPL-MCNC: 0.5 MG/DL — SIGNIFICANT CHANGE UP (ref 0.2–1.2)
BUN SERPL-MCNC: 72 MG/DL — HIGH (ref 7–23)
CA-I SERPL-SCNC: 1.17 MMOL/L — SIGNIFICANT CHANGE UP (ref 1.15–1.33)
CALCIUM SERPL-MCNC: 9 MG/DL — SIGNIFICANT CHANGE UP (ref 8.4–10.5)
CHLORIDE BLDV-SCNC: 104 MMOL/L — SIGNIFICANT CHANGE UP (ref 96–108)
CHLORIDE SERPL-SCNC: 99 MMOL/L — SIGNIFICANT CHANGE UP (ref 98–107)
CO2 BLDV-SCNC: 22.6 MMOL/L — SIGNIFICANT CHANGE UP (ref 22–26)
CO2 SERPL-SCNC: 17 MMOL/L — LOW (ref 22–31)
CREAT SERPL-MCNC: 17.5 MG/DL — HIGH (ref 0.5–1.3)
DIALYSIS INSTRUMENT RESULT - HEPATITIS B SURFACE ANTIGEN: NEGATIVE — SIGNIFICANT CHANGE UP
EGFR: 3 ML/MIN/1.73M2 — LOW
EOSINOPHIL # BLD AUTO: 0.35 K/UL — SIGNIFICANT CHANGE UP (ref 0–0.5)
EOSINOPHIL NFR BLD AUTO: 3.9 % — SIGNIFICANT CHANGE UP (ref 0–6)
GAS PNL BLDV: 133 MMOL/L — LOW (ref 136–145)
GAS PNL BLDV: SIGNIFICANT CHANGE UP
GLUCOSE BLDV-MCNC: 84 MG/DL — SIGNIFICANT CHANGE UP (ref 70–99)
GLUCOSE SERPL-MCNC: 90 MG/DL — SIGNIFICANT CHANGE UP (ref 70–99)
HCO3 BLDV-SCNC: 22 MMOL/L — SIGNIFICANT CHANGE UP (ref 22–29)
HCT VFR BLD CALC: 25.6 % — LOW (ref 39–50)
HCT VFR BLDA CALC: 26 % — LOW (ref 39–51)
HGB BLD CALC-MCNC: 8.7 G/DL — LOW (ref 12.6–17.4)
HGB BLD-MCNC: 8.2 G/DL — LOW (ref 13–17)
IANC: 6.11 K/UL — SIGNIFICANT CHANGE UP (ref 1.8–7.4)
IMM GRANULOCYTES NFR BLD AUTO: 0.6 % — SIGNIFICANT CHANGE UP (ref 0–0.9)
LACTATE BLDV-MCNC: 0.7 MMOL/L — SIGNIFICANT CHANGE UP (ref 0.5–2)
LYMPHOCYTES # BLD AUTO: 1.3 K/UL — SIGNIFICANT CHANGE UP (ref 1–3.3)
LYMPHOCYTES # BLD AUTO: 14.5 % — SIGNIFICANT CHANGE UP (ref 13–44)
MAGNESIUM SERPL-MCNC: 2.2 MG/DL — SIGNIFICANT CHANGE UP (ref 1.6–2.6)
MCHC RBC-ENTMCNC: 28.6 PG — SIGNIFICANT CHANGE UP (ref 27–34)
MCHC RBC-ENTMCNC: 32 GM/DL — SIGNIFICANT CHANGE UP (ref 32–36)
MCV RBC AUTO: 89.2 FL — SIGNIFICANT CHANGE UP (ref 80–100)
MONOCYTES # BLD AUTO: 1.13 K/UL — HIGH (ref 0–0.9)
MONOCYTES NFR BLD AUTO: 12.6 % — SIGNIFICANT CHANGE UP (ref 2–14)
NEUTROPHILS # BLD AUTO: 6.11 K/UL — SIGNIFICANT CHANGE UP (ref 1.8–7.4)
NEUTROPHILS NFR BLD AUTO: 67.8 % — SIGNIFICANT CHANGE UP (ref 43–77)
NRBC # BLD: 0 /100 WBCS — SIGNIFICANT CHANGE UP (ref 0–0)
NRBC # FLD: 0 K/UL — SIGNIFICANT CHANGE UP (ref 0–0)
PCO2 BLDV: 34 MMHG — LOW (ref 42–55)
PH BLDV: 7.41 — SIGNIFICANT CHANGE UP (ref 7.32–7.43)
PHOSPHATE SERPL-MCNC: 3.2 MG/DL — SIGNIFICANT CHANGE UP (ref 2.5–4.5)
PLATELET # BLD AUTO: 143 K/UL — LOW (ref 150–400)
PO2 BLDV: 42 MMHG — SIGNIFICANT CHANGE UP (ref 25–45)
POTASSIUM BLDV-SCNC: 5.2 MMOL/L — HIGH (ref 3.5–5.1)
POTASSIUM SERPL-MCNC: 5 MMOL/L — SIGNIFICANT CHANGE UP (ref 3.5–5.3)
POTASSIUM SERPL-SCNC: 5 MMOL/L — SIGNIFICANT CHANGE UP (ref 3.5–5.3)
PROT SERPL-MCNC: 6.9 G/DL — SIGNIFICANT CHANGE UP (ref 6–8.3)
RBC # BLD: 2.87 M/UL — LOW (ref 4.2–5.8)
RBC # FLD: 17 % — HIGH (ref 10.3–14.5)
SAO2 % BLDV: 72.4 % — SIGNIFICANT CHANGE UP (ref 67–88)
SODIUM SERPL-SCNC: 135 MMOL/L — SIGNIFICANT CHANGE UP (ref 135–145)
TROPONIN T, HIGH SENSITIVITY RESULT: 89 NG/L — CRITICAL HIGH
WBC # BLD: 8.99 K/UL — SIGNIFICANT CHANGE UP (ref 3.8–10.5)
WBC # FLD AUTO: 8.99 K/UL — SIGNIFICANT CHANGE UP (ref 3.8–10.5)

## 2023-07-22 PROCEDURE — 71045 X-RAY EXAM CHEST 1 VIEW: CPT | Mod: 26

## 2023-07-22 PROCEDURE — 99291 CRITICAL CARE FIRST HOUR: CPT

## 2023-07-22 RX ORDER — LANOLIN ALCOHOL/MO/W.PET/CERES
3 CREAM (GRAM) TOPICAL AT BEDTIME
Refills: 0 | Status: DISCONTINUED | OUTPATIENT
Start: 2023-07-22 | End: 2023-07-22

## 2023-07-22 RX ORDER — CHLORHEXIDINE GLUCONATE 213 G/1000ML
1 SOLUTION TOPICAL DAILY
Refills: 0 | Status: DISCONTINUED | OUTPATIENT
Start: 2023-07-22 | End: 2023-07-22

## 2023-07-22 RX ORDER — ACETAMINOPHEN 500 MG
650 TABLET ORAL EVERY 6 HOURS
Refills: 0 | Status: DISCONTINUED | OUTPATIENT
Start: 2023-07-22 | End: 2023-07-22

## 2023-07-22 RX ORDER — SODIUM CHLORIDE 9 MG/ML
100 INJECTION INTRAMUSCULAR; INTRAVENOUS; SUBCUTANEOUS ONCE
Refills: 0 | Status: DISCONTINUED | OUTPATIENT
Start: 2023-07-22 | End: 2023-07-22

## 2023-07-22 RX ORDER — DEXTROSE 50 % IN WATER 50 %
25 SYRINGE (ML) INTRAVENOUS ONCE
Refills: 0 | Status: COMPLETED | OUTPATIENT
Start: 2023-07-22 | End: 2023-07-22

## 2023-07-22 RX ORDER — SODIUM ZIRCONIUM CYCLOSILICATE 10 G/10G
10 POWDER, FOR SUSPENSION ORAL ONCE
Refills: 0 | Status: COMPLETED | OUTPATIENT
Start: 2023-07-22 | End: 2023-07-22

## 2023-07-22 RX ORDER — ERYTHROPOIETIN 10000 [IU]/ML
20000 INJECTION, SOLUTION INTRAVENOUS; SUBCUTANEOUS
Refills: 0 | Status: DISCONTINUED | OUTPATIENT
Start: 2023-07-22 | End: 2023-07-23

## 2023-07-22 RX ORDER — INSULIN HUMAN 100 [IU]/ML
5 INJECTION, SOLUTION SUBCUTANEOUS ONCE
Refills: 0 | Status: COMPLETED | OUTPATIENT
Start: 2023-07-22 | End: 2023-07-22

## 2023-07-22 RX ADMIN — INSULIN HUMAN 5 UNIT(S): 100 INJECTION, SOLUTION SUBCUTANEOUS at 16:27

## 2023-07-22 RX ADMIN — Medication 25 GRAM(S): at 16:26

## 2023-07-22 RX ADMIN — SODIUM ZIRCONIUM CYCLOSILICATE 10 GRAM(S): 10 POWDER, FOR SUSPENSION ORAL at 16:28

## 2023-07-22 NOTE — ED PROVIDER NOTE - CLINICAL SUMMARY MEDICAL DECISION MAKING FREE TEXT BOX
58yoM with hx of ESRD on dialysis MWF presenting with SOB in the setting of missing the last two dialysis sessions. Initially hypoxic and placed on 5L o2 in triage but able to titrate down to RA in the ED room. 58yoM with hx of ESRD on dialysis MWF presenting with SOB in the setting of missing the last two dialysis sessions. Initially hypoxic and placed on 5L o2 in triage but able to titrate down to RA in the ED room. VS otherwise stable with rales on exam. RRR without m/r/g.     Clinical history most c/w fluid overload and pulmonary edema due to missed dialysis sessions. Consider electrolyte derangements and arrhythmia as contributing factor as well. DDx includes ACS, and infectious etiology such as PNA though less likely given acute onset w/o fever. Plan for electrolytes, troponin, and CXR. EKG suggestive of hyperkalemia so will treat with D5, 5u insulin, and lokelma. Anticipate patient will need admission for urgent dialysis so will contact his nephrologist.

## 2023-07-22 NOTE — ED ADULT TRIAGE NOTE - TEMPERATURE IN CELSIUS (DEGREES C)
Gabriel Hospital  Routed to Dr. Chirinos for approval    36.9

## 2023-07-22 NOTE — CONSULT NOTE ADULT - SUBJECTIVE AND OBJECTIVE BOX
[facility] Consult Note Nephrology - CONSULTATION NOTE     58-year-old male with a history of hypertension, hyperlipidemia, ESRD on M/W/F, last HD on Monday (6 days ago) here for shortness of breath since last night.  Patient states he missed dialysis on Wednesday and Friday.  He states he has problems with the people there.  He denies any fevers, chills.  He reports cough with mild phlegm production.  No chest pain, abdominal pain.  Patient does not make any urine.    renal consult for esrd on hd  hd at [facility]  last HD monday  + sob  denies any chest pain or n/v/d/c/    PAST MEDICAL & SURGICAL HISTORY:  Benign Essential Hypertension      Hypertension      GERD (gastroesophageal reflux disease)      ESRD on hemodialysis      Hyperlipidemia      Vitamin D deficiency      Tobacco use      HTN (hypertension)      HLD (hyperlipidemia)      History of open reduction and internal fixation (ORIF) procedure  Left Leg  1990  Hardware subsequently removed. Year unknown      AV fistula  Left upper arm 2/2006      No significant past surgical history        No Known Allergies    Home Medications Reviewed  Hospital Medications:   MEDICATIONS  (STANDING):  chlorhexidine 2% Cloths 1 Application(s) Topical daily  sodium chloride 0.9% Bolus 100 milliLiter(s) IV Bolus once    SOCIAL HISTORY:  Denies ETOh,Smoking,   FAMILY HISTORY:  Family history of chronic renal disease    Family history of lymphoma    Family history of hypertension in mother  HTN      REVIEW OF SYSTEMS:  CONSTITUTIONAL: No weakness, fevers or chills  EYES/ENT: No visual changes;  No vertigo or throat pain   NECK: No pain or stiffness  RESPIRATORY: +shortness of breath  CARDIOVASCULAR: No chest pain or palpitations.  GASTROINTESTINAL: No abdominal or epigastric pain. No nausea, vomiting, or hematemesis; No diarrhea or constipation. No melena or hematochezia.  GENITOURINARY: No dysuria, frequency, foamy urine, urinary urgency, incontinence or hematuria  NEUROLOGICAL: No numbness or weakness  SKIN: No itching, burning, rashes, or lesions   VASCULAR: No bilateral lower extremity edema.   All other review of systems is negative unless indicated above.    VITALS:  T(F): 98.5 (07-22-23 @ 15:23), Max: 98.5 (07-22-23 @ 15:23)  HR: 86 (07-22-23 @ 15:23)  BP: 156/80 (07-22-23 @ 15:23)  RR: 24 (07-22-23 @ 15:23)  SpO2: 95% (07-22-23 @ 15:23)  Wt(kg): --    Height (cm): 177.8 (07-22 @ 17:05)  Weight (kg): 81.6 (07-22 @ 17:05)  BMI (kg/m2): 25.8 (07-22 @ 17:05)  BSA (m2): 2 (07-22 @ 17:05)  PHYSICAL EXAM:  Constitutional: NAD  HEENT: anicteric sclera, oropharynx clear, MMM  Neck: No JVD  Respiratory: b/l rhonchi  Cardiovascular: S1, S2, RRR  Gastrointestinal: BS+, soft, NT/ND  Extremities: No cyanosis or clubbing. No peripheral edema  Neurological: A/O x 3, no focal deficits  Psychiatric: Normal mood, normal affect  : No CVA tenderness. No rubio.   Skin: No rashes  Vascular Access: left upper ext avf + thrill    LABS:  07-22    135  |  99  |  72<H>  ----------------------------<  90  5.0   |  17<L>  |  17.50<H>    Ca    9.0      22 Jul 2023 15:57  Phos  3.2     07-22  Mg     2.20     07-22    TPro  6.9  /  Alb  3.5  /  TBili  0.5  /  DBili      /  AST  9   /  ALT  6   /  AlkPhos  118  07-22    Creatinine Trend: 17.50 <--                        8.2    8.99  )-----------( 143      ( 22 Jul 2023 15:57 )             25.6     Urine Studies:  Urinalysis Basic - ( 22 Jul 2023 15:57 )    Color:  / Appearance:  / SG:  / pH:   Gluc: 90 mg/dL / Ketone:   / Bili:  / Urobili:    Blood:  / Protein:  / Nitrite:    Leuk Esterase:  / RBC:  / WBC    Sq Epi:  / Non Sq Epi:  / Bacteria:         RADIOLOGY & ADDITIONAL STUDIES:

## 2023-07-22 NOTE — ED ADULT NURSE REASSESSMENT NOTE - NS ED NURSE REASSESS COMMENT FT1
Report given to ANDRAE Cook, dialysis nurse. Patient stable, respirations even and unlabored, no signs/symptoms of acute distress present.

## 2023-07-22 NOTE — ED PROVIDER NOTE - ATTENDING CONTRIBUTION TO CARE
Patient is a 58-year-old male with a history of hypertension, hyperlipidemia, ESRD on M/W/F, last HD on Monday (6 days ago) here for shortness of breath since last night.  Patient states he missed dialysis on Wednesday and Friday.  He states he has problems with the people there.  He denies any fevers, chills.  He reports cough with mild phlegm production.  No chest pain, abdominal pain.  Patient does not make any urine.    VS noted  Gen. no acute distress, Non toxic   HEENT: EOMI, mmm  Lungs: crackles  CVS: RRR   Abd; Soft non tender, non distended   Ext: b/l LE edema  Skin: no rash  Neuro AAOx3 non focal clear speech  a/p:   Shortness of breath–missed HD, EKG reviewed and concerning for peaked T waves.  Plan for treatment for hyperkalemia, labs.  Patient will need nephrology consult for hemodialysis arrangement.  Will check chest x-ray.  - Ayan ZHENG

## 2023-07-22 NOTE — ED PROVIDER NOTE - PR
162 Keystone Flap Text: The defect edges were debeveled with a #15 scalpel blade.  Given the location of the defect, shape of the defect a keystone flap was deemed most appropriate.  Using a sterile surgical marker, an appropriate keystone flap was drawn incorporating the defect, outlining the appropriate donor tissue and placing the expected incisions within the relaxed skin tension lines where possible. The area thus outlined was incised deep to adipose tissue with a #15 scalpel blade.  The skin margins were undermined to an appropriate distance in all directions around the primary defect and laterally outward around the flap utilizing iris scissors.

## 2023-07-22 NOTE — ED ADULT NURSE NOTE - OBJECTIVE STATEMENT
Pt is A&OX4. Pt presents for SOB since last night. Pt has missed his dialysis appointments for the past week. Pt on NC-2L saturating at 96%. IV accesss obtained R AC 20G-labs drawn and sent. Pt with L arm precautions in place due to fistula. Medications administered as per orders. Pt denies chest pain, headache, ,abdominal pain, nausea, vomiting, dizziness. Pt states that he smokes 1 pack daily.

## 2023-07-22 NOTE — ED ADULT TRIAGE NOTE - CHIEF COMPLAINT QUOTE
Presents to ED from home for shortness of breath, states he missed 2 sessions of dialysis. Pt found to be hypoxic to 86% on RA, placed on 3 L NC now at 95%. History of ESRD via L AVF (M/W/F), smokes a pack a day.

## 2023-07-22 NOTE — ED PROVIDER NOTE - OBJECTIVE STATEMENT
59yo M with hx of ESRD on dialysis MWF presenting with SOB after missing the last two dialysis sessions. He denies any fevers, or chills, chest pain, abdominal pain, HAs, NVD. Does not have a specific reason for why he missed the last two sessions of dialysis. 57yo M with hx of ESRD on dialysis MWF presenting with SOB after missing the last two dialysis sessions. He denies any fevers, or chills, chest pain, abdominal pain, HAs, NVD. Does not have a specific reason for why he missed the last two sessions of dialysis. Has had a mild cough with some sputum production.

## 2023-07-22 NOTE — ED ADULT NURSE REASSESSMENT NOTE - NS ED NURSE REASSESS COMMENT FT1
Break RN: Patient sleeping and resting in stretcher; respirations even and unlabored, patient receiving oxygen at 2LPM via nasal cannula, no signs/symptoms of acute distress. Non-verbal indicators of pain absent, safety measures in place and call bell within reach.

## 2023-07-22 NOTE — CONSULT NOTE ADULT - ASSESSMENT
58-year-old male with a history of hypertension, hyperlipidemia, ESRD on M/W/F, last HD on Monday (6 days ago) here for shortness of breath      1) ESRD on HD-   HD [facility]  MWF  Access: left upper ext avf  last HD was monday  Consent in chart  Plan for HD today--> 2k bath and uf 2.5 kg as tolerated  trend bmp      2) Anemia in CKD  hgb below goal  epo 20k tiw with hd  trend hgb    Dr Corral  [phone]

## 2023-07-22 NOTE — ED PROVIDER NOTE - PHYSICAL EXAMINATION
Constitutional: Well nourished, well developed, appears stated age.   Eyes: PERRL, EOMI. No scleral icterus  HENT: Moist mucous membranes. No posterior oropharyngeal erythema.   Neck: Supple. No goiter. No C-spine TTP. No meningismus  CV: RRR, no m/r/g. Well perfused extremities.   RESP: On 5L O2 NC. Decreased to 0L in the room with no desaturations. Some rales on exam.   GI: Soft, non-tender, no massess appreciated  MSK: Moving all four extremities. No obvious deformity  Skin: Warm, dry, no rashes  Neuro: Alert and oriented. Normal strength and sensation of UEs and LEs  Psych: Appropriate mood and affect

## 2023-07-23 LAB
HBV CORE IGM SER-ACNC: SIGNIFICANT CHANGE UP
HBV SURFACE AB SER-ACNC: 70.4 MIU/ML — SIGNIFICANT CHANGE UP
HBV SURFACE AG SER-ACNC: SIGNIFICANT CHANGE UP
HCV AB S/CO SERPL IA: 0.11 S/CO — SIGNIFICANT CHANGE UP (ref 0–0.99)
HCV AB SERPL-IMP: SIGNIFICANT CHANGE UP
MRSA PCR RESULT.: SIGNIFICANT CHANGE UP
S AUREUS DNA NOSE QL NAA+PROBE: SIGNIFICANT CHANGE UP

## 2023-08-03 ENCOUNTER — INPATIENT (INPATIENT)
Facility: HOSPITAL | Age: 58
LOS: 0 days | Discharge: LEFT BEFORE DISCHARGE | DRG: 640 | End: 2023-08-03
Attending: HOSPITALIST | Admitting: HOSPITALIST
Payer: MEDICAID

## 2023-08-03 ENCOUNTER — TRANSCRIPTION ENCOUNTER (OUTPATIENT)
Age: 58
End: 2023-08-03

## 2023-08-03 VITALS
TEMPERATURE: 98 F | WEIGHT: 179.9 LBS | HEART RATE: 95 BPM | HEIGHT: 70 IN | OXYGEN SATURATION: 100 % | RESPIRATION RATE: 22 BRPM | SYSTOLIC BLOOD PRESSURE: 182 MMHG | DIASTOLIC BLOOD PRESSURE: 71 MMHG

## 2023-08-03 VITALS — OXYGEN SATURATION: 92 %

## 2023-08-03 DIAGNOSIS — N18.6 END STAGE RENAL DISEASE: ICD-10-CM

## 2023-08-03 DIAGNOSIS — I21.4 NON-ST ELEVATION (NSTEMI) MYOCARDIAL INFARCTION: ICD-10-CM

## 2023-08-03 DIAGNOSIS — Z29.9 ENCOUNTER FOR PROPHYLACTIC MEASURES, UNSPECIFIED: ICD-10-CM

## 2023-08-03 DIAGNOSIS — E78.5 HYPERLIPIDEMIA, UNSPECIFIED: ICD-10-CM

## 2023-08-03 DIAGNOSIS — R06.02 SHORTNESS OF BREATH: ICD-10-CM

## 2023-08-03 DIAGNOSIS — Z98.89 OTHER SPECIFIED POSTPROCEDURAL STATES: Chronic | ICD-10-CM

## 2023-08-03 DIAGNOSIS — I16.1 HYPERTENSIVE EMERGENCY: ICD-10-CM

## 2023-08-03 DIAGNOSIS — K21.9 GASTRO-ESOPHAGEAL REFLUX DISEASE WITHOUT ESOPHAGITIS: ICD-10-CM

## 2023-08-03 DIAGNOSIS — J96.01 ACUTE RESPIRATORY FAILURE WITH HYPOXIA: ICD-10-CM

## 2023-08-03 DIAGNOSIS — I77.0 ARTERIOVENOUS FISTULA, ACQUIRED: Chronic | ICD-10-CM

## 2023-08-03 LAB
ALBUMIN SERPL ELPH-MCNC: 3.5 G/DL — SIGNIFICANT CHANGE UP (ref 3.3–5)
ALP SERPL-CCNC: 107 U/L — SIGNIFICANT CHANGE UP (ref 40–120)
ALT FLD-CCNC: 5 U/L — LOW (ref 10–45)
ANION GAP SERPL CALC-SCNC: 19 MMOL/L — HIGH (ref 5–17)
AST SERPL-CCNC: 13 U/L — SIGNIFICANT CHANGE UP (ref 10–40)
BASE EXCESS BLDV CALC-SCNC: 0.9 MMOL/L — SIGNIFICANT CHANGE UP (ref -2–3)
BASOPHILS # BLD AUTO: 0.04 K/UL — SIGNIFICANT CHANGE UP (ref 0–0.2)
BASOPHILS NFR BLD AUTO: 0.4 % — SIGNIFICANT CHANGE UP (ref 0–2)
BILIRUB SERPL-MCNC: 0.4 MG/DL — SIGNIFICANT CHANGE UP (ref 0.2–1.2)
BUN SERPL-MCNC: 79 MG/DL — HIGH (ref 7–23)
CA-I SERPL-SCNC: 1.19 MMOL/L — SIGNIFICANT CHANGE UP (ref 1.15–1.33)
CALCIUM SERPL-MCNC: 9.3 MG/DL — SIGNIFICANT CHANGE UP (ref 8.4–10.5)
CHLORIDE BLDV-SCNC: 104 MMOL/L — SIGNIFICANT CHANGE UP (ref 96–108)
CHLORIDE SERPL-SCNC: 101 MMOL/L — SIGNIFICANT CHANGE UP (ref 96–108)
CO2 BLDV-SCNC: 26 MMOL/L — SIGNIFICANT CHANGE UP (ref 22–26)
CO2 SERPL-SCNC: 21 MMOL/L — LOW (ref 22–31)
CREAT SERPL-MCNC: 17.68 MG/DL — HIGH (ref 0.5–1.3)
EGFR: 3 ML/MIN/1.73M2 — LOW
EOSINOPHIL # BLD AUTO: 0.24 K/UL — SIGNIFICANT CHANGE UP (ref 0–0.5)
EOSINOPHIL NFR BLD AUTO: 2.2 % — SIGNIFICANT CHANGE UP (ref 0–6)
GAS PNL BLDV: 139 MMOL/L — SIGNIFICANT CHANGE UP (ref 136–145)
GAS PNL BLDV: SIGNIFICANT CHANGE UP
GLUCOSE BLDV-MCNC: 96 MG/DL — SIGNIFICANT CHANGE UP (ref 70–99)
GLUCOSE SERPL-MCNC: 102 MG/DL — HIGH (ref 70–99)
HCO3 BLDV-SCNC: 25 MMOL/L — SIGNIFICANT CHANGE UP (ref 22–29)
HCT VFR BLD CALC: 24.6 % — LOW (ref 39–50)
HCT VFR BLDA CALC: 26 % — LOW (ref 39–51)
HGB BLD CALC-MCNC: 8.7 G/DL — LOW (ref 12.6–17.4)
HGB BLD-MCNC: 7.5 G/DL — LOW (ref 13–17)
IMM GRANULOCYTES NFR BLD AUTO: 1 % — HIGH (ref 0–0.9)
LACTATE BLDV-MCNC: 0.9 MMOL/L — SIGNIFICANT CHANGE UP (ref 0.5–2)
LYMPHOCYTES # BLD AUTO: 1.29 K/UL — SIGNIFICANT CHANGE UP (ref 1–3.3)
LYMPHOCYTES # BLD AUTO: 12 % — LOW (ref 13–44)
MAGNESIUM SERPL-MCNC: 2.2 MG/DL — SIGNIFICANT CHANGE UP (ref 1.6–2.6)
MCHC RBC-ENTMCNC: 28.1 PG — SIGNIFICANT CHANGE UP (ref 27–34)
MCHC RBC-ENTMCNC: 30.5 GM/DL — LOW (ref 32–36)
MCV RBC AUTO: 92.1 FL — SIGNIFICANT CHANGE UP (ref 80–100)
MONOCYTES # BLD AUTO: 0.94 K/UL — HIGH (ref 0–0.9)
MONOCYTES NFR BLD AUTO: 8.7 % — SIGNIFICANT CHANGE UP (ref 2–14)
NEUTROPHILS # BLD AUTO: 8.13 K/UL — HIGH (ref 1.8–7.4)
NEUTROPHILS NFR BLD AUTO: 75.7 % — SIGNIFICANT CHANGE UP (ref 43–77)
NRBC # BLD: 0 /100 WBCS — SIGNIFICANT CHANGE UP (ref 0–0)
NT-PROBNP SERPL-SCNC: HIGH PG/ML (ref 0–300)
PCO2 BLDV: 38 MMHG — LOW (ref 42–55)
PH BLDV: 7.43 — SIGNIFICANT CHANGE UP (ref 7.32–7.43)
PHOSPHATE SERPL-MCNC: 3 MG/DL — SIGNIFICANT CHANGE UP (ref 2.5–4.5)
PLATELET # BLD AUTO: 202 K/UL — SIGNIFICANT CHANGE UP (ref 150–400)
PO2 BLDV: 37 MMHG — SIGNIFICANT CHANGE UP (ref 25–45)
POTASSIUM BLDV-SCNC: 4.7 MMOL/L — SIGNIFICANT CHANGE UP (ref 3.5–5.1)
POTASSIUM SERPL-MCNC: 5 MMOL/L — SIGNIFICANT CHANGE UP (ref 3.5–5.3)
POTASSIUM SERPL-SCNC: 5 MMOL/L — SIGNIFICANT CHANGE UP (ref 3.5–5.3)
PROT SERPL-MCNC: 7.1 G/DL — SIGNIFICANT CHANGE UP (ref 6–8.3)
RAPID RVP RESULT: SIGNIFICANT CHANGE UP
RBC # BLD: 2.67 M/UL — LOW (ref 4.2–5.8)
RBC # FLD: 17.9 % — HIGH (ref 10.3–14.5)
SAO2 % BLDV: 64.6 % — LOW (ref 67–88)
SARS-COV-2 RNA SPEC QL NAA+PROBE: SIGNIFICANT CHANGE UP
SODIUM SERPL-SCNC: 141 MMOL/L — SIGNIFICANT CHANGE UP (ref 135–145)
TROPONIN T, HIGH SENSITIVITY RESULT: 86 NG/L — HIGH (ref 0–51)
TROPONIN T, HIGH SENSITIVITY RESULT: 87 NG/L — HIGH (ref 0–51)
WBC # BLD: 10.75 K/UL — HIGH (ref 3.8–10.5)
WBC # FLD AUTO: 10.75 K/UL — HIGH (ref 3.8–10.5)

## 2023-08-03 PROCEDURE — 85025 COMPLETE CBC W/AUTO DIFF WBC: CPT

## 2023-08-03 PROCEDURE — 84132 ASSAY OF SERUM POTASSIUM: CPT

## 2023-08-03 PROCEDURE — 71045 X-RAY EXAM CHEST 1 VIEW: CPT | Mod: 26

## 2023-08-03 PROCEDURE — 80048 BASIC METABOLIC PNL TOTAL CA: CPT

## 2023-08-03 PROCEDURE — 82435 ASSAY OF BLOOD CHLORIDE: CPT

## 2023-08-03 PROCEDURE — 99285 EMERGENCY DEPT VISIT HI MDM: CPT

## 2023-08-03 PROCEDURE — G0378: CPT

## 2023-08-03 PROCEDURE — 84484 ASSAY OF TROPONIN QUANT: CPT

## 2023-08-03 PROCEDURE — 94660 CPAP INITIATION&MGMT: CPT

## 2023-08-03 PROCEDURE — 83880 ASSAY OF NATRIURETIC PEPTIDE: CPT

## 2023-08-03 PROCEDURE — 99254 IP/OBS CNSLTJ NEW/EST MOD 60: CPT

## 2023-08-03 PROCEDURE — 99233 SBSQ HOSP IP/OBS HIGH 50: CPT

## 2023-08-03 PROCEDURE — 82803 BLOOD GASES ANY COMBINATION: CPT

## 2023-08-03 PROCEDURE — 85018 HEMOGLOBIN: CPT

## 2023-08-03 PROCEDURE — 80053 COMPREHEN METABOLIC PANEL: CPT

## 2023-08-03 PROCEDURE — 85014 HEMATOCRIT: CPT

## 2023-08-03 PROCEDURE — 99223 1ST HOSP IP/OBS HIGH 75: CPT

## 2023-08-03 PROCEDURE — 71045 X-RAY EXAM CHEST 1 VIEW: CPT

## 2023-08-03 PROCEDURE — 84100 ASSAY OF PHOSPHORUS: CPT

## 2023-08-03 PROCEDURE — 83735 ASSAY OF MAGNESIUM: CPT

## 2023-08-03 PROCEDURE — 84295 ASSAY OF SERUM SODIUM: CPT

## 2023-08-03 PROCEDURE — 83605 ASSAY OF LACTIC ACID: CPT

## 2023-08-03 PROCEDURE — 0225U NFCT DS DNA&RNA 21 SARSCOV2: CPT

## 2023-08-03 PROCEDURE — 82947 ASSAY GLUCOSE BLOOD QUANT: CPT

## 2023-08-03 PROCEDURE — 82330 ASSAY OF CALCIUM: CPT

## 2023-08-03 PROCEDURE — 99261: CPT

## 2023-08-03 RX ORDER — ERYTHROPOIETIN 10000 [IU]/ML
20000 INJECTION, SOLUTION INTRAVENOUS; SUBCUTANEOUS
Refills: 0 | Status: DISCONTINUED | OUTPATIENT
Start: 2023-08-03 | End: 2023-08-03

## 2023-08-03 RX ORDER — HYDRALAZINE HCL 50 MG
100 TABLET ORAL ONCE
Refills: 0 | Status: COMPLETED | OUTPATIENT
Start: 2023-08-03 | End: 2023-08-03

## 2023-08-03 RX ORDER — LABETALOL HCL 100 MG
200 TABLET ORAL ONCE
Refills: 0 | Status: COMPLETED | OUTPATIENT
Start: 2023-08-03 | End: 2023-08-03

## 2023-08-03 RX ORDER — HEPARIN SODIUM 5000 [USP'U]/ML
5000 INJECTION INTRAVENOUS; SUBCUTANEOUS EVERY 12 HOURS
Refills: 0 | Status: DISCONTINUED | OUTPATIENT
Start: 2023-08-03 | End: 2023-08-03

## 2023-08-03 RX ADMIN — Medication 200 MILLIGRAM(S): at 06:32

## 2023-08-03 RX ADMIN — Medication 100 MILLIGRAM(S): at 06:32

## 2023-08-03 NOTE — ED ADULT NURSE REASSESSMENT NOTE - NS ED NURSE REASSESS COMMENT FT1
Patient reported wanting to eat while on Continuous BiPAP. ANGELA Avilez made aware. Per MD instruction respiratory should be called to titrate patient off of the BiPAP and transition to nasal cannula if tolerated.

## 2023-08-03 NOTE — ED ADULT NURSE NOTE - ED STAT RN HANDOFF DETAILS 4
Report taken from L[facility] ABEBE. [facility] D[facility] RN reported the patient refused High Flow and is now being transferred to ED on NC 4L with an O2 SAT of 94% and breathing comfortable per RN. @ Dialysis RN reported 2.9L of fluid removed with procedure. PT ate lunch @ Dialysis per RN.

## 2023-08-03 NOTE — DISCHARGE NOTE PROVIDER - NSDCMRMEDTOKEN_GEN_ALL_CORE_FT
epoetin ye: 29118 unit(s) hemodialysis 3 times a week   famotidine 20 mg oral tablet: 1 tab(s) orally once a day  gabapentin 300 mg oral tablet: 1 tab(s) orally once a day  hydrALAZINE 100 mg oral tablet: 1 tab(s) orally 3 times a day  labetalol 200 mg oral tablet: 1 tab(s) orally 2 times a day  melatonin 5 mg oral tablet: 1 tab(s) orally once a day (at bedtime)  Nicoderm C-Q Clear 21 mg/24 hr transdermal film, extended release: 21 milligram(s) transdermal once a day   pantoprazole 40 mg oral delayed release tablet: 1 tab(s) orally once a day (before a meal)  Renagel 800 mg oral tablet: 2 tab(s) orally 3 times a day  simvastatin 20 mg oral tablet: 1 tab(s) orally once a day (at bedtime)

## 2023-08-03 NOTE — CONSULT NOTE ADULT - SUBJECTIVE AND OBJECTIVE BOX
[facility] Consult Note Nephrology - CONSULTATION NOTE    58y Male with PMH of HTN, HLD, ESRD on MWF, GERD presents with shortness of breathe for 2 days. Patient missed HD session on monday because he did not want to be stuck by needle. At the time of the interview, patient is on biPAP, restless, requesting to signout AMA if does not go for HD soon. Patient was educated the importance of HD and how it may help with his breathing. Spoke with Dr. Corral from [facility] who had put in HD orders. Patient This is his second admission in last 2 weeks with similar complaints due to missed HD. Limited HPI obtained due to patients critical condition    Renal consult for esrd on hd  non compliant with hd  had sob   denies any chest pain or n or v      PAST MEDICAL & SURGICAL HISTORY:  Benign Essential Hypertension      Hypertension      GERD (gastroesophageal reflux disease)      ESRD on hemodialysis      Hyperlipidemia      Vitamin D deficiency      Tobacco use      HTN (hypertension)      HLD (hyperlipidemia)      History of open reduction and internal fixation (ORIF) procedure  Left Leg  1990  Hardware subsequently removed. Year unknown      AV fistula  Left upper arm 2/2006      No significant past surgical history        No Known Allergies    Home Medications Reviewed  Hospital Medications:   MEDICATIONS  (STANDING):  heparin   Injectable 5000 Unit(s) SubCutaneous every 12 hours    SOCIAL HISTORY:  Denies ETOh,Smoking,   FAMILY HISTORY:  Family history of chronic renal disease    Family history of lymphoma    Family history of hypertension in mother  HTN      REVIEW OF SYSTEMS:  CONSTITUTIONAL: +Weakness  EYES/ENT: No visual changes;  No vertigo or throat pain   NECK: No pain or stiffness  RESPIRATORY: ++ shortness of breath  CARDIOVASCULAR: No chest pain or palpitations.  GASTROINTESTINAL: No abdominal or epigastric pain. No nausea, vomiting, or hematemesis; No diarrhea or constipation. No melena or hematochezia.  GENITOURINARY: No dysuria, frequency, foamy urine, urinary urgency, incontinence or hematuria  NEUROLOGICAL: No numbness or weakness  SKIN: No itching, burning, rashes, or lesions   VASCULAR: No bilateral lower extremity edema.   All other review of systems is negative unless indicated above.    VITALS:  T(F): 97.7 (08-03-23 @ 13:12), Max: 98.4 (08-03-23 @ 03:50)  HR: 82 (08-03-23 @ 13:12)  BP: 165/88 (08-03-23 @ 13:12)  RR: 17 (08-03-23 @ 13:12)  SpO2: 100% (08-03-23 @ 13:12)  Wt(kg): --    Height (cm): 177.8 (08-03 @ 03:32)  Weight (kg): 81.6 (08-03 @ 03:32)  BMI (kg/m2): 25.8 (08-03 @ 03:32)  BSA (m2): 2 (08-03 @ 03:32)  PHYSICAL EXAM:  Constitutional: NAD  HEENT: anicteric sclera, oropharynx clear, MMM  Neck: No JVD  Respiratory: b/lrhonchi  Cardiovascular: S1, S2, RRR  Gastrointestinal: BS+, soft, NT/ND  Extremities: + peripheral edema  Neurological: A/O x 3, no focal deficits  Psychiatric: Normal mood, normal affect  : No CVA tenderness. No rubio.   Skin: No rashes      LABS:  08-03    141  |  101  |  80<H>  ----------------------------<  90  5.0   |  20<L>  |  18.17<H>    Ca    9.3      03 Aug 2023 05:43  Phos  3.0     08-03  Mg     2.2     08-03    TPro  7.1  /  Alb  3.5  /  TBili  0.4  /  DBili      /  AST  13  /  ALT  5<L>  /  AlkPhos  107  08-03    Creatinine Trend: 18.17 <--, 17.68 <--                        7.5    10.75 )-----------( 202      ( 03 Aug 2023 04:07 )             24.6     Urine Studies:  Urinalysis Basic - ( 03 Aug 2023 05:43 )    Color:  / Appearance:  / SG:  / pH:   Gluc: 90 mg/dL / Ketone:   / Bili:  / Urobili:    Blood:  / Protein:  / Nitrite:    Leuk Esterase:  / RBC:  / WBC    Sq Epi:  / Non Sq Epi:  / Bacteria:         RADIOLOGY & ADDITIONAL STUDIES:

## 2023-08-03 NOTE — CONSULT NOTE ADULT - SUBJECTIVE AND OBJECTIVE BOX
CHIEF COMPLAINT: SOB    HPI: Patient is a 59 yo M w/ HTN, HLD, ESRD (M/W/F), GERD who p/w SOB x 2 days. As per patient and chart review, he missed his HD session on monday. Patient was found to be hypoxemic with increased WOB and was initiated on BIPAP 10/5 40%. Patient also hypertensive to SBP 180s, received Hydralazine 100mg PO x1 and Labetalol 200mg PO x1. Of note, patient had presented to ED on 7/22 for similar presentation in setting of missed HD but had eloped from hospital.     Labs notable for mild leukocytosisi WBC 10.7, Hb 7.5, K 5, Bicarb 20, Creatinine 18.1, Trop 87 then 86 on repeat, BNP 24487. VBG 7.43/38/37. RVP negative. CXR from previous admission 7/22 with dense bilateral airspace opacities. CXR from this admission still with bilateral hazy opacities, but decreased from prior.       PAST MEDICAL & SURGICAL HISTORY:  Benign Essential Hypertension      Hypertension      GERD (gastroesophageal reflux disease)      ESRD on hemodialysis      Hyperlipidemia      Vitamin D deficiency      Tobacco use      HTN (hypertension)      HLD (hyperlipidemia)      History of open reduction and internal fixation (ORIF) procedure  Left Leg  1990  Hardware subsequently removed. Year unknown      AV fistula  Left upper arm 2/2006      No significant past surgical history          FAMILY HISTORY:  Family history of chronic renal disease    Family history of lymphoma    Family history of hypertension in mother  HTN        SOCIAL HISTORY:  Smoking: [ ] Never Smoked [ ] Former Smoker (__ packs x ___ years) [ ] Current Smoker  (__ packs x ___ years)  Substance Use: [ ] Never Used [ ] Used ____  EtOH Use:  Marital Status: [ ] Single [ ]  [ ]  [ ]   Sexual History:   Occupation:  Recent Travel:  Country of Birth:  Advance Directives:    Allergies    No Known Allergies    Intolerances        HOME MEDICATIONS:  Home Medications:  epoetin ye: 17076 unit(s) hemodialysis 3 times a week  (03 Aug 2023 12:55)  famotidine 20 mg oral tablet: 1 tab(s) orally once a day (03 Aug 2023 12:55)  gabapentin 300 mg oral tablet: 1 tab(s) orally once a day (03 Aug 2023 12:55)  hydrALAZINE 100 mg oral tablet: 1 tab(s) orally 3 times a day (03 Aug 2023 12:55)  labetalol 200 mg oral tablet: 1 tab(s) orally 2 times a day (03 Aug 2023 12:55)  Renagel 800 mg oral tablet: 2 tab(s) orally 3 times a day (03 Aug 2023 12:55)  simvastatin 20 mg oral tablet: 1 tab(s) orally once a day (at bedtime) (03 Aug 2023 12:55)      REVIEW OF SYSTEMS:  Constitutional: [ ] negative [ ] fevers [ ] chills [ ] weight loss [ ] weight gain  HEENT: [ ] negative [ ] dry eyes [ ] eye irritation [ ] postnasal drip [ ] nasal congestion  CV: [ ] negative  [ ] chest pain [ ] orthopnea [ ] palpitations [ ] murmur  Resp: [ ] negative [ ] cough [ ] shortness of breath [ ] dyspnea [ ] wheezing [ ] sputum [ ] hemoptysis  GI: [ ] negative [ ] nausea [ ] vomiting [ ] diarrhea [ ] constipation [ ] abd pain [ ] dysphagia   : [ ] negative [ ] dysuria [ ] nocturia [ ] hematuria [ ] increased urinary frequency  Musculoskeletal: [ ] negative [ ] back pain [ ] myalgias [ ] arthralgias [ ] fracture  Skin: [ ] negative [ ] rash [ ] itch  Neurological: [ ] negative [ ] headache [ ] dizziness [ ] syncope [ ] weakness [ ] numbness  Psychiatric: [ ] negative [ ] anxiety [ ] depression  Endocrine: [ ] negative [ ] diabetes [ ] thyroid problem  Hematologic/Lymphatic: [ ] negative [ ] anemia [ ] bleeding problem  Allergic/Immunologic: [ ] negative [ ] itchy eyes [ ] nasal discharge [ ] hives [ ] angioedema  [ ] All other systems negative  [ ] Unable to assess ROS because ________    OBJECTIVE:  ICU Vital Signs Last 24 Hrs  T(C): 36.5 (03 Aug 2023 13:12), Max: 36.9 (03 Aug 2023 03:50)  T(F): 97.7 (03 Aug 2023 13:12), Max: 98.4 (03 Aug 2023 03:50)  HR: 82 (03 Aug 2023 13:12) (82 - 95)  BP: 165/88 (03 Aug 2023 13:12) (164/81 - 189/75)  BP(mean): 98 (03 Aug 2023 12:15) (98 - 108)  ABP: --  ABP(mean): --  RR: 17 (03 Aug 2023 13:12) (16 - 22)  SpO2: 100% (03 Aug 2023 13:12) (95% - 100%)    O2 Parameters below as of 03 Aug 2023 13:12  Patient On (Oxygen Delivery Method): BiPAP/CPAP              CAPILLARY BLOOD GLUCOSE          PHYSICAL EXAM:  General:   HEENT:   Lymph Nodes:  Neck:   Respiratory:   Cardiovascular:   Abdomen:   Extremities:   Skin:   Neurological:  Psychiatry:    LINES:     HOSPITAL MEDICATIONS:  Standing Meds:  heparin   Injectable 5000 Unit(s) SubCutaneous every 12 hours      PRN Meds:      LABS:                        7.5    10.75 )-----------( 202      ( 03 Aug 2023 04:07 )             24.6     Hgb Trend: 7.5<--  08-03    141  |  101  |  80<H>  ----------------------------<  90  5.0   |  20<L>  |  18.17<H>    Ca    9.3      03 Aug 2023 05:43  Phos  3.0     08-03  Mg     2.2     08-03    TPro  7.1  /  Alb  3.5  /  TBili  0.4  /  DBili  x   /  AST  13  /  ALT  5<L>  /  AlkPhos  107  08-03    Creatinine Trend: 18.17<--, 17.68<--, 17.50<--    Urinalysis Basic - ( 03 Aug 2023 05:43 )    Color: x / Appearance: x / SG: x / pH: x  Gluc: 90 mg/dL / Ketone: x  / Bili: x / Urobili: x   Blood: x / Protein: x / Nitrite: x   Leuk Esterase: x / RBC: x / WBC x   Sq Epi: x / Non Sq Epi: x / Bacteria: x        Venous Blood Gas:  08-03 @ 03:44  7.43/38/37/25/64.6  VBG Lactate: 0.9      MICROBIOLOGY:       RADIOLOGY:  [ ] Reviewed and interpreted by me    PULMONARY FUNCTION TESTS:    EKG:   CHIEF COMPLAINT: SOB    HPI: Patient is a 57 yo M w/ HTN, HLD, ESRD (M/W/F), GERD who p/w SOB x 2 days. As per patient and chart review, he missed his HD session on monday. Patient was found to be hypoxemic with increased WOB and was initiated on BIPAP 10/5 40%. Patient also hypertensive to SBP 180s, received Hydralazine 100mg PO x1 and Labetalol 200mg PO x1. Of note, patient had presented to ED on 7/22 for similar presentation in setting of missed HD but had eloped from hospital. Endorses dry cough. Endorses improvement in breathing since initiation of HD. Currently on RA saturating 94%. Denies any fevers, chills, sick contacts. When asked why he misses HD so frequently he endorses he was told in the past that if his fistula is accessed too many times it will stop working. Educated about the dangers of missed HD sessions which can result in respiratory failure and even death.    Labs notable for mild leukocytosisi WBC 10.7, Hb 7.5, K 5, Bicarb 20, Creatinine 18.1, Trop 87 then 86 on repeat, BNP 85984. VBG 7.43/38/37. RVP negative. CXR from previous admission 7/22 with dense bilateral airspace opacities. CXR from this admission still with bilateral hazy opacities, but decreased from prior.       PAST MEDICAL & SURGICAL HISTORY:  Benign Essential Hypertension      Hypertension      GERD (gastroesophageal reflux disease)      ESRD on hemodialysis      Hyperlipidemia      Vitamin D deficiency      Tobacco use      HTN (hypertension)      HLD (hyperlipidemia)      History of open reduction and internal fixation (ORIF) procedure  Left Leg  1990  Hardware subsequently removed. Year unknown      AV fistula  Left upper arm 2/2006      No significant past surgical history          FAMILY HISTORY:  Family history of chronic renal disease    Family history of lymphoma    Family history of hypertension in mother  HTN        SOCIAL HISTORY:  Smoking: [ ] Never Smoked [ ] Former Smoker (__ packs x ___ years) [X] Current Smoker  (1 packs x 40 years)  Substance Use: [X] Never Used [ ] Used ____  EtOH Use:  Marital Status: [ ] Single [ ]  [ ]  [ ]   Sexual History:   Occupation: Used to work as a   Recent Travel:  Country of Birth:  Advance Directives:    Allergies    No Known Allergies    Intolerances        HOME MEDICATIONS:  Home Medications:  epoetin ye: 20000 unit(s) hemodialysis 3 times a week  (03 Aug 2023 12:55)  famotidine 20 mg oral tablet: 1 tab(s) orally once a day (03 Aug 2023 12:55)  gabapentin 300 mg oral tablet: 1 tab(s) orally once a day (03 Aug 2023 12:55)  hydrALAZINE 100 mg oral tablet: 1 tab(s) orally 3 times a day (03 Aug 2023 12:55)  labetalol 200 mg oral tablet: 1 tab(s) orally 2 times a day (03 Aug 2023 12:55)  Renagel 800 mg oral tablet: 2 tab(s) orally 3 times a day (03 Aug 2023 12:55)  simvastatin 20 mg oral tablet: 1 tab(s) orally once a day (at bedtime) (03 Aug 2023 12:55)      REVIEW OF SYSTEMS:  Constitutional: [ ] negative [ ] fevers [ ] chills [ ] weight loss [ ] weight gain  HEENT: [ ] negative [ ] dry eyes [ ] eye irritation [ ] postnasal drip [ ] nasal congestion  CV: [ ] negative  [ ] chest pain [ ] orthopnea [ ] palpitations [ ] murmur  Resp: [ ] negative [X] cough [X] shortness of breath [X] dyspnea [ ] wheezing [ ] sputum [ ] hemoptysis  GI: [ ] negative [ ] nausea [ ] vomiting [ ] diarrhea [ ] constipation [ ] abd pain [ ] dysphagia   : [ ] negative [ ] dysuria [ ] nocturia [ ] hematuria [ ] increased urinary frequency  Musculoskeletal: [ ] negative [ ] back pain [ ] myalgias [ ] arthralgias [ ] fracture  Skin: [ ] negative [ ] rash [ ] itch  Neurological: [ ] negative [ ] headache [ ] dizziness [ ] syncope [ ] weakness [ ] numbness  Psychiatric: [ ] negative [ ] anxiety [ ] depression  Endocrine: [ ] negative [ ] diabetes [ ] thyroid problem  Hematologic/Lymphatic: [ ] negative [ ] anemia [ ] bleeding problem  Allergic/Immunologic: [ ] negative [ ] itchy eyes [ ] nasal discharge [ ] hives [ ] angioedema  [X] All other systems negative  [ ] Unable to assess ROS because ________    OBJECTIVE:  ICU Vital Signs Last 24 Hrs  T(C): 36.5 (03 Aug 2023 13:12), Max: 36.9 (03 Aug 2023 03:50)  T(F): 97.7 (03 Aug 2023 13:12), Max: 98.4 (03 Aug 2023 03:50)  HR: 82 (03 Aug 2023 13:12) (82 - 95)  BP: 165/88 (03 Aug 2023 13:12) (164/81 - 189/75)  BP(mean): 98 (03 Aug 2023 12:15) (98 - 108)  ABP: --  ABP(mean): --  RR: 17 (03 Aug 2023 13:12) (16 - 22)  SpO2: 100% (03 Aug 2023 13:12) (95% - 100%)    O2 Parameters below as of 03 Aug 2023 13:12  Patient On (Oxygen Delivery Method): BiPAP/CPAP              CAPILLARY BLOOD GLUCOSE          PHYSICAL EXAM:  General: Restless  HEENT: EOMI, PERRLA  Neck: Supple  Respiratory: CTAB  Cardiovascular: S1S2, RRR  Abdomen: Soft, NTND, BS+  Extremities: No edema  Skin: Warm and dry  Neurological: No focal gross deficits    LINES:     HOSPITAL MEDICATIONS:  Standing Meds:  heparin   Injectable 5000 Unit(s) SubCutaneous every 12 hours      PRN Meds:      LABS:                        7.5    10.75 )-----------( 202      ( 03 Aug 2023 04:07 )             24.6     Hgb Trend: 7.5<--  08-03    141  |  101  |  80<H>  ----------------------------<  90  5.0   |  20<L>  |  18.17<H>    Ca    9.3      03 Aug 2023 05:43  Phos  3.0     08-03  Mg     2.2     08-03    TPro  7.1  /  Alb  3.5  /  TBili  0.4  /  DBili  x   /  AST  13  /  ALT  5<L>  /  AlkPhos  107  08-03    Creatinine Trend: 18.17<--, 17.68<--, 17.50<--    Urinalysis Basic - ( 03 Aug 2023 05:43 )    Color: x / Appearance: x / SG: x / pH: x  Gluc: 90 mg/dL / Ketone: x  / Bili: x / Urobili: x   Blood: x / Protein: x / Nitrite: x   Leuk Esterase: x / RBC: x / WBC x   Sq Epi: x / Non Sq Epi: x / Bacteria: x        Venous Blood Gas:  08-03 @ 03:44  7.43/38/37/25/64.6  VBG Lactate: 0.9      MICROBIOLOGY:       RADIOLOGY:  [ ] Reviewed and interpreted by me    PULMONARY FUNCTION TESTS:    EKG:

## 2023-08-03 NOTE — ED ADULT NURSE REASSESSMENT NOTE - NS ED NURSE REASSESS COMMENT FT1
Patient @ [facility] demanding removal of Peripheral IV. IV removed for high risk of elopement. Follow Up call made to ANGELA Palm who instructed she was en route to assess patient.

## 2023-08-03 NOTE — H&P ADULT - PROBLEM SELECTOR PLAN 2
177/87 BP in ED with hypoxia   Pulmonary edema  b/l crackles on Physical Exam  Urgent HD scheduled today  Restart home meds

## 2023-08-03 NOTE — H&P ADULT - NSHPPHYSICALEXAM_GEN_ALL_CORE
Vital Signs Last 24 Hrs  T(C): 36.6 (03 Aug 2023 07:31), Max: 36.9 (03 Aug 2023 03:50)  T(F): 97.9 (03 Aug 2023 07:31), Max: 98.4 (03 Aug 2023 03:50)  HR: 82 (03 Aug 2023 12:15) (82 - 95)  BP: 166/73 (03 Aug 2023 12:15) (164/81 - 189/75)  BP(mean): 98 (03 Aug 2023 12:15) (98 - 108)  RR: 17 (03 Aug 2023 12:15) (16 - 22)  SpO2: 100% (03 Aug 2023 12:15) (95% - 100%)    Parameters below as of 03 Aug 2023 12:15  Patient On (Oxygen Delivery Method): BiPAP/CPAP        CONSTITUTIONAL: Well-groomed, in no apparent distress  EYES: No conjunctival or scleral injection, non-icteric  ENMT: No external nasal lesions; Normal outer ears  NECK: Trachea midline without palpable neck mass;  RESPIRATORY: Breathing comfortably; bilateral rales  CARDIOVASCULAR: +S1S2, RRR, no M/G/R;  no lower extremity edema  GASTROINTESTINAL: No palpable masses or tenderness, no rebound/guarding  NEUROLOGIC: AAOx3-4  PSYCHIATRIC: Mood and Affect appropriate

## 2023-08-03 NOTE — H&P ADULT - PROBLEM SELECTOR PLAN 3
Urgent HD scheduled today  On MWF  Follows [facility]  Spoke with Dr. Corral who put in HD order

## 2023-08-03 NOTE — ED ADULT NURSE NOTE - OBJECTIVE STATEMENT
57yo M Pmh HTN, ESRD on HD MWF, presents to ED via EMS with c/o SOB x1 day. pt reports he missed his dialysis last Friday so he went Saturday, then missed this past Monday and Wednesday. pt states he missed dialysis twice this week because he does not like needles. pt now endorsing 1 day of shortness of breath. reports he has been taking his home medications as normal. pt denies any chest pain, f/c, n/v/d, or other medical complaints at this time. states he no longer makes urine at baseline. on exam in ED pt is awake and A&Ox4, spontaneous respirations with tachypnea to 22, sating 91% on room air with improvement to 95%+ on 2L NC, hypertensive to 170s/80s, SAHU, + fistula to LUE. Pt seen and eval by ED MD. patient undressed and placed into gown, EKG completed, pt on CCM/. Appropriate side rails up for safety.

## 2023-08-03 NOTE — CONSULT NOTE ADULT - ASSESSMENT
58y Male with PMH of HTN, HLD, ESRD on MWF, GERD presents with shortness of breathe for 2 days after missing hd      1) ESRD on HD-   HD [facility]  Access: left upper ext avfy  Consent in chart  Plan for HD today--> 2k bath and uf 2.5 kg to 2.8kg as tolerated  trend bmp      2) Anemia in CKD  hgb below goal  epo 20k tiw with hd  trend hgb    Dr Corral  [phone]
Patient is a 57 yo M w/ HTN, HLD, ESRD (M/W/F), GERD who p/w SOB x 2 days. As per patient and chart review, he missed his HD session on monday. Patient was found to be hypoxemic with increased WOB and was initiated on BIPAP 10/5 40%. Patient also hypertensive to SBP 180s, received Hydralazine 100mg PO x1 and Labetalol 200mg PO x1. Of note, patient had presented to ED on 7/22 for similar presentation in setting of missed HD but had eloped from hospital. Endorses dry cough. Endorses improvement in breathing since initiation of HD. Currently on RA saturating 94%. Denies any fevers, chills, sick contacts. When asked why he misses HD so frequently he endorses he was told in the past that if his fistula is accessed too many times it will stop working. Educated about the dangers of missed HD sessions which can result in respiratory failure and even death.    Labs notable for mild leukocytosisi WBC 10.7, Hb 7.5, K 5, Bicarb 20, Creatinine 18.1, Trop 87 then 86 on repeat, BNP 73570. VBG 7.43/38/37. RVP negative. CXR from previous admission 7/22 with dense bilateral airspace opacities. CXR from this admission still with bilateral hazy opacities, but decreased from prior.     #Acute hypoxemic respiratory failure - Most likely 2/2 pulmonary edema/volume overload in setting of missed HD sessions. Of note, CXR from previous admission 7/22 with dense bilateral airspace opacities. CXR from this admission still with bilateral hazy opacities, but decreased from prior. CT chest from 2021 with some small nodules  #Pulmonary edema  #Volume overload  #ESRD  - c/w supplemental O2, wean as tolerated  - Can c/w BIPAP 10/5 PRN WOB  - Agree with HD for volume removal  - Repeat CXR after HD  - Educated about the dangers of missed HD sessions which can result in respiratory failure and even death  - Consider TTE if possible    #Pulmonary nodules  - Needs outpatient followup for previously imaged lung nodules    Barry Andrade MD  Pulmonary & Critical Care

## 2023-08-03 NOTE — ED PROVIDER NOTE - OBJECTIVE STATEMENT
58 year old M with hypertension, hyperlipidemia, ESRD on M/W/F presenting with shortness of breath x2 days.  Patient last had dialysis on Saturday.  When asked why he missed dialysis, patient replied that he does not want to be stuck by anyone.  Patient does not have any chest pain.  No lower extremity edema.  Denies fever/chills, abdominal pain, headache, nausea/vomiting/diarrhea, dark or bloody stools.  Of note, per chart review, patient was seen in ED 7/22/23 for similar complaint of SOB in setting of missed dialysis, but eloped.

## 2023-08-03 NOTE — DISCHARGE NOTE PROVIDER - CARE PROVIDER_API CALL
Brandon Corral  Nephrology  [address] [place] [address]  Phone: [phone]  Fax: [phone]  Follow Up Time: 1-3 days    Naz Perales  Nephrology  [address] [place] [address]  Phone: [phone]  Fax: [phone]  Follow Up Time:     Himanshu Aguiar [facility]  [address] [facility], [address] [address]  Phone: [phone]  Fax: [phone]  Follow Up Time:

## 2023-08-03 NOTE — ED ADULT NURSE REASSESSMENT NOTE - NS ED NURSE REASSESS COMMENT FT1
Patient transported from Dialysis. Upon return patient refuses transport to X-ray. Patient refuses to wear supplemental NC @ 4L. Patient refuses to wear cardiac leads & Tele Box. NP Néstor made aware of patients refusals and awaiting NP bedside assessment. Patient transported from Dialysis. Upon return patient refuses transport to X-ray. Patient refuses to wear supplemental NC @ 4L. Patient refuses to wear cardiac leads & Tele Box. Patient refuses blood work and request immediate removal of Peripheral IV. ANGELA Palm made aware of patients refusals and awaiting NP bedside assessment. Per NP IV remains until bedside assessment.

## 2023-08-03 NOTE — H&P ADULT - PROBLEM SELECTOR PLAN 4
HST 87 > 86  EKG personally reviewed. NSR. Nonspecific T wave changes  Likely NSTEMI type II in setting of ESRD

## 2023-08-03 NOTE — DISCHARGE NOTE PROVIDER - NSDCFUSCHEDAPPT_GEN_ALL_CORE_FT
Himanshu Aguiar  [facility]  CARDIOLOGY [address]   Scheduled Appointment: 08/29/2023    [facility]  VASCULAR [address]  Scheduled Appointment: 09/11/2023    Samuel Wray  [facility]  VASCULAR [address]  Scheduled Appointment: 09/11/2023

## 2023-08-03 NOTE — ED PROVIDER NOTE - CLINICAL SUMMARY MEDICAL DECISION MAKING FREE TEXT BOX
58 year old M with hypertension, hyperlipidemia, ESRD on M/W/F presenting with shortness of breath x2 days. Hypertensive 164/88, non tachycardic, hypoxic to high 80s on RA, placed on 2L NC with resolution, non tachycardic, afebrile. Patient dyspneic, tachypneic, crackles bilaterally. No LE edema. No indication for BIPAP at this time. Patient fluid overloaded in setting of missed dialysis. Will send labs, CXR, RVP. TBA for dialysis.

## 2023-08-03 NOTE — ED ADULT NURSE REASSESSMENT NOTE - NS ED NURSE REASSESS COMMENT FT1
Patient walked out of ED despite staff education of risk and benefits of Hospital Admission. Patient educated on risk of respiratory distress by NP while refusing BiPAP and High Flow and Nasal Cannula during hospital stay Patient walked out of ED despite staff education of risk and benefits of Hospital Admission. Patient educated on risk of respiratory distress by NP while refusing BiPAP and High Flow and Nasal Cannula during hospital stay jerry duenas and security outside with staff. ama form completed

## 2023-08-03 NOTE — ED PROVIDER NOTE - PROGRESS NOTE DETAILS
Cari Bishop PGY-3: Patient reassessed. Continues to be dyspneic and tachypneic, to start BIPAP. Cari Bishop PGY-3: Attempted to call patient's nephrologist Dr. Arnett.  No response.  Left voicemail with answering service.  Awaiting callback. Cari Bishop PGY-3: Spoke to Dr. Arnett. To evaluate patient. JEREMY Marquez PGY3 pt frustrated that no one can tell him when he is getting HD. called Dr. Rose's office to get a timeline, called HD unit, they are waiting for report from our RNs. RN aware and will call now.

## 2023-08-03 NOTE — ED ADULT NURSE REASSESSMENT NOTE - NS ED NURSE REASSESS COMMENT FT1
Patient verbalized wanting to leave AMA, as well as refusal to wear cardiac leads. ANGELA Avilez made aware.

## 2023-08-03 NOTE — ED PROVIDER NOTE - ATTENDING CONTRIBUTION TO CARE
I, Dr. Tara Haskins, have personally performed a face to face medical and diagnostic evaluation of the patient. I have discussed with and reviewed the Resident's and/or ACP's and/or Medical/PA/NP student's note and agree with the History, ROS, Physical Exam and MDM unless otherwise indicated. A brief summary of my personal evaluation and impression can be found below.    Patient is a 58-year-old male with history of hypertension, hyperlipidemia, ESRD on HD via left upper extremity fistula presenting with shortness of breath and missed dialysis.  Patient states "I do not want to be stuck by anyone".  Patient reports progressively worsening difficulty breathing for 2 days.    Denies chest pain, palpitations, fevers, chills, nausea, vomiting.    Nontoxic male, diffuse crackles, mild tachypnea, no leg edema, L UE fistula with thrill.    58-year-old male on dialysis presenting with shortness of breath and missed dialysis.  Patient hypertensive, mildly tachypneic with crackles bilaterally.  Concern is for fluid overload in the setting of missed dialysis.  Doubt ACS.  Possible electrolyte abnormality.  EKG with isolated peaked T waves anteriorly.  Plan for labs, chest x-ray, cardiac monitoring, antihypertensives and anticipate admission for dialysis.  Low threshold to escalate to BiPAP if necessary.

## 2023-08-03 NOTE — ED ADULT NURSE REASSESSMENT NOTE - NS ED NURSE REASSESS COMMENT FT1
Patient transported to dialysis off tele. As per provider patient needs to be on tele during Dialysis. Dialysis RN Lelia made aware.

## 2023-08-03 NOTE — DISCHARGE NOTE PROVIDER - CARE PROVIDERS DIRECT ADDRESSES
,DirectAddress_Unknown,DirectAddress_Unknown,[email or web address]

## 2023-08-03 NOTE — ED PROVIDER NOTE - PHYSICAL EXAMINATION
GENERAL: Awake, alert, NAD  HEENT: NC/AT, moist mucous membranes, PERRL, EOMI  LUNGS: Tachypneic, crackles bilaterally  CARDIAC: RRR, no m/r/g  ABDOMEN: Soft, normal BS, non tender, non distended, no rebound, no guarding  BACK: No midline spinal tenderness, no CVA tenderness  EXT: No edema, no calf tenderness, 2+ DP pulses bilaterally, no deformities.  NEURO: A&Ox3. Moving all extremities.  SKIN: Warm and dry. No rash.  PSYCH: Normal affect. GENERAL: Awake, alert, NAD  HEENT: NC/AT, moist mucous membranes, PERRL, EOMI  LUNGS: Tachypneic, crackles bilaterally  CARDIAC: RRR, no m/r/g  ABDOMEN: Soft, normal BS, non tender, non distended, no rebound, no guarding  BACK: No midline spinal tenderness, no CVA tenderness  EXT: No edema, no calf tenderness, 2+ DP pulses bilaterally, no deformities. LUE fistula.   NEURO: A&Ox3. Moving all extremities.  SKIN: Warm and dry. No rash.  PSYCH: Normal affect.

## 2023-08-03 NOTE — ED ADULT NURSE REASSESSMENT NOTE - NS ED NURSE REASSESS COMMENT FT1
Respiratory contacted to transition patient off of BiPaP and onto Nasal Cannula if tolerated. Respiratory en route.

## 2023-08-03 NOTE — H&P ADULT - HISTORY OF PRESENT ILLNESS
58y Male with PMH of HTN, HLD, ESRD on MWF, GERD presents with shortness of breathe for 2 days. Patient missed HD session on monday because he did not want to be stuck by needle. At the time of the interview, patient is on biPAP, restless, requesting to signout AMA if does not go for HD soon. Patient was educated the importance of HD and how it may help with his breathing. Spoke with Dr. Corral from [facility] who had put in HD orders. Patient This is his second admission in last 2 weeks with similar complaints due to missed HD. Limited HPI obtained due to patients critical condition

## 2023-08-03 NOTE — DISCHARGE NOTE PROVIDER - HOSPITAL COURSE
58y Male with PMH of HTN, HLD, ESRD on MWF, GERD presents with shortness of breathe for 2 days. Found to be in volume overload with HTN				  													  Acute respiratory failure with hypoxia.   -Presented with SOB x2 days in setting of missed HD- last session on Saturday   -Required biPAP overnight --> pt refusing HFNC or BiPAP- satting 90's on 4L NC   -CXR showed improved bilateral patchy opacities, planned for repeat CXR status post HD- pt left AMA prior, refusing CXR   -ABG 4 am 7.43/38/37/25 O2 Sat 65  -Hypoxic respiratory failure sat 100% on 40% biPAP FiO2   -Ordered for procalc, MRSA nares, sputum culture, blood cx, legionella/strep urine AG  -Pulmonary consulted  -CT chest from 2021 with some small nodules- Needs outpatient followup for previously imaged lung nodules  -Follow up with pulm outpatient     Hypertensive emergency.   - 177/87 BP in ED with hypoxia   -Pulmonary edema  -b/l crackles asucultated on Physical Exam  -Urgent HD scheduled today 8/3/23     ESRD on hemodialysis.   - Urgent HD scheduled today  -Nephrology consulted   -Outpatient, receives HD on MWF at [facility] via LUE AVF. Continue as scheduled       NSTEMI (non-ST elevation myocardial infarction).   -HST 87 > 86  - EKG personally reviewed. NSR. Nonspecific T wave changes  - Likely NSTEMI type II in setting of ESRD.          Pt not medically cleared for DC- pt left AMA

## 2023-08-03 NOTE — DISCHARGE NOTE PROVIDER - NSDCFUADDAPPT_GEN_ALL_CORE_FT
APPTS ARE READY TO BE MADE: [X] YES    Best Family or Patient Contact (if needed):    Additional Information about above appointments (if needed):    1: Follow up with nephrologist Dr. Brandon Corral/ Dr. Perales within 1-3 days outpatient   2: Follow up with cardiologist Dr.Evan rodas outpatient within 1 week   3: Follow up with PCP within 1-2 weeks of discharge       Other comments or requests:    APPTS ARE READY TO BE MADE: [X] YES    Best Family or Patient Contact (if needed):    Additional Information about above appointments (if needed):    1: Follow up with nephrologist Dr. Brandon Corral/ Dr. Perales within 1-3 days outpatient   2: Follow up with cardiologist Dr.Evan rodas outpatient within 1 week   3: Follow up with PCP within 1-2 weeks of discharge       Other comments or requests:   Patient was previously scheduled with Dr. Himanshu Rodas on 8/29/23 at 12:15pm at [address] [place] [address]   Outreached on (8/8, 8/9 and 8/10) which have been unsuccessful. Will await call back from patient/caregiver to coordinate follow up care.

## 2023-08-03 NOTE — DISCHARGE NOTE PROVIDER - NSDCFUADDINST_GEN_ALL_CORE_FT
Continue HD at HD center- [facility] for M-W-F schedule. Follow up with nephrology outpatient

## 2023-08-03 NOTE — DISCHARGE NOTE PROVIDER - PROVIDER TOKENS
PROVIDER:[TOKEN:[[id number]],FOLLOWUP:[1-3 days]],PROVIDER:[TOKEN:[[id number]]],PROVIDER:[TOKEN:[[id number]]]

## 2023-08-03 NOTE — DISCHARGE NOTE PROVIDER - NSDCCPCAREPLAN_GEN_ALL_CORE_FT
PRINCIPAL DISCHARGE DIAGNOSIS  Diagnosis: Acute respiratory failure with hypoxia  Assessment and Plan of Treatment: in setting of missed HD  follow up with pulm and PCP outpatient  if symptoms return or worsen, contact provider or go to nearest hospital  Do  NOT skip HD sessions      SECONDARY DISCHARGE DIAGNOSES  Diagnosis: Hypertensive emergency  Assessment and Plan of Treatment: Take medications for your blood pressure as recommended.  Eat a heart healthy diet that is low in saturated fats and salt, and includes whole grains, fruits, vegetables and lean protein   Exercise regularly (consult with your physician or cardiologist first); maintain a heart healthy weight.   If you smoke - quit (A resource to help you stop smoking is the [facility] for Tobacco Control – phone number [phone].). Continue to follow with your primary physician or cardiologist.   Seek medical help for dizziness, Lightheadedness, Blurry vision, Headache, Chest pain, Shortness of breath  Follow up with your medical doctor to establish long term blood pressure treatment goals.      Diagnosis: ESRD on dialysis  Assessment and Plan of Treatment: Avoid taking (NSAIDs) - (ex: Ibuprofen, Advil, Celebrex, Naprosyn)  Avoid taking any nephrotoxic agents (can harm kidneys) - Intravenous contrast for diagnostic testing, combination cold medications.  Have all medications adjusted for your renal function by your Health Care Provider.  Blood pressure control is important.  Take all medication as prescribed.

## 2023-08-03 NOTE — ED ADULT NURSE NOTE - NSFALLUNIVINTERV_ED_ALL_ED
Bed/Stretcher in lowest position, wheels locked, appropriate side rails in place/Call bell, personal items and telephone in reach/Instruct patient to call for assistance before getting out of bed/chair/stretcher/Non-slip footwear applied when patient is off stretcher/[place] to call system/Physically safe environment - no spills, clutter or unnecessary equipment/Purposeful proactive rounding/Room/bathroom lighting operational, light cord in reach 88

## 2023-08-03 NOTE — CONSULT NOTE ADULT - REASON FOR ADMISSION
0715 - Received report from Eulalia PINTO RN.  Pt in shower, states she feels better in the shower.   with pt.  Pt denies any headaches or visual disturbances.  PT states she has had some nauseous ness and had vomited earlier. [place] reapplied while in shower.  All needs met at this time.    
sob
sob

## 2023-08-03 NOTE — H&P ADULT - NSHPLABSRESULTS_GEN_ALL_CORE
Records reviewed from prior hospitalization. HST 70-80s since 01/2021. EKG 07/2023 NSR. Nonspecific T wave changes  Labs reviewed remarkable for GFR 64k, HST 87 > 86  EKG personally reviewed. NSR. Nonspecific T wave changes  CXR personally reviewed b/l patchy opacities. Decreased from 07/2023

## 2023-08-03 NOTE — ED ADULT NURSE REASSESSMENT NOTE - NS ED NURSE REASSESS COMMENT FT1
md quiroga at bedside to educate patient on importance of cardiac monitoring.  pt placed back on tele monitoring and bipap. as per md, ok to hold off on abg and highflow until after dialysis.

## 2023-08-03 NOTE — H&P ADULT - ASSESSMENT
58y Male with PMH of HTN, HLD, ESRD on MWF, GERD presents with shortness of breathe for 2 days. Found to be in volume overload with HTN    Pharmacy tech to complete med-rec as patient unstable at the time ofinterview

## 2023-08-05 NOTE — ED ADULT NURSE NOTE - PASSIVE COMMENT
Patient experiencing altered mental status. Blurry vision, word finding difficulties. 
PT Arterial line is 30 points off from NIBP
PT has arterial line for SBP monitoring PT is currently on HARJEET for a SBP goal of 160-200. There is a 30 to 40 point difference in Mima to NIBP
reports smoking cigarettes

## 2023-08-07 ENCOUNTER — INPATIENT (INPATIENT)
Facility: HOSPITAL | Age: 58
LOS: 0 days | Discharge: HOME | DRG: 291 | End: 2023-08-07
Attending: HOSPITALIST | Admitting: HOSPITALIST
Payer: MEDICAID

## 2023-08-07 VITALS
OXYGEN SATURATION: 95 % | WEIGHT: 188.5 LBS | DIASTOLIC BLOOD PRESSURE: 102 MMHG | TEMPERATURE: 98 F | RESPIRATION RATE: 24 BRPM | HEIGHT: 70 IN | HEART RATE: 90 BPM | SYSTOLIC BLOOD PRESSURE: 192 MMHG

## 2023-08-07 VITALS
SYSTOLIC BLOOD PRESSURE: 163 MMHG | RESPIRATION RATE: 18 BRPM | TEMPERATURE: 98 F | OXYGEN SATURATION: 93 % | HEART RATE: 84 BPM | DIASTOLIC BLOOD PRESSURE: 87 MMHG

## 2023-08-07 DIAGNOSIS — Z29.9 ENCOUNTER FOR PROPHYLACTIC MEASURES, UNSPECIFIED: ICD-10-CM

## 2023-08-07 DIAGNOSIS — N18.9 CHRONIC KIDNEY DISEASE, UNSPECIFIED: ICD-10-CM

## 2023-08-07 DIAGNOSIS — R06.02 SHORTNESS OF BREATH: ICD-10-CM

## 2023-08-07 DIAGNOSIS — Z98.89 OTHER SPECIFIED POSTPROCEDURAL STATES: Chronic | ICD-10-CM

## 2023-08-07 DIAGNOSIS — K21.9 GASTRO-ESOPHAGEAL REFLUX DISEASE WITHOUT ESOPHAGITIS: ICD-10-CM

## 2023-08-07 DIAGNOSIS — N18.6 END STAGE RENAL DISEASE: ICD-10-CM

## 2023-08-07 DIAGNOSIS — J96.01 ACUTE RESPIRATORY FAILURE WITH HYPOXIA: ICD-10-CM

## 2023-08-07 DIAGNOSIS — I10 ESSENTIAL (PRIMARY) HYPERTENSION: ICD-10-CM

## 2023-08-07 DIAGNOSIS — I77.0 ARTERIOVENOUS FISTULA, ACQUIRED: Chronic | ICD-10-CM

## 2023-08-07 LAB
ALBUMIN SERPL ELPH-MCNC: 3.5 G/DL — SIGNIFICANT CHANGE UP (ref 3.3–5)
ALP SERPL-CCNC: 100 U/L — SIGNIFICANT CHANGE UP (ref 40–120)
ALT FLD-CCNC: <5 U/L — LOW (ref 10–45)
ANION GAP SERPL CALC-SCNC: 19 MMOL/L — HIGH (ref 5–17)
APTT BLD: 34.9 SEC — SIGNIFICANT CHANGE UP (ref 24.5–35.6)
AST SERPL-CCNC: 9 U/L — LOW (ref 10–40)
BASE EXCESS BLDV CALC-SCNC: 0.5 MMOL/L — SIGNIFICANT CHANGE UP (ref -2–3)
BASOPHILS # BLD AUTO: 0.05 K/UL — SIGNIFICANT CHANGE UP (ref 0–0.2)
BASOPHILS NFR BLD AUTO: 0.5 % — SIGNIFICANT CHANGE UP (ref 0–2)
BILIRUB SERPL-MCNC: 0.3 MG/DL — SIGNIFICANT CHANGE UP (ref 0.2–1.2)
BUN SERPL-MCNC: 84 MG/DL — HIGH (ref 7–23)
CA-I SERPL-SCNC: 1.2 MMOL/L — SIGNIFICANT CHANGE UP (ref 1.15–1.33)
CALCIUM SERPL-MCNC: 9.3 MG/DL — SIGNIFICANT CHANGE UP (ref 8.4–10.5)
CHLORIDE BLDV-SCNC: 106 MMOL/L — SIGNIFICANT CHANGE UP (ref 96–108)
CHLORIDE SERPL-SCNC: 100 MMOL/L — SIGNIFICANT CHANGE UP (ref 96–108)
CO2 BLDV-SCNC: 27 MMOL/L — HIGH (ref 22–26)
CO2 SERPL-SCNC: 21 MMOL/L — LOW (ref 22–31)
CREAT SERPL-MCNC: 18.42 MG/DL — HIGH (ref 0.5–1.3)
EGFR: 3 ML/MIN/1.73M2 — LOW
EOSINOPHIL # BLD AUTO: 0.46 K/UL — SIGNIFICANT CHANGE UP (ref 0–0.5)
EOSINOPHIL NFR BLD AUTO: 4.9 % — SIGNIFICANT CHANGE UP (ref 0–6)
GAS PNL BLDV: 140 MMOL/L — SIGNIFICANT CHANGE UP (ref 136–145)
GAS PNL BLDV: SIGNIFICANT CHANGE UP
GAS PNL BLDV: SIGNIFICANT CHANGE UP
GLUCOSE BLDV-MCNC: 97 MG/DL — SIGNIFICANT CHANGE UP (ref 70–99)
GLUCOSE SERPL-MCNC: 94 MG/DL — SIGNIFICANT CHANGE UP (ref 70–99)
HCO3 BLDV-SCNC: 25 MMOL/L — SIGNIFICANT CHANGE UP (ref 22–29)
HCT VFR BLD CALC: 23.6 % — LOW (ref 39–50)
HCT VFR BLD CALC: 23.7 % — LOW (ref 39–50)
HCT VFR BLDA CALC: 24 % — LOW (ref 39–51)
HGB BLD CALC-MCNC: 7.9 G/DL — LOW (ref 12.6–17.4)
HGB BLD-MCNC: 7.3 G/DL — LOW (ref 13–17)
HGB BLD-MCNC: 7.5 G/DL — LOW (ref 13–17)
HOROWITZ INDEX BLDV+IHG-RTO: SIGNIFICANT CHANGE UP
IMM GRANULOCYTES NFR BLD AUTO: 0.8 % — SIGNIFICANT CHANGE UP (ref 0–0.9)
INR BLD: 1.17 RATIO — SIGNIFICANT CHANGE UP (ref 0.85–1.18)
LACTATE BLDV-MCNC: 0.9 MMOL/L — SIGNIFICANT CHANGE UP (ref 0.5–2)
LYMPHOCYTES # BLD AUTO: 1.55 K/UL — SIGNIFICANT CHANGE UP (ref 1–3.3)
LYMPHOCYTES # BLD AUTO: 16.7 % — SIGNIFICANT CHANGE UP (ref 13–44)
MAGNESIUM SERPL-MCNC: 2.3 MG/DL — SIGNIFICANT CHANGE UP (ref 1.6–2.6)
MCHC RBC-ENTMCNC: 29.2 PG — SIGNIFICANT CHANGE UP (ref 27–34)
MCHC RBC-ENTMCNC: 29.3 PG — SIGNIFICANT CHANGE UP (ref 27–34)
MCHC RBC-ENTMCNC: 30.9 GM/DL — LOW (ref 32–36)
MCHC RBC-ENTMCNC: 31.6 GM/DL — LOW (ref 32–36)
MCV RBC AUTO: 92.6 FL — SIGNIFICANT CHANGE UP (ref 80–100)
MCV RBC AUTO: 94.4 FL — SIGNIFICANT CHANGE UP (ref 80–100)
MONOCYTES # BLD AUTO: 1.05 K/UL — HIGH (ref 0–0.9)
MONOCYTES NFR BLD AUTO: 11.3 % — SIGNIFICANT CHANGE UP (ref 2–14)
NEUTROPHILS # BLD AUTO: 6.12 K/UL — SIGNIFICANT CHANGE UP (ref 1.8–7.4)
NEUTROPHILS NFR BLD AUTO: 65.8 % — SIGNIFICANT CHANGE UP (ref 43–77)
NRBC # BLD: 0 /100 WBCS — SIGNIFICANT CHANGE UP (ref 0–0)
NRBC # BLD: 0 /100 WBCS — SIGNIFICANT CHANGE UP (ref 0–0)
PCO2 BLDV: 41 MMHG — LOW (ref 42–55)
PH BLDV: 7.4 — SIGNIFICANT CHANGE UP (ref 7.32–7.43)
PHOSPHATE SERPL-MCNC: 3.3 MG/DL — SIGNIFICANT CHANGE UP (ref 2.5–4.5)
PLATELET # BLD AUTO: 213 K/UL — SIGNIFICANT CHANGE UP (ref 150–400)
PLATELET # BLD AUTO: 224 K/UL — SIGNIFICANT CHANGE UP (ref 150–400)
PO2 BLDV: 27 MMHG — SIGNIFICANT CHANGE UP (ref 25–45)
POTASSIUM BLDV-SCNC: 5.1 MMOL/L — SIGNIFICANT CHANGE UP (ref 3.5–5.1)
POTASSIUM SERPL-MCNC: 4.6 MMOL/L — SIGNIFICANT CHANGE UP (ref 3.5–5.3)
POTASSIUM SERPL-SCNC: 4.6 MMOL/L — SIGNIFICANT CHANGE UP (ref 3.5–5.3)
PROT SERPL-MCNC: 7 G/DL — SIGNIFICANT CHANGE UP (ref 6–8.3)
PROTHROM AB SERPL-ACNC: 12.2 SEC — SIGNIFICANT CHANGE UP (ref 9.5–13)
RBC # BLD: 2.5 M/UL — LOW (ref 4.2–5.8)
RBC # BLD: 2.56 M/UL — LOW (ref 4.2–5.8)
RBC # FLD: 17.7 % — HIGH (ref 10.3–14.5)
RBC # FLD: 17.8 % — HIGH (ref 10.3–14.5)
SAO2 % BLDV: 47.8 % — LOW (ref 67–88)
SODIUM SERPL-SCNC: 140 MMOL/L — SIGNIFICANT CHANGE UP (ref 135–145)
WBC # BLD: 9.3 K/UL — SIGNIFICANT CHANGE UP (ref 3.8–10.5)
WBC # BLD: 9.89 K/UL — SIGNIFICANT CHANGE UP (ref 3.8–10.5)
WBC # FLD AUTO: 9.3 K/UL — SIGNIFICANT CHANGE UP (ref 3.8–10.5)
WBC # FLD AUTO: 9.89 K/UL — SIGNIFICANT CHANGE UP (ref 3.8–10.5)

## 2023-08-07 PROCEDURE — 99291 CRITICAL CARE FIRST HOUR: CPT

## 2023-08-07 PROCEDURE — 99261: CPT

## 2023-08-07 PROCEDURE — 99223 1ST HOSP IP/OBS HIGH 75: CPT | Mod: GC

## 2023-08-07 PROCEDURE — 85025 COMPLETE CBC W/AUTO DIFF WBC: CPT

## 2023-08-07 PROCEDURE — 86900 BLOOD TYPING SEROLOGIC ABO: CPT

## 2023-08-07 PROCEDURE — 85610 PROTHROMBIN TIME: CPT

## 2023-08-07 PROCEDURE — 86901 BLOOD TYPING SEROLOGIC RH(D): CPT

## 2023-08-07 PROCEDURE — 99285 EMERGENCY DEPT VISIT HI MDM: CPT

## 2023-08-07 PROCEDURE — 83605 ASSAY OF LACTIC ACID: CPT

## 2023-08-07 PROCEDURE — 82330 ASSAY OF CALCIUM: CPT

## 2023-08-07 PROCEDURE — 82947 ASSAY GLUCOSE BLOOD QUANT: CPT

## 2023-08-07 PROCEDURE — 86850 RBC ANTIBODY SCREEN: CPT

## 2023-08-07 PROCEDURE — 85730 THROMBOPLASTIN TIME PARTIAL: CPT

## 2023-08-07 PROCEDURE — 82803 BLOOD GASES ANY COMBINATION: CPT

## 2023-08-07 PROCEDURE — 80053 COMPREHEN METABOLIC PANEL: CPT

## 2023-08-07 PROCEDURE — 96374 THER/PROPH/DIAG INJ IV PUSH: CPT

## 2023-08-07 PROCEDURE — 36415 COLL VENOUS BLD VENIPUNCTURE: CPT

## 2023-08-07 PROCEDURE — 82435 ASSAY OF BLOOD CHLORIDE: CPT

## 2023-08-07 PROCEDURE — 85014 HEMATOCRIT: CPT

## 2023-08-07 PROCEDURE — 84100 ASSAY OF PHOSPHORUS: CPT

## 2023-08-07 PROCEDURE — 85018 HEMOGLOBIN: CPT

## 2023-08-07 PROCEDURE — 84295 ASSAY OF SERUM SODIUM: CPT

## 2023-08-07 PROCEDURE — 84132 ASSAY OF SERUM POTASSIUM: CPT

## 2023-08-07 PROCEDURE — 71045 X-RAY EXAM CHEST 1 VIEW: CPT | Mod: 26

## 2023-08-07 PROCEDURE — 85027 COMPLETE CBC AUTOMATED: CPT

## 2023-08-07 PROCEDURE — 83735 ASSAY OF MAGNESIUM: CPT

## 2023-08-07 PROCEDURE — 71045 X-RAY EXAM CHEST 1 VIEW: CPT

## 2023-08-07 RX ORDER — SIMVASTATIN 20 MG/1
20 TABLET, FILM COATED ORAL AT BEDTIME
Refills: 0 | Status: DISCONTINUED | OUTPATIENT
Start: 2023-08-07 | End: 2023-08-07

## 2023-08-07 RX ORDER — ERYTHROPOIETIN 10000 [IU]/ML
20000 INJECTION, SOLUTION INTRAVENOUS; SUBCUTANEOUS
Refills: 0 | Status: DISCONTINUED | OUTPATIENT
Start: 2023-08-07 | End: 2023-08-07

## 2023-08-07 RX ORDER — FAMOTIDINE 10 MG/ML
20 INJECTION INTRAVENOUS DAILY
Refills: 0 | Status: DISCONTINUED | OUTPATIENT
Start: 2023-08-07 | End: 2023-08-07

## 2023-08-07 RX ORDER — HYDRALAZINE HCL 50 MG
100 TABLET ORAL THREE TIMES A DAY
Refills: 0 | Status: DISCONTINUED | OUTPATIENT
Start: 2023-08-07 | End: 2023-08-07

## 2023-08-07 RX ORDER — ACETAMINOPHEN 500 MG
650 TABLET ORAL EVERY 6 HOURS
Refills: 0 | Status: DISCONTINUED | OUTPATIENT
Start: 2023-08-07 | End: 2023-08-07

## 2023-08-07 RX ORDER — CALCIUM GLUCONATE 100 MG/ML
2 VIAL (ML) INTRAVENOUS ONCE
Refills: 0 | Status: COMPLETED | OUTPATIENT
Start: 2023-08-07 | End: 2023-08-07

## 2023-08-07 RX ORDER — SEVELAMER CARBONATE 2400 MG/1
1600 POWDER, FOR SUSPENSION ORAL THREE TIMES A DAY
Refills: 0 | Status: DISCONTINUED | OUTPATIENT
Start: 2023-08-07 | End: 2023-08-07

## 2023-08-07 RX ORDER — LABETALOL HCL 100 MG
200 TABLET ORAL
Refills: 0 | Status: DISCONTINUED | OUTPATIENT
Start: 2023-08-07 | End: 2023-08-07

## 2023-08-07 RX ORDER — GABAPENTIN 400 MG/1
300 CAPSULE ORAL DAILY
Refills: 0 | Status: DISCONTINUED | OUTPATIENT
Start: 2023-08-07 | End: 2023-08-07

## 2023-08-07 RX ORDER — HEPARIN SODIUM 5000 [USP'U]/ML
5000 INJECTION INTRAVENOUS; SUBCUTANEOUS EVERY 12 HOURS
Refills: 0 | Status: DISCONTINUED | OUTPATIENT
Start: 2023-08-07 | End: 2023-08-07

## 2023-08-07 RX ORDER — PANTOPRAZOLE SODIUM 20 MG/1
40 TABLET, DELAYED RELEASE ORAL
Refills: 0 | Status: DISCONTINUED | OUTPATIENT
Start: 2023-08-07 | End: 2023-08-07

## 2023-08-07 RX ORDER — FAMOTIDINE 10 MG/ML
20 INJECTION INTRAVENOUS
Refills: 0 | Status: DISCONTINUED | OUTPATIENT
Start: 2023-08-07 | End: 2023-08-07

## 2023-08-07 RX ADMIN — Medication 200 GRAM(S): at 06:43

## 2023-08-07 NOTE — ED ADULT NURSE REASSESSMENT NOTE - NS ED NURSE REASSESS COMMENT FT1
Patient returned from dialysis. No acute distress noted. Breathing spontaneous and unlabored on room air. Skin warm dry and of color appropriate for ethnicity. Patient states he would like to leave. KIERAN Desouza made aware, patient to be AMA.

## 2023-08-07 NOTE — H&P ADULT - ASSESSMENT
58yoM hx ESRD on HD a/w acute hypoxic respiratory failure due to fluid overload due to missed HD session

## 2023-08-07 NOTE — ED PROVIDER NOTE - PROGRESS NOTE DETAILS
Jarrett ZHENG: Spoke w/ nephrologist Dr. Brandon Corrla  about patient and need for dialysis. Will call back w/ potassium level. India Rodriguez- updated Dr. Corral w/ results. spoke to hospitalist for admission.

## 2023-08-07 NOTE — ED PROVIDER NOTE - HIV OFFER
Initial / Assessment/Plan of Care Note     Baseline Assessment  69 year old admitted 6/17/2021 as Observation with a diagnosis of hyperkalemia.   Prior to admission patient was living with Spouse/significant other and residing at House.  Patient does  have a Power of  for Healthcare.  Document is not activated.  Agent is her  Aydin Tinoco.  Patient’s Primary Care Provider is Yvonne Fritz MD.     Medical History  Past Medical History:   Diagnosis Date   • Asthma    • Asymptomatic varicose veins    • Atherosclerosis of native arteries of the extremities with intermittent claudication    • Atherosclerosis of native arteries of the extremities with rest pain    • Bell palsy     as a child   • Benign neoplasm of colon 8/25/04, 11/11/09    tubular adenoma, hyperplastic / colonoscopy 11/11/09   • Benign neoplasm of colon    • Benign neoplasm of colon 2/4/15    Dr. Morgan   • Blood clot associated with vein wall inflammation    • Carotid arterial disease (CMS/HCC) 03/2013    needs repeat duplex 3/14   • Coronary atherosclerosis of native coronary artery 1996    MI x2   • Decubitus ulcer of coccyx, unstageable (CMS/LTAC, located within [facility]) 2/19/2021   • Fracture    • Laryngeal cancer (CMS/HCC) 04/30/2021   • Lung nodule 3/13    referred to pulmonary   • Mild COPD (chronic obstructive pulmonary disease) 4/30/2013   • Mycobacterium avium complex (CMS/HCC)     declines treatment   • Non-small cell lung cancer (CMS/HCC) 9/13    S/p RLL lobectomy-ductal ca in situ--no ct or rt needed   • On home O2     2 L at HS   • Other acute and subacute form of ischemic heart disease 1996   • Other and unspecified hyperlipidemia    • Other specified disorder of breast     breast abscesses left   • Pneumonia    • PONV (postoperative nausea and vomiting)    • PVD (peripheral vascular disease) (CMS/HCC) 11/13    embolic--rx at [facility] --chronic coumadin   • PVD (peripheral vascular disease) (CMS/HCC) 5/14    R CFA endarterectomy, L SFA  angio and stent   • s/p ISI wedge resection 12/21/2016    MAC on culture   • s/p RUL wedge resection 1/8/2018   • Special screening for malignant neoplasms, colon 2/4/15    Dr. Morgan   • Swelling, mass, or lump in chest 2003    cyst   • Ventral hernia 08/15/2019    Dr. Mcneil-Ventral hernia repair       Prior to Admission Status  Functional Status  Ambulation: Walker, Cane  Bathing: Sponge Bath, Independent/Self  Dressing: Independent/Self  Toileting: Independent/Self, Raised Toilet Seat  Meal Preparation: Significant Other  Shopping: Significant Other  Medication Preparation: Independent/Self  Medication Administration: Independent/Self  Housekeeping: Significant Other  Laundry: Significant Other  Transportation: Significant Other, Independent/Self    Agency/Support  Type of Services Prior to Hospitalization: Nurse (specify) (tube feedings)  Support Systems: Spouse/Significant other  Home Devices/Equipment: Sensory assist device, Mobility assist device  Mobility Assist Devices: Standard walker  Sensory Support Devices: Eyeglasses    Current Status  PT Ambulation Tips:    PT Transfer Tips:     OT Bathing Tips:    OT Dressing Tips:    OT Toileting Tips:    OT Feeding Tips:    SLP Swallow/Feeding Tips:    SLP Comm/Cog Tips:    Current Mental Status: Cooperative, Pleasant  Stressors:      Insurance  Primary: ANTHEM/BCBS  Secondary: N/A    Barriers to Discharge  Identified Barriers to Discharge/Transition Planning: Assessment/stabilization in progress    Progress Note  SW consulted for DC plan.  Chart reviewed. Received update from RN in OFTs.  Pt admitted for hyperkalemia.      SW met with pt.  Introduced self and role.  Pt AOX3 and receptive.  Pt admitted from a 2 story house with her .  Uses a cane or walker at baseline.  Pt has a G tube, but reports oral intake at home.  Pt sponge bathes independently.  Her  completes cooking, cleaning, housekeeping, laundry and shopping.  Pt drives herself to her  dialysis appointments when she is feeling up to it.  If she cannot drive, her  or neighbor will drive her.  Pt's  works full time second shift. Pt uses 2L supplemental O2 overnight at home.  O2 provided by [employer].  Pt was have home RN visits from [facility] for G tube management.  G tube supplies through [facility].     Pt attends OP HD at [facility] ([address] [facility] [address], [place], [place], [address])   Telephone: [phone]  Fax: [phone]  Chair time: T/Th/S at 10:30  Called and updated [facility].  Will provide DC summary when available.    Pt is active with [facility] (p: [phone], F: [phone]).  Called and notified [facility] of pt admission.  They were seeing pt for RN for observation / assessment 2x/wk.  [facility] is requesting resumption orders.  Message sent to MD requesting orders.      Plan  SW/CM - Recommendations for Discharge:     PT - Recommendations for Discharge:      OT - Recommendations for Discharge:      SLP - Recommendations for Discharge:     Anticipate patient will need post-hospital services. Necessary services are available.  Anticipate patient can return to the environment from which patient entered the hospital.   Anticipate patient can provide self-care at discharge.    Refer to SW/CM Flowsheet for Goals and objective data.      Previously Declined (within the last year)

## 2023-08-07 NOTE — ED PROVIDER NOTE - ATTENDING CONTRIBUTION TO CARE
Patient with history of ESRD, MWF dialysis, missed dialysis on Friday after  leaving AMA from hospital after treatment of respiratory distress chronic dialysis, here with shortness of breath.  On exam patient is mildly tachypneic, hypoxic requiring 2 L to saturate in the mid 90s, crackles, pitting edema.  Rest of vital signs are unremarkable.  EKG shows hyperacute T waves laterally.  Patient received calcium for suspected hyperkalemia, will have labs checked, chest x-ray for fluid overload, nephrology aware and will dialyze him this morning.   Work-up will be done to rule out other potential etiologies such as infection, less likely ACS, CHF. Patient will require admission.

## 2023-08-07 NOTE — ED ADULT NURSE REASSESSMENT NOTE - NS ED NURSE REASSESS COMMENT FT1
Patient eloped prior to AMA form being completed by KIERAN Desouza. KIERAN Desouza made aware that patient left ED prior to AMA. No IV in place. MARTINA Germain made aware.

## 2023-08-07 NOTE — H&P ADULT - NSHPPHYSICALEXAM_GEN_ALL_CORE
Vital Signs Last 24 Hrs  T(C): 36.7 (07 Aug 2023 12:00), Max: 36.9 (07 Aug 2023 07:25)  T(F): 98.1 (07 Aug 2023 12:00), Max: 98.5 (07 Aug 2023 10:02)  HR: 89 (07 Aug 2023 12:00) (86 - 92)  BP: 185/91 (07 Aug 2023 12:00) (154/76 - 192/102)  BP(mean): 97 (07 Aug 2023 10:02) (97 - 120)  RR: 18 (07 Aug 2023 12:00) (16 - 24)  SpO2: 95% (07 Aug 2023 12:00) (95% - 97%)    Parameters below as of 07 Aug 2023 12:00  Patient On (Oxygen Delivery Method): nasal cannula  O2 Flow (L/min): 2      CONSTITUTIONAL: mild distress due to dyspnea  EYES: No conjunctival or scleral injection, non-icteric;   ENMT: No external nasal lesions; MMM  NECK: Trachea midline without palpable neck mass; thyroid not enlarged and non-tender  RESPIRATORY: crackles b/l up to mid lung  CARDIOVASCULAR: tachy +S1S2, RRR, no M/G/R; pedal pulses full and symmetric; no lower extremity edema  GASTROINTESTINAL: No palpable masses or tenderness, +BS throughout, no rebound/guarding; no hepatosplenomegaly; no hernia palpated  LYMPHATIC: No cervical LAD or tenderness  SKIN: No rashes or ulcers noted  NEUROLOGIC: CN II-XII intact; sensation intact in LEs b/l to light touch  PSYCHIATRIC: A+O x 3; mood and affect appropriate; appropriate insight and judgment

## 2023-08-07 NOTE — CONSULT NOTE ADULT - SUBJECTIVE AND OBJECTIVE BOX
[facility] Consult Note Nephrology - CONSULTATION NOTE    58yoM hx HTN, ESRD on HD M, W, F, GERD presents with increasing sob which has been worsening over the weekend, +orthopnea (cannot lay down). His last HD session was 8/3 - also came to ED for missed HD sessions.   Denies CP, palpitations.  This is his 4th ED visit for missed HD sessions. Says he is noncompliant with medications.     PAST MEDICAL & SURGICAL HISTORY:  Benign Essential Hypertension      Hypertension      GERD (gastroesophageal reflux disease)      ESRD on hemodialysis      Hyperlipidemia      Vitamin D deficiency      Tobacco use      HTN (hypertension)      HLD (hyperlipidemia)      History of open reduction and internal fixation (ORIF) procedure  Left Leg  1990  Hardware subsequently removed. Year unknown      AV fistula  Left upper arm 2/2006      No significant past surgical history        No Known Allergies    Home Medications Reviewed  Hospital Medications:   MEDICATIONS  (STANDING):  famotidine    Tablet 20 milliGRAM(s) Oral every 48 hours  gabapentin 300 milliGRAM(s) Oral daily  heparin   Injectable 5000 Unit(s) SubCutaneous every 12 hours  hydrALAZINE 100 milliGRAM(s) Oral three times a day  labetalol 200 milliGRAM(s) Oral two times a day  pantoprazole    Tablet 40 milliGRAM(s) Oral before breakfast  sevelamer carbonate 1600 milliGRAM(s) Oral three times a day  simvastatin 20 milliGRAM(s) Oral at bedtime    SOCIAL HISTORY:  Denies ETOh,Smoking,   FAMILY HISTORY:  Family history of chronic renal disease    Family history of lymphoma    Family history of hypertension in mother  HTN      REVIEW OF SYSTEMS:  CONSTITUTIONAL: No weakness, fevers or chills  EYES/ENT: No visual changes;  No vertigo or throat pain   NECK: No pain or stiffness  RESPIRATORY: No cough, wheezing, hemoptysis; No shortness of breath  CARDIOVASCULAR: No chest pain or palpitations.  GASTROINTESTINAL: No abdominal or epigastric pain. No nausea, vomiting, or hematemesis; No diarrhea or constipation. No melena or hematochezia.  GENITOURINARY: No dysuria, frequency, foamy urine, urinary urgency, incontinence or hematuria  NEUROLOGICAL: No numbness or weakness  SKIN: No itching, burning, rashes, or lesions   VASCULAR: No bilateral lower extremity edema.   All other review of systems is negative unless indicated above.    VITALS:  T(F): 96.8 (08-07-23 @ 14:18), Max: 98.5 (08-07-23 @ 10:02)  HR: 86 (08-07-23 @ 14:18)  BP: 193/87 (08-07-23 @ 14:18)  RR: 18 (08-07-23 @ 14:18)  SpO2: 96% (08-07-23 @ 14:18)  Wt(kg): --    Height (cm): 177.8 (08-07 @ 05:55)  Weight (kg): 85.5 (08-07 @ 05:55)  BMI (kg/m2): 27 (08-07 @ 05:55)  BSA (m2): 2.04 (08-07 @ 05:55)  PHYSICAL EXAM:  Constitutional: NAD  HEENT: anicteric sclera, oropharynx clear, MMM  Neck: No JVD  Respiratory: CTAB, no wheezes, rales or rhonchi  Cardiovascular: S1, S2, RRR  Gastrointestinal: BS+, soft, NT/ND  Extremities: No cyanosis or clubbing. No peripheral edema  Neurological: A/O x 3, no focal deficits  Psychiatric: Normal mood, normal affect  : No CVA tenderness. No rubio.   Skin: No rashes  Vascular Access:    LABS:  08-07    140  |  100  |  84<H>  ----------------------------<  94  4.6   |  21<L>  |  18.42<H>    Ca    9.3      07 Aug 2023 06:42  Phos  3.3     08-07  Mg     2.3     08-07    TPro  7.0  /  Alb  3.5  /  TBili  0.3  /  DBili      /  AST  9<L>  /  ALT  <5<L>  /  AlkPhos  100  08-07    Creatinine Trend: 18.42 <--, 18.17 <--, 17.68 <--                        7.5    9.89  )-----------( 224      ( 07 Aug 2023 14:31 )             23.7     Urine Studies:  Urinalysis Basic - ( 07 Aug 2023 06:42 )    Color:  / Appearance:  / SG:  / pH:   Gluc: 94 mg/dL / Ketone:   / Bili:  / Urobili:    Blood:  / Protein:  / Nitrite:    Leuk Esterase:  / RBC:  / WBC    Sq Epi:  / Non Sq Epi:  / Bacteria:         RADIOLOGY & ADDITIONAL STUDIES:                
4 = No assist / stand by assistance

## 2023-08-07 NOTE — ED ADULT NURSE NOTE - OBJECTIVE STATEMENT
58 y.o M AAO4 ambulatory presents to the ED after signing out AMA from the hospital due to respiratory distress and missing dialysis on Friday. Pt states his last dialysis was Thursday when he was in the hospital but his normal days are Monday weds Friday. Pt has left AV fistula that works. Pt Pt denies CP, SOB, HA, vision changes, n/v/d, fevers chills, abdominal pain. Upon assessment, abdomen soft and nontender, +strong peripheral pulses, moving all extremities without difficulty.

## 2023-08-07 NOTE — ED ADULT NURSE REASSESSMENT NOTE - NS ED NURSE REASSESS COMMENT FT1
Updated report called to dialysis RN, dialysis RN states they will draw stat CBC and type and screen. Aware that patient is declining oxygen and continuous pulse ox.

## 2023-08-07 NOTE — ED ADULT NURSE REASSESSMENT NOTE - NS ED NURSE REASSESS COMMENT FT1
Report received from dialysis RN. States patient received 2 hours and 37 minutes of 3 hour dialysis session.

## 2023-08-07 NOTE — ED ADULT NURSE REASSESSMENT NOTE - NS ED NURSE REASSESS COMMENT FT1
Report received from ANDRAE Rg. Patient is A+Ox3, sleeping but easily arousable. Breathing spontaneous and unlabored on 2L NC. Patient NSR on cardiac monitor. Skin warm dry and of color appropriate for ethnicity. Pending CXR and lab results.

## 2023-08-07 NOTE — ED PROVIDER NOTE - CLINICAL SUMMARY MEDICAL DECISION MAKING FREE TEXT BOX
Recommendation Preamble: The following recommendations were made during the visit: Recommendations (Free Text): Use Efudex again x 2-4 weeks Detail Level: Simple PMH of HTN, HLD, ESRD on MWF, GERD c/o 1 day history of SOB. Pt was discharged from [facility] ED AMA 8/3, admitted for acute respiratory failure. Pt left because there was no room provided for him. Otherwise asymptomatic. Denies fevers, chills, nausea, vomiting, dizziness, chest pain, abdominal pain. Pt is anuric.  Benign physical examination. EKG shows peaked T waves, concern for hyperkalemia. Will give calcium gluconate. Pt is speaking in full sentences, protecting airway. Draw labs, and consult nephrology for dialysis (admit patient).

## 2023-08-07 NOTE — ED ADULT NURSE REASSESSMENT NOTE - NS ED NURSE REASSESS COMMENT FT1
Patient states he would like to leave, states "I'm only here for dialysis and I don't want to wait." KIERAN Desouza made aware, per KIERAN Desouza ok to removed peripheral IV, states she will come speak with patient and complete AMA paperwork. While PA is speaking with patient, transport arrived for transportation to dialysis. Patient agreeable to go to dialysis. PA aware that patient does not have peripheral IV, states patient is ok to go to dialysis without peripheral IV. MARTINA Mcmillan and dialysis RN made aware.

## 2023-08-07 NOTE — H&P ADULT - TIME BILLING
Time spent reviewing outpatient/inpatient chart, performing history and physical, medication reconciliation, medical decision making, ordering labs/tests, discussion with consultants (renal), discussion with patient at bedside, and documentation.

## 2023-08-07 NOTE — ED PROVIDER NOTE - OBJECTIVE STATEMENT
PMH of HTN, HLD, ESRD on MWF, GERD c/o 1 day history of SOB. Pt was discharged from [facility] ED AMA 8/3, admitted for acute respiratory failure. Pt left because there was no room provided for him. Otherwise asymptomatic. Denies fevers, chills, nausea, vomiting, dizziness, chest pain, abdominal pain. Pt is anuric.

## 2023-08-07 NOTE — CONSULT NOTE ADULT - ASSESSMENT
58yoM hx HTN, ESRD on HD M, W, F, GERD presents with increasing sob which has been worsening over the weekend, +orthopnea (cannot lay down). His last HD session was 8/3 - also came to ED for missed HD sessions.         1) ESRD on HD-   HD center- [facility]  MWF  Access: left upper ext avf  Consent in chart  Plan for HD today--> 2k bath and uf 3kg as tolerated  trend bmp      2) Anemia in CKD  hgb below goal  epo 20k tiw with hd  trend hgb    Dr Corral  [phone]

## 2023-08-07 NOTE — H&P ADULT - NSHPLABSRESULTS_GEN_ALL_CORE
7.3    9.30  )-----------( 213      ( 07 Aug 2023 06:42 )             23.6       08-07    140  |  100  |  84<H>  ----------------------------<  94  4.6   |  21<L>  |  18.42<H>    Ca    9.3      07 Aug 2023 06:42  Phos  3.3     08-07  Mg     2.3     08-07    TPro  7.0  /  Alb  3.5  /  TBili  0.3  /  DBili  x   /  AST  9<L>  /  ALT  <5<L>  /  AlkPhos  100  08-07      PT/INR - ( 07 Aug 2023 06:42 )   PT: 12.2 sec;   INR: 1.17 ratio         PTT - ( 07 Aug 2023 06:42 )  PTT:34.9 sec          06:33 - VBG - pH: 7.40  | pCO2: 41    | pO2: 27    | Lactate: 0.9        Urinalysis Basic - ( 07 Aug 2023 06:42 )    Color: x / Appearance: x / SG: x / pH: x  Gluc: 94 mg/dL / Ketone: x  / Bili: x / Urobili: x   Blood: x / Protein: x / Nitrite: x   Leuk Esterase: x / RBC: x / WBC x   Sq Epi: x / Non Sq Epi: x / Bacteria: x        EKG:   CXR:     Imaging:     *** Wells Score  *** ABDELRAHMAN score: determines risk for ACS with unstable angina or NSTEMI  *** MELD-Na  *** Discriminant Function Score

## 2023-08-07 NOTE — H&P ADULT - PROBLEM SELECTOR PLAN 3
Anemia - hg 7.3 - check type and screen, repeat hg - likely needs 1 unit with HD  Will need EPO - on 20,000 TID with HD - to be ordered by renal

## 2023-08-07 NOTE — ED ADULT NURSE REASSESSMENT NOTE - NS ED NURSE REASSESS COMMENT FT1
Patient declining echo exam, also repeatedly taking off pulse ox and nasal cannula. Patient stating "I am only here for dialysis." KIERAN Desouza made aware of pulse ox fo 90-92% on room air and that patient is declining continuous pulse ox and nasal cannula. No RN interventions required.

## 2023-08-07 NOTE — H&P ADULT - PROBLEM SELECTOR PLAN 1
Acute resp failure with hypoxia due to fluid overload due to missed HD sessions, HFpEF  BNP 63,866  - patient for HD today (d/w Dr. Corral - who is aware of sob)  - c/w oxygen supplementation 2L NC to keep oxygen saturation >94% Acute resp failure with hypoxia due to fluid overload due to missed HD sessions, HFpEF  BNP 63,866  - patient for HD today (d/w Dr. Corral - who is aware of sob)  - c/w oxygen supplementation 2L NC to keep oxygen saturation >94%  TTE ordered

## 2023-08-07 NOTE — H&P ADULT - PROBLEM SELECTOR PLAN 2
4th ED admission for missed dialysis  - for urgent HD today  - Will need to discuss issues for noncompliance  - EKG with ?peaked K, although normal on BMP - calcium gluconate needed, HD today  d/w Dr. Corral (nephro) regarding HD  c/w evelynagel

## 2023-08-07 NOTE — H&P ADULT - HISTORY OF PRESENT ILLNESS
58yoM hx HTN, ESRD on HD M, W, F, GERD presents with increasing sob which has been worsening over the weekend, +orthopnea (cannot lay down). His last HD session was 8/3 - also came to ED for missed HD sessions.   Denies CP, palpitations.  This is his 4th ED visit for missed HD sessions. Says he is noncompliant with medications.

## 2023-08-07 NOTE — ED ADULT NURSE NOTE - NS ED NURSE ELOPE COMMENTS
Patient multiple times stated he wanted to leave and did not want to stay. Stated "I am only here for dialysis" multiple times.

## 2023-08-09 ENCOUNTER — EMERGENCY (EMERGENCY)
Facility: HOSPITAL | Age: 58
LOS: 1 days | Discharge: LEFT BEFORE DISCHARGE | End: 2023-08-09
Attending: STUDENT IN AN ORGANIZED HEALTH CARE EDUCATION/TRAINING PROGRAM | Admitting: HOSPITALIST
Payer: MEDICAID

## 2023-08-09 VITALS
DIASTOLIC BLOOD PRESSURE: 92 MMHG | TEMPERATURE: 98 F | HEIGHT: 70 IN | WEIGHT: 169.76 LBS | RESPIRATION RATE: 18 BRPM | SYSTOLIC BLOOD PRESSURE: 176 MMHG | HEART RATE: 84 BPM | OXYGEN SATURATION: 97 %

## 2023-08-09 VITALS — DIASTOLIC BLOOD PRESSURE: 79 MMHG | OXYGEN SATURATION: 99 % | HEART RATE: 86 BPM | SYSTOLIC BLOOD PRESSURE: 165 MMHG

## 2023-08-09 DIAGNOSIS — I77.0 ARTERIOVENOUS FISTULA, ACQUIRED: Chronic | ICD-10-CM

## 2023-08-09 DIAGNOSIS — N18.6 END STAGE RENAL DISEASE: ICD-10-CM

## 2023-08-09 DIAGNOSIS — Z98.89 OTHER SPECIFIED POSTPROCEDURAL STATES: Chronic | ICD-10-CM

## 2023-08-09 LAB
ALBUMIN SERPL ELPH-MCNC: 3.5 G/DL — SIGNIFICANT CHANGE UP (ref 3.3–5)
ALP SERPL-CCNC: 108 U/L — SIGNIFICANT CHANGE UP (ref 40–120)
ALT FLD-CCNC: <5 U/L — LOW (ref 10–45)
ANION GAP SERPL CALC-SCNC: 19 MMOL/L — HIGH (ref 5–17)
AST SERPL-CCNC: 11 U/L — SIGNIFICANT CHANGE UP (ref 10–40)
BASOPHILS # BLD AUTO: 0.04 K/UL — SIGNIFICANT CHANGE UP (ref 0–0.2)
BASOPHILS NFR BLD AUTO: 0.5 % — SIGNIFICANT CHANGE UP (ref 0–2)
BILIRUB SERPL-MCNC: 0.3 MG/DL — SIGNIFICANT CHANGE UP (ref 0.2–1.2)
BUN SERPL-MCNC: 65 MG/DL — HIGH (ref 7–23)
CALCIUM SERPL-MCNC: 8.8 MG/DL — SIGNIFICANT CHANGE UP (ref 8.4–10.5)
CHLORIDE SERPL-SCNC: 97 MMOL/L — SIGNIFICANT CHANGE UP (ref 96–108)
CO2 SERPL-SCNC: 24 MMOL/L — SIGNIFICANT CHANGE UP (ref 22–31)
CREAT SERPL-MCNC: 14 MG/DL — HIGH (ref 0.5–1.3)
EGFR: 4 ML/MIN/1.73M2 — LOW
EOSINOPHIL # BLD AUTO: 0.28 K/UL — SIGNIFICANT CHANGE UP (ref 0–0.5)
EOSINOPHIL NFR BLD AUTO: 3.5 % — SIGNIFICANT CHANGE UP (ref 0–6)
GAS PNL BLDV: SIGNIFICANT CHANGE UP
GLUCOSE SERPL-MCNC: 120 MG/DL — HIGH (ref 70–99)
HCT VFR BLD CALC: 24.9 % — LOW (ref 39–50)
HGB BLD-MCNC: 7.8 G/DL — LOW (ref 13–17)
IMM GRANULOCYTES NFR BLD AUTO: 0.5 % — SIGNIFICANT CHANGE UP (ref 0–0.9)
LYMPHOCYTES # BLD AUTO: 1.26 K/UL — SIGNIFICANT CHANGE UP (ref 1–3.3)
LYMPHOCYTES # BLD AUTO: 15.9 % — SIGNIFICANT CHANGE UP (ref 13–44)
MAGNESIUM SERPL-MCNC: 2 MG/DL — SIGNIFICANT CHANGE UP (ref 1.6–2.6)
MCHC RBC-ENTMCNC: 29.2 PG — SIGNIFICANT CHANGE UP (ref 27–34)
MCHC RBC-ENTMCNC: 31.3 GM/DL — LOW (ref 32–36)
MCV RBC AUTO: 93.3 FL — SIGNIFICANT CHANGE UP (ref 80–100)
MONOCYTES # BLD AUTO: 1.12 K/UL — HIGH (ref 0–0.9)
MONOCYTES NFR BLD AUTO: 14.1 % — HIGH (ref 2–14)
NEUTROPHILS # BLD AUTO: 5.19 K/UL — SIGNIFICANT CHANGE UP (ref 1.8–7.4)
NEUTROPHILS NFR BLD AUTO: 65.5 % — SIGNIFICANT CHANGE UP (ref 43–77)
NRBC # BLD: 0 /100 WBCS — SIGNIFICANT CHANGE UP (ref 0–0)
NT-PROBNP SERPL-SCNC: HIGH PG/ML (ref 0–300)
PLATELET # BLD AUTO: 198 K/UL — SIGNIFICANT CHANGE UP (ref 150–400)
POTASSIUM SERPL-MCNC: 4.1 MMOL/L — SIGNIFICANT CHANGE UP (ref 3.5–5.3)
POTASSIUM SERPL-SCNC: 4.1 MMOL/L — SIGNIFICANT CHANGE UP (ref 3.5–5.3)
PROT SERPL-MCNC: 7.2 G/DL — SIGNIFICANT CHANGE UP (ref 6–8.3)
RBC # BLD: 2.67 M/UL — LOW (ref 4.2–5.8)
RBC # FLD: 17.3 % — HIGH (ref 10.3–14.5)
SODIUM SERPL-SCNC: 140 MMOL/L — SIGNIFICANT CHANGE UP (ref 135–145)
TROPONIN T, HIGH SENSITIVITY RESULT: 89 NG/L — HIGH (ref 0–51)
WBC # BLD: 7.93 K/UL — SIGNIFICANT CHANGE UP (ref 3.8–10.5)
WBC # FLD AUTO: 7.93 K/UL — SIGNIFICANT CHANGE UP (ref 3.8–10.5)

## 2023-08-09 PROCEDURE — 71045 X-RAY EXAM CHEST 1 VIEW: CPT | Mod: 26

## 2023-08-09 PROCEDURE — 99285 EMERGENCY DEPT VISIT HI MDM: CPT

## 2023-08-09 NOTE — ED ADULT NURSE NOTE - OBJECTIVE STATEMENT
Pt is a 58 y.o male presenting to ED for dialysis treatment. PT is A&Ox3 and resting in stretcher. Pt receives dialysis MWF outpatient. PT was seen on 8/7/2023 and received dialysis and "liked it here much better and wanted to come back for dialysis". CM NSR, spontaneously breathing on RA >95%.  Pt denies any CP, SOB, fever, chills, dysuria, hematuria. Safety and comfort measures in place bed in lowest position, siderails upright and blanket given.

## 2023-08-09 NOTE — ED ADULT NURSE NOTE - NS_ED_FAMBEDSIDE4_ED_ALL_ED
[As Noted in HPI] : as noted in HPI [Easy Bruising] : a tendency for easy bruising [Negative] : Endocrine [Chest Pain] : no chest pain [Shortness Of Breath] : no shortness of breath [Easy Bleeding] : no tendency for easy bleeding [FreeTextEntry9] : joint pain MD Shelby

## 2023-08-09 NOTE — ED PROVIDER NOTE - ATTENDING CONTRIBUTION TO CARE
hx of esrd lastdialysis monday here fordialysis  didn't want to go to his center,  today is regular scheduled day-  prior hospital stay for overload,  left AMA now here forr dialysis  no cp, no sob no nausea no vomiting, no confusion, on exam, has graft in the LUE, no overlying erythema nonlabored respirations, planfor labs and likely nehprology c/s for coordrination of dialysis

## 2023-08-09 NOTE — ED PROVIDER NOTE - PHYSICAL EXAMINATION
Gen: AAOx3, non-toxic  Head: NCAT  HEENT: EOMI, oral mucosa moist, normal conjunctiva  Lung: CTAB, no respiratory distress, no wheezes/rhonchi/rales B/L,   CV: RRR, no murmurs, rubs or gallops  Abd: soft, NTND, no guarding, no CVA tenderness  MSK: no visible deformities. DIalysis fistula present on right arm  Neuro: No focal sensory or motor deficits  Skin: Warm, well perfused, no rash  Psych: normal affect.

## 2023-08-09 NOTE — ED ADULT NURSE NOTE - ED STAT RN HANDOFF DETAILS
report received from ANDRAE Martinez, patient eloped prior to sign out. Spoke with MAR who is aware that patient eloped.

## 2023-08-09 NOTE — ED PROVIDER NOTE - PROGRESS NOTE DETAILS
Gautam Schuler MD:  Discussed with patient's nephrologist, will admit for dialysis, discussed with hospitalist. Gautam Schuler MD:  Patient eloped after >2 hrs admission, IV removed for safety, recommended staying for evaluation by [facility] and nephrology, notified hospitalist of elopement.

## 2023-08-09 NOTE — ED ADULT NURSE NOTE - BEFAST FACE
"ED Provider Note    Scribed for Dex Taylor M.D. by Roberto Kelley. 2017  2:21 PM    Primary care provider: Pcp Pt States None  Means of arrival: walk-in  History obtained from: patient  History limited by: none    CHIEF COMPLAINT  Chief Complaint   Patient presents with   • N/V       HPI  Caroline Jorgensen is a 23 y.o. female who presents to the Emergency Department complaining of worsening nausea and vomiting, onset 2 days ago, with associated mild abdominal pain. She believes she is vomiting bile. Patient notes she becomes dizzy when she walks. She denies fever and vaginal bleeding.  A1. Patient has a prescription of Zofran which she ran out of. Initially this medication alleviated her vomiting, but has not been effective lately.    She reports she has followed up with Dr. Cortés (OBGYN).     REVIEW OF SYSTEMS  Pertinent positives include nausea, vomiting, dizziness. Pertinent negatives include no fever, vaginal bleeding.  All other systems reviewed and negative. C.     PAST MEDICAL HISTORY  No pertinent medical history.     SURGICAL HISTORY  patient denies any surgical history    SOCIAL HISTORY  Social History   Substance Use Topics   • Smoking status: Never Smoker    • Smokeless tobacco: None   • Alcohol Use: No      History   Drug Use No       FAMILY HISTORY  No pertinent family history.     CURRENT MEDICATIONS  Home Medications     Reviewed by Itzel Zarate R.N. (Registered Nurse) on 17 at 1341  Med List Status: Complete    Medication Last Dose Status          Patient Tiago Taking any Medications                        ALLERGIES  No Known Allergies    PHYSICAL EXAM  VITAL SIGNS: /72 mmHg  Pulse 87  Temp(Src) 37.7 °C (99.9 °F)  Resp 16  Ht 1.6 m (5' 3\")  Wt 73.8 kg (162 lb 11.2 oz)  BMI 28.83 kg/m2  SpO2 99%  LMP 2017    Constitutional: Well developed, Well nourished, mild distress, Non-toxic appearance.   HENT: Normocephalic, Atraumatic, Bilateral external ears " normal, slightly dry mucus mucus membranes. No oral exudates.   Eyes: PERRLA, EOMI, Conjunctiva normal, No discharge.   Neck: No tenderness, Supple, No stridor.   Lymphatic: No lymphadenopathy noted.   Cardiovascular: Slightly tachycardic. Normal rhythm.   Thorax & Lungs: Clear to auscultation bilaterally, No respiratory distress, No wheezing, No crackles.   Abdomen: Soft, No tenderness, No masses, No pulsatile masses.   Skin: Warm, Dry, No erythema, No rash.   Extremities:, No edema No cyanosis.   Musculoskeletal: No tenderness to palpation or major deformities noted.  Intact distal pulses  Neurologic: Awake, alert. Moves all extremities spontaneously.  Psychiatric: Affect normal, Judgment normal, Mood normal.     LABS  Labs Reviewed   COMP METABOLIC PANEL - Abnormal; Notable for the following:     Sodium 134 (*)     Potassium 3.4 (*)     Globulin 3.6 (*)     All other components within normal limits   ESTIMATED GFR   All labs reviewed by me.    ADIOLOGY  No orders to display     The radiologist's interpretation of all radiological studies have been reviewed by me.    COURSE & MEDICAL DECISION MAKING  Pertinent Labs & Imaging studies reviewed. 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 had a repeate queant beta on the 10th which had doubled     2:21 PM - Patient seen and examined at bedside. Patient will be treated with IV fluids for her nausea, Zofran. Ordered CMP to evaluate her symptoms.  Recommended using Vitamin B-6 and Unisome for any future nausea. Will perform blood test and reevaluate.     Decision Making:  Patient with pregnancy-induced nausea vomiting, the patient appears mildly dehydrated, electrolyte unremarkable, give the patient a liter of fluid, a sensation Zofran. We'll give patient prescription for Zofran and Ticlid just, have the patient follow-up with  working as an  outpatient, return with any other concerns.     The patient will return for new or worsening symptoms and is stable at the time of discharge.    The patient is referred to a primary physician for blood pressure management, diabetic screening, and for all other preventative health concerns.    DISPOSITION:  Patient will be discharged home in stable condition.    FOLLOW UP:  [facility], Emergency Dept  [address]  [place] [id number] [phone]    If symptoms worsen    Sherri Cortés M.D.  [address]  [facility] [address]  [phone]            OUTPATIENT MEDICATIONS:  Discharge Medication List as of 7/18/2017  4:03 PM      START taking these medications    Details   ondansetron (ZOFRAN ODT) 4 MG TABLET DISPERSIBLE Take 1 Tab by mouth every 8 hours as needed for Nausea/Vomiting., Disp-20 Tab, R-0, Print Rx Paper      Doxylamine-Pyridoxine (DICLEGIS) 10-10 MG Tablet Delayed Response delayed-release tablet Take 2 Tabs by mouth every bedtime., Disp-60 Tab, R-0, Print Rx Paper                 FINAL IMPRESSION  1. Nausea and vomiting, intractability of vomiting not specified, unspecified vomiting type    2. Pregnancy, unspecified gestational age          Roberto PINEDA (Scribe), am scribing for, and in the presence of, Dex Taylor M.D..    Electronically signed by: Rboerto Kelley (Scribcarlos), 7/18/2017    IDex M.D. personally performed the services described in this documentation, as scribed by Roberto Kelley in my presence, and it is both accurate and complete.    The note accurately reflects work and decisions made by me.  Dex Taylor  7/18/2017  4:33 PM             No

## 2023-08-09 NOTE — ED PROVIDER NOTE - OBJECTIVE STATEMENT
58yoM hx HTN, ESRD on HD M, W, F, GERD presents to ED requesting dialysis. Pt states that he "never wants to go back" to his dialysis center in "[facility]". Pt states he usually gets his dialysis treatments M/W/F 58yoM hx HTN, ESRD on HD M, W, F, GERD presents to ED requesting dialysis. Pt states that he "never wants to go back" to his dialysis center in "[facility]". Pt states he usually gets his dialysis treatments M/W/F. Pt came to the ED on Monday for dialysis treatment. Pt has no active complaints at this moment. Denies fevers, chills, nausea, vomiting, dizziness, chest pain, SOB, abdominal pain, dysuria, hematuria. 58yoM hx HTN, ESRD on HD M, W, F, GERD presents to ED requesting dialysis. Pt states that he "never wants to go back" to his dialysis center in "[facility]". Pt states he usually gets his dialysis treatments M/W/F. Pt came to the ED on Monday for dialysis treatment. Pt has no active complaints at this moment. Pt is anuric. Denies fevers, chills, nausea, vomiting, dizziness, chest pain, SOB, abdominal pain, dysuria, hematuria. 58yoM hx HTN, ESRD on HD M, W, F, GERD presents to ED requesting dialysis. Pt states that he "never wants to go back" to his dialysis center in "[facility]". Pt states he usually gets his dialysis treatments M/W/F. Pt came to the ED on Monday for dialysis treatment. Pt has no active complaints at this moment. Pt is anuric. Has hemodialysis flowsheet with him. Denies fevers, chills, nausea, vomiting, dizziness, chest pain, SOB, abdominal pain, dysuria, hematuria.

## 2023-08-09 NOTE — ED ADULT NURSE REASSESSMENT NOTE - NS ED NURSE REASSESS COMMENT FT1
Report received from ANDRAE Costa. Pt A&Ox4, breathing spontaneously and unlabored, speaking full sentences, moving all extremities easily, is ambulatory. Pt declines shortness of breath or chest pain at this time. Appropriate safety measures in place, stretcher locked in lowest position.

## 2023-08-09 NOTE — CHART NOTE - NSCHARTNOTEFT_GEN_A_CORE
Left 1 message for patient in regards to follow up care with callback information.
58 year old M with hypertension, hyperlipidemia, ESRD on M/W/F via LUE fistula presenting with shortness of breath x2 days.  Patient last had dialysis on Saturday.  When asked why he missed dialysis, patient replied that he does not want to be stuck by anyone.  Patient does not have any chest pain.  No lower extremity edema.  Denies fever/chills, abdominal pain, headache, nausea/vomiting/diarrhea, dark or bloody stools.  Of note, per chart review, patient was seen in ED 7/22/23 for similar complaint of SOB in setting of missed dialysis, but eloped.    CC: Pt with respiratory failure, refusing Bipap, placed on 4L NC satting 90's. Refusing IV placement, refusing CXR s/p HD, refusing TTE, and further workup/admission. Pt left AMA prior       Vital Signs Last 24 Hrs  T(C): 36.6 (03 Aug 2023 17:18), Max: 36.9 (03 Aug 2023 03:50)  T(F): 97.8 (03 Aug 2023 17:18), Max: 98.4 (03 Aug 2023 03:50)  HR: 88 (03 Aug 2023 17:18) (80 - 95)  BP: 163/81 (03 Aug 2023 17:18) (163/81 - 189/75)  BP(mean): 109 (03 Aug 2023 17:18) (98 - 109)  RR: 18 (03 Aug 2023 17:18) (16 - 22)  SpO2: 92% (03 Aug 2023 17:25) (92% - 100%)    Parameters below as of 03 Aug 2023 17:25  Patient On (Oxygen Delivery Method): room air      Labs:             7.5    10.75 )-----------( 202      ( 03 Aug 2023 04:07 )             24.6     08-03    141  |  101  |  80<H>  ----------------------------<  90  5.0   |  20<L>  |  18.17<H>    Ca    9.3      03 Aug 2023 05:43  Phos  3.0     08-03  Mg     2.2     08-03    TPro  7.1  /  Alb  3.5  /  TBili  0.4  /  DBili  x   /  AST  13  /  ALT  5<L>  /  AlkPhos  107  08-03          Plan of care:  Explanation of the Risk and Benefits of Leaving AMA was explained to patient.  Patient A+ O x 3 and verbalized full understanding of risk vs benefits including fluid overload, respiratory failure, and sudden death. Patient educated on importance of not skipping HD sessions and to follow up with pulmonology and nephrology outpatient. Follow up with your primary MD immediately upon discharge.     Itzel Avilez NP     Date/Time patient left AMA:
Left message for patient in regards to follow up care with callback information.
Patient requested call back on 8/8.

## 2023-08-09 NOTE — ED ADULT NURSE REASSESSMENT NOTE - NS ED NURSE REASSESS COMMENT FT1
Pt wishes to speak to , RN called ED social work team who informed pt he will receive  on destination hospital unit as pt is now admitted.     Pt expresses desire to leave as he does not want to wait for dialysis, pt informed of need and importance of dialysis treatment by both RN and MD Shelby. Pt insisting on leaving ED, becoming agitated. Pt's IV removed for pt safety.     1031 Pt eloped from ED.

## 2023-08-09 NOTE — ED PROVIDER NOTE - NS ED ROS FT
GENERAL: Denies weight loss, fever and chills.  EYES: denies change in vision,   HEENT: denies trouble swallowing or speaking,   CARDIAC: denies chest pain or palpitations   PULMONARY: denies cough or SOB,  GI: denies abdominal pain, no nausea, no vomiting, no diarrhea or constipation,   : Denies dysuria and urinary frequency, hematuria   SKIN: denies rashes,   NEURO: denies headache, dizziness  MSK: Denies myalgia and joint pain.  PSYCHIATRIC: Denies recent changes in mood. Denies anxiety and depression.    All other ROS negative unless otherwise specified in HPI.

## 2023-08-09 NOTE — ED ADULT NURSE REASSESSMENT NOTE - NS ED NURSE REASSESS COMMENT FT1
MAR notified that patient eloped prior to sign out from previous RN. Flow RN Malou also made aware. MAR states he informed hospitalist and team that would have been caring for the patient.

## 2023-08-12 PROCEDURE — 82947 ASSAY GLUCOSE BLOOD QUANT: CPT

## 2023-08-12 PROCEDURE — 84295 ASSAY OF SERUM SODIUM: CPT

## 2023-08-12 PROCEDURE — 84132 ASSAY OF SERUM POTASSIUM: CPT

## 2023-08-12 PROCEDURE — 83735 ASSAY OF MAGNESIUM: CPT

## 2023-08-12 PROCEDURE — 83605 ASSAY OF LACTIC ACID: CPT

## 2023-08-12 PROCEDURE — 36415 COLL VENOUS BLD VENIPUNCTURE: CPT

## 2023-08-12 PROCEDURE — 85018 HEMOGLOBIN: CPT

## 2023-08-12 PROCEDURE — 82803 BLOOD GASES ANY COMBINATION: CPT

## 2023-08-12 PROCEDURE — 85014 HEMATOCRIT: CPT

## 2023-08-12 PROCEDURE — 99285 EMERGENCY DEPT VISIT HI MDM: CPT | Mod: 25

## 2023-08-12 PROCEDURE — 82330 ASSAY OF CALCIUM: CPT

## 2023-08-12 PROCEDURE — 85025 COMPLETE CBC W/AUTO DIFF WBC: CPT

## 2023-08-12 PROCEDURE — 80053 COMPREHEN METABOLIC PANEL: CPT

## 2023-08-12 PROCEDURE — 71045 X-RAY EXAM CHEST 1 VIEW: CPT

## 2023-08-12 PROCEDURE — 82435 ASSAY OF BLOOD CHLORIDE: CPT

## 2023-08-12 PROCEDURE — 84484 ASSAY OF TROPONIN QUANT: CPT

## 2023-08-12 PROCEDURE — 83880 ASSAY OF NATRIURETIC PEPTIDE: CPT

## 2023-08-14 NOTE — CHART NOTE - NSCHARTNOTEFT_GEN_A_CORE
Patient who is A&O x 4 with h/o ESRD received dialysis and left without signing his AMA form. IV was previously removed. Patient refused Echo. He did receive next HD session information and flow sheet
Addendum - patient was admitted due to acute hypoxic respiratory failure with hypoxia due to acute on chronic HFpEF and missed HD sessions.
Following HD - patient expressed desire to leave the hospital. KIERAN Desouza discussed with patient, stated risks of leaving prematurely given anemia and fluid overload. Patient states sob has resolved. Feels ok.   This is the 3rd ED visit for missing HD. Patient refused transfusion. Refused further work up and management.   Information to continue HD sent over to patient's HD facility who confirmed receipt.   Patient will sign out AMA. Seems to have insight into consequences of leaving - understands risks of clinical deterioration/decline. Told to return to the ED immediately if has sob, dizziness, chest pain.

## 2023-08-22 ENCOUNTER — TRANSCRIPTION ENCOUNTER (OUTPATIENT)
Age: 58
End: 2023-08-22

## 2023-08-22 ENCOUNTER — INPATIENT (INPATIENT)
Facility: HOSPITAL | Age: 58
LOS: 0 days | Discharge: LEFT BEFORE DISCHARGE | DRG: 682 | End: 2023-08-22
Attending: HOSPITALIST | Admitting: HOSPITALIST
Payer: MEDICAID

## 2023-08-22 VITALS
DIASTOLIC BLOOD PRESSURE: 86 MMHG | RESPIRATION RATE: 17 BRPM | TEMPERATURE: 98 F | HEART RATE: 86 BPM | SYSTOLIC BLOOD PRESSURE: 163 MMHG | OXYGEN SATURATION: 94 %

## 2023-08-22 VITALS
SYSTOLIC BLOOD PRESSURE: 176 MMHG | WEIGHT: 182.98 LBS | TEMPERATURE: 98 F | RESPIRATION RATE: 18 BRPM | HEIGHT: 70 IN | HEART RATE: 93 BPM | DIASTOLIC BLOOD PRESSURE: 96 MMHG | OXYGEN SATURATION: 95 %

## 2023-08-22 DIAGNOSIS — N18.6 END STAGE RENAL DISEASE: ICD-10-CM

## 2023-08-22 DIAGNOSIS — J96.01 ACUTE RESPIRATORY FAILURE WITH HYPOXIA: ICD-10-CM

## 2023-08-22 DIAGNOSIS — I77.0 ARTERIOVENOUS FISTULA, ACQUIRED: Chronic | ICD-10-CM

## 2023-08-22 DIAGNOSIS — I10 ESSENTIAL (PRIMARY) HYPERTENSION: ICD-10-CM

## 2023-08-22 DIAGNOSIS — D63.8 ANEMIA IN OTHER CHRONIC DISEASES CLASSIFIED ELSEWHERE: ICD-10-CM

## 2023-08-22 DIAGNOSIS — R10.9 UNSPECIFIED ABDOMINAL PAIN: ICD-10-CM

## 2023-08-22 DIAGNOSIS — I24.8 OTHER FORMS OF ACUTE ISCHEMIC HEART DISEASE: ICD-10-CM

## 2023-08-22 DIAGNOSIS — Z98.89 OTHER SPECIFIED POSTPROCEDURAL STATES: Chronic | ICD-10-CM

## 2023-08-22 LAB
ALBUMIN SERPL ELPH-MCNC: 3.1 G/DL — LOW (ref 3.3–5)
ALP SERPL-CCNC: 105 U/L — SIGNIFICANT CHANGE UP (ref 40–120)
ALT FLD-CCNC: <5 U/L — LOW (ref 10–45)
ANION GAP SERPL CALC-SCNC: 17 MMOL/L — SIGNIFICANT CHANGE UP (ref 5–17)
ANISOCYTOSIS BLD QL: SLIGHT — SIGNIFICANT CHANGE UP
APTT BLD: 32.7 SEC — SIGNIFICANT CHANGE UP (ref 24.5–35.6)
AST SERPL-CCNC: 12 U/L — SIGNIFICANT CHANGE UP (ref 10–40)
BASOPHILS # BLD AUTO: 0.07 K/UL — SIGNIFICANT CHANGE UP (ref 0–0.2)
BASOPHILS NFR BLD AUTO: 0.9 % — SIGNIFICANT CHANGE UP (ref 0–2)
BILIRUB SERPL-MCNC: 0.4 MG/DL — SIGNIFICANT CHANGE UP (ref 0.2–1.2)
BLD GP AB SCN SERPL QL: NEGATIVE — SIGNIFICANT CHANGE UP
BUN SERPL-MCNC: 72 MG/DL — HIGH (ref 7–23)
CALCIUM SERPL-MCNC: 8.6 MG/DL — SIGNIFICANT CHANGE UP (ref 8.4–10.5)
CHLORIDE SERPL-SCNC: 99 MMOL/L — SIGNIFICANT CHANGE UP (ref 96–108)
CO2 SERPL-SCNC: 22 MMOL/L — SIGNIFICANT CHANGE UP (ref 22–31)
CREAT SERPL-MCNC: 14.16 MG/DL — HIGH (ref 0.5–1.3)
EGFR: 4 ML/MIN/1.73M2 — LOW
ELLIPTOCYTES BLD QL SMEAR: SLIGHT — SIGNIFICANT CHANGE UP
EOSINOPHIL # BLD AUTO: 0.15 K/UL — SIGNIFICANT CHANGE UP (ref 0–0.5)
EOSINOPHIL NFR BLD AUTO: 1.8 % — SIGNIFICANT CHANGE UP (ref 0–6)
GIANT PLATELETS BLD QL SMEAR: PRESENT — SIGNIFICANT CHANGE UP
GLUCOSE SERPL-MCNC: 113 MG/DL — HIGH (ref 70–99)
HCT VFR BLD CALC: 21.2 % — LOW (ref 39–50)
HGB BLD-MCNC: 6.7 G/DL — CRITICAL LOW (ref 13–17)
HYPOCHROMIA BLD QL: SLIGHT — SIGNIFICANT CHANGE UP
INR BLD: 1.12 RATIO — SIGNIFICANT CHANGE UP (ref 0.85–1.18)
LYMPHOCYTES # BLD AUTO: 0.84 K/UL — LOW (ref 1–3.3)
LYMPHOCYTES # BLD AUTO: 10.4 % — LOW (ref 13–44)
MANUAL SMEAR VERIFICATION: SIGNIFICANT CHANGE UP
MCHC RBC-ENTMCNC: 29.1 PG — SIGNIFICANT CHANGE UP (ref 27–34)
MCHC RBC-ENTMCNC: 31.6 GM/DL — LOW (ref 32–36)
MCV RBC AUTO: 92.2 FL — SIGNIFICANT CHANGE UP (ref 80–100)
MONOCYTES # BLD AUTO: 0.63 K/UL — SIGNIFICANT CHANGE UP (ref 0–0.9)
MONOCYTES NFR BLD AUTO: 7.8 % — SIGNIFICANT CHANGE UP (ref 2–14)
NEUTROPHILS # BLD AUTO: 6.41 K/UL — SIGNIFICANT CHANGE UP (ref 1.8–7.4)
NEUTROPHILS NFR BLD AUTO: 79.1 % — HIGH (ref 43–77)
OVALOCYTES BLD QL SMEAR: SLIGHT — SIGNIFICANT CHANGE UP
PLAT MORPH BLD: NORMAL — SIGNIFICANT CHANGE UP
PLATELET # BLD AUTO: 117 K/UL — LOW (ref 150–400)
POIKILOCYTOSIS BLD QL AUTO: SLIGHT — SIGNIFICANT CHANGE UP
POLYCHROMASIA BLD QL SMEAR: SLIGHT — SIGNIFICANT CHANGE UP
POTASSIUM SERPL-MCNC: 4.4 MMOL/L — SIGNIFICANT CHANGE UP (ref 3.5–5.3)
POTASSIUM SERPL-SCNC: 4.4 MMOL/L — SIGNIFICANT CHANGE UP (ref 3.5–5.3)
PROT SERPL-MCNC: 7 G/DL — SIGNIFICANT CHANGE UP (ref 6–8.3)
PROTHROM AB SERPL-ACNC: 12.3 SEC — SIGNIFICANT CHANGE UP (ref 9.5–13)
RBC # BLD: 2.3 M/UL — LOW (ref 4.2–5.8)
RBC # FLD: 17.1 % — HIGH (ref 10.3–14.5)
RBC BLD AUTO: ABNORMAL
RH IG SCN BLD-IMP: POSITIVE — SIGNIFICANT CHANGE UP
SCHISTOCYTES BLD QL AUTO: SLIGHT — SIGNIFICANT CHANGE UP
SODIUM SERPL-SCNC: 138 MMOL/L — SIGNIFICANT CHANGE UP (ref 135–145)
TARGETS BLD QL SMEAR: SLIGHT — SIGNIFICANT CHANGE UP
TROPONIN T, HIGH SENSITIVITY RESULT: 67 NG/L — HIGH (ref 0–51)
TROPONIN T, HIGH SENSITIVITY RESULT: 88 NG/L — HIGH (ref 0–51)
WBC # BLD: 8.1 K/UL — SIGNIFICANT CHANGE UP (ref 3.8–10.5)
WBC # FLD AUTO: 8.1 K/UL — SIGNIFICANT CHANGE UP (ref 3.8–10.5)

## 2023-08-22 PROCEDURE — 86923 COMPATIBILITY TEST ELECTRIC: CPT

## 2023-08-22 PROCEDURE — 74176 CT ABD & PELVIS W/O CONTRAST: CPT | Mod: MA

## 2023-08-22 PROCEDURE — 83880 ASSAY OF NATRIURETIC PEPTIDE: CPT

## 2023-08-22 PROCEDURE — 86900 BLOOD TYPING SEROLOGIC ABO: CPT

## 2023-08-22 PROCEDURE — 71045 X-RAY EXAM CHEST 1 VIEW: CPT | Mod: 26

## 2023-08-22 PROCEDURE — 76705 ECHO EXAM OF ABDOMEN: CPT

## 2023-08-22 PROCEDURE — P9016: CPT

## 2023-08-22 PROCEDURE — 99285 EMERGENCY DEPT VISIT HI MDM: CPT

## 2023-08-22 PROCEDURE — 84484 ASSAY OF TROPONIN QUANT: CPT

## 2023-08-22 PROCEDURE — 36430 TRANSFUSION BLD/BLD COMPNT: CPT

## 2023-08-22 PROCEDURE — 85610 PROTHROMBIN TIME: CPT

## 2023-08-22 PROCEDURE — 86850 RBC ANTIBODY SCREEN: CPT

## 2023-08-22 PROCEDURE — 99261: CPT

## 2023-08-22 PROCEDURE — 85730 THROMBOPLASTIN TIME PARTIAL: CPT

## 2023-08-22 PROCEDURE — 85025 COMPLETE CBC W/AUTO DIFF WBC: CPT

## 2023-08-22 PROCEDURE — 80053 COMPREHEN METABOLIC PANEL: CPT

## 2023-08-22 PROCEDURE — 76705 ECHO EXAM OF ABDOMEN: CPT | Mod: 26

## 2023-08-22 PROCEDURE — 71045 X-RAY EXAM CHEST 1 VIEW: CPT

## 2023-08-22 PROCEDURE — 36415 COLL VENOUS BLD VENIPUNCTURE: CPT

## 2023-08-22 PROCEDURE — 74176 CT ABD & PELVIS W/O CONTRAST: CPT | Mod: 26,MA

## 2023-08-22 PROCEDURE — 99223 1ST HOSP IP/OBS HIGH 75: CPT

## 2023-08-22 PROCEDURE — 86901 BLOOD TYPING SEROLOGIC RH(D): CPT

## 2023-08-22 RX ORDER — GABAPENTIN 400 MG/1
1 CAPSULE ORAL
Qty: 0 | Refills: 0 | DISCHARGE
Start: 2023-08-22

## 2023-08-22 RX ORDER — GABAPENTIN 400 MG/1
1 CAPSULE ORAL
Qty: 0 | Refills: 0 | DISCHARGE

## 2023-08-22 RX ORDER — SEVELAMER CARBONATE 2400 MG/1
2 POWDER, FOR SUSPENSION ORAL
Qty: 0 | Refills: 0 | DISCHARGE
Start: 2023-08-22

## 2023-08-22 RX ORDER — LABETALOL HCL 100 MG
1 TABLET ORAL
Qty: 0 | Refills: 0 | DISCHARGE
Start: 2023-08-22

## 2023-08-22 RX ORDER — LABETALOL HCL 100 MG
200 TABLET ORAL
Refills: 0 | Status: DISCONTINUED | OUTPATIENT
Start: 2023-08-22 | End: 2023-08-22

## 2023-08-22 RX ORDER — PANTOPRAZOLE SODIUM 20 MG/1
40 TABLET, DELAYED RELEASE ORAL
Refills: 0 | Status: DISCONTINUED | OUTPATIENT
Start: 2023-08-22 | End: 2023-08-22

## 2023-08-22 RX ORDER — HYDRALAZINE HCL 50 MG
1 TABLET ORAL
Qty: 0 | Refills: 0 | DISCHARGE
Start: 2023-08-22

## 2023-08-22 RX ORDER — GABAPENTIN 400 MG/1
300 CAPSULE ORAL DAILY
Refills: 0 | Status: DISCONTINUED | OUTPATIENT
Start: 2023-08-22 | End: 2023-08-22

## 2023-08-22 RX ORDER — SIMVASTATIN 20 MG/1
1 TABLET, FILM COATED ORAL
Qty: 0 | Refills: 0 | DISCHARGE

## 2023-08-22 RX ORDER — HYDRALAZINE HCL 50 MG
1 TABLET ORAL
Qty: 0 | Refills: 0 | DISCHARGE

## 2023-08-22 RX ORDER — SEVELAMER CARBONATE 2400 MG/1
1600 POWDER, FOR SUSPENSION ORAL THREE TIMES A DAY
Refills: 0 | Status: DISCONTINUED | OUTPATIENT
Start: 2023-08-22 | End: 2023-08-22

## 2023-08-22 RX ORDER — LABETALOL HCL 100 MG
1 TABLET ORAL
Qty: 0 | Refills: 0 | DISCHARGE

## 2023-08-22 RX ORDER — SIMVASTATIN 20 MG/1
1 TABLET, FILM COATED ORAL
Qty: 0 | Refills: 0 | DISCHARGE
Start: 2023-08-22

## 2023-08-22 RX ORDER — PANTOPRAZOLE SODIUM 20 MG/1
1 TABLET, DELAYED RELEASE ORAL
Qty: 0 | Refills: 0 | DISCHARGE
Start: 2023-08-22

## 2023-08-22 RX ORDER — HYDRALAZINE HCL 50 MG
100 TABLET ORAL THREE TIMES A DAY
Refills: 0 | Status: DISCONTINUED | OUTPATIENT
Start: 2023-08-22 | End: 2023-08-22

## 2023-08-22 RX ORDER — SIMVASTATIN 20 MG/1
20 TABLET, FILM COATED ORAL AT BEDTIME
Refills: 0 | Status: DISCONTINUED | OUTPATIENT
Start: 2023-08-22 | End: 2023-08-22

## 2023-08-22 NOTE — DISCHARGE NOTE PROVIDER - NSDCMRMEDTOKEN_GEN_ALL_CORE_FT
gabapentin 300 mg oral capsule: 1 cap(s) orally once a day  hydrALAZINE 100 mg oral tablet: 1 tab(s) orally 3 times a day  labetalol 200 mg oral tablet: 1 tab(s) orally 2 times a day  pantoprazole 40 mg oral delayed release tablet: 1 tab(s) orally once a day (before a meal)  sevelamer carbonate 800 mg oral tablet: 2 tab(s) orally 3 times a day  simvastatin 20 mg oral tablet: 1 tab(s) orally once a day (at bedtime)

## 2023-08-22 NOTE — ED ADULT NURSE NOTE - OBJECTIVE STATEMENT
Pt presents to the ED A&Ox4 co needing dialysis. Pt hx ESRD (on dialysis-MWF), HTN, GERD. Pt states that he was scheduled to have dialysis yesterday but did not due to the fact that he does not want certain people "sticking him". Pt endorses upper abdominal pain. denies shortness of breath , chest pain, HA. Noted fistula to L arm.

## 2023-08-22 NOTE — H&P ADULT - PROBLEM SELECTOR PLAN 1
CXR and CT A/P noted pulmonary edema   ProBNP 34466  Hypoxia on 2L NC   Patient last HD on Friday.   acute hypoxic respiratory failure 2/2 pulmonary edema 2/2 missing dialysis   - s/p nephrology consult  - patient currently receiving dialysis, reports dyspnea resolved

## 2023-08-22 NOTE — H&P ADULT - PROBLEM SELECTOR PLAN 2
Reports substernal dull chest pain with no radiation last night . Chest pain now resolved.   Reviewed EKG on admission, normal sinus rhythm, no ST elevation or ST depression, unchanged from last ECG from August 7 ,2023  troponin 88--> 67, similar to troponin levels at previous hospital presentations   likely demand ischemia 2/2 missing dialysis

## 2023-08-22 NOTE — H&P ADULT - PROBLEM SELECTOR PLAN 3
reports RUQ abdominal pain last night, no nausea/vomiting, no diarrhea, now resolved  s/p CT A/P: no acute abdominopelvic findings, unchnaged hepatomegaly and bordeline splenomegaly.  s/p RUQ US: no acute cholecystitis   suspect abdominal pain 2/2 volume overload from missing dialysis

## 2023-08-22 NOTE — DISCHARGE NOTE PROVIDER - NSDCCPCAREPLAN_GEN_ALL_CORE_FT
PRINCIPAL DISCHARGE DIAGNOSIS  Diagnosis: ESRD needing dialysis  Assessment and Plan of Treatment: had HD, pt eloped      SECONDARY DISCHARGE DIAGNOSES  Diagnosis: Hypertension  Assessment and Plan of Treatment: pt elscarlettd

## 2023-08-22 NOTE — H&P ADULT - ASSESSMENT
58-year-old male with PMH HTN, ESRD on HD (M, W, F ) GERD presents to ED requesting dialysis. found to be acute hypoxic respiratory failure 2/2 pulmonary edema 2/2 missing dialysis, now receiving dialysis, symptoms resolved.

## 2023-08-22 NOTE — ED PROVIDER NOTE - OBJECTIVE STATEMENT
58-year-old male with PMH HTN, ESRD on HD (M, W, F ) GERD presents to ED requesting dialysis.  Patient states that he missed his dialysis treatment today because "certain people don't care about him" at his dialysis center [facility]\""".  Patient endorses symptoms of nonexertional intermittent dull chest pain that is non-radiating with associated SOB and RUQ tenderness since yesterday.  Patient is anuric.  Denies fever, chills, nausea, vomiting, changes in bowel movement.

## 2023-08-22 NOTE — H&P ADULT - NSHPPHYSICALEXAM_GEN_ALL_CORE
Vital Signs Last 24 Hrs  T(C): 36.6 (08-22-23 @ 12:45), Max: 36.9 (08-22-23 @ 04:06)  T(F): 97.9 (08-22-23 @ 12:45), Max: 98.5 (08-22-23 @ 04:06)  HR: 83 (08-22-23 @ 12:45) (83 - 93)  BP: 170/92 (08-22-23 @ 12:45) (157/71 - 176/96)  BP(mean): --  RR: 16 (08-22-23 @ 12:45) (16 - 20)  SpO2: 97% (08-22-23 @ 12:45) (88% - 97%) Vital Signs Last 24 Hrs  T(C): 36.6 (08-22-23 @ 12:45), Max: 36.9 (08-22-23 @ 04:06)  T(F): 97.9 (08-22-23 @ 12:45), Max: 98.5 (08-22-23 @ 04:06)  HR: 83 (08-22-23 @ 12:45) (83 - 93)  BP: 170/92 (08-22-23 @ 12:45) (157/71 - 176/96)  BP(mean): --  RR: 16 (08-22-23 @ 12:45) (16 - 20)  SpO2: 97% (08-22-23 @ 12:45) (88% - 97%)    General: lying in bed during dialysis, no acute distress  CV: regular rate/rhythm, no murmur  Resp: normal respiratory effort, no wheezes/crackles   Abd: soft, nondistended, nontender to palpation, normal active bowel sounds  Extremities: no lower extremity edema; bilateral lower extremity similar in size  Access: AVF in use during dialysis   Neuro: alert, oriented x3, follows verbal commands

## 2023-08-22 NOTE — ED PROVIDER NOTE - ATTENDING CONTRIBUTION TO CARE
MD De La Rosa:  patient seen and evaluated personally.   I agree with the History & Physical,  Impression & Plan other than what was detailed in my note.  MD De La Rosa  58-year-old male with PMH HTN, ESRD on HD (M, W, F ) GERD presents to ED w cc of "I need dialysis", states he doesn't get along with the people there so he does not go, denies f/c cp, sob, n/v, abd pain however does have pos ttp on abd exam in epigastric area, does not make urine, no palpitations, afebrile vitals stable,  non toxic well appearing, NC/AT,  conjunctiva non conjected, sclera anicteric, moist mucous membranes, neck supple, heart sounds, normal, no mrg, lungs w/ mild crackles b/l no wrr, abd soft non distended w/ mild epigastric tenderness, no visual deformities of extremities, axox3,  normal mood and affect, plan to get cbc cmp, trop pro bnp, cxr, possible fluid overload, will also ct abd r/o sbo. pt will need admission for dialysis .

## 2023-08-22 NOTE — ED PROVIDER NOTE - PROGRESS NOTE DETAILS
Jessa Pastrana DO (PGY3): Patient signed out to me by night team. Pending CT for abd pain, will also obtain RUQ US. Spoke with Dr. Corral pt nephrologist as pt will need dialysis today, pt tba for CP/SOB and anemia in the setting of missed dialysis. Pending CT/US. Patient signed out to me by the prior attending.  The patient's disposition was discussed with the treating team and agreed upon.  Alfonzo Moon M.D. (attending) rh3: Patrick 58m- missed dialysis MWF, anuric, no fluid overload symptoms or sob, tender abdomen, pending ultrasound and ct a/p as well as discussion with patient's private nephrologist. Jessa Pastrana DO (PGY3): CT negative for acute pathology.  Patient receiving 1 unit PRBC currently.  Spoke with hospitalist, will admit to medicine for transfusion and dialysis.  Upon reassessment, pt O2 88% on RA, pt placed on 2L NC with improvement. no tachypnea or increased WOB noted. Spoke with Dr. Corral with nephrology, will have pt go directly to dialysis while receiving blood transfusion. Spoke with hospitalist will admit to medicine.

## 2023-08-22 NOTE — ED PROVIDER NOTE - PHYSICAL EXAMINATION
Gen: NAD, non-toxic appearing  Head: normal appearing  HEENT: normal conjunctiva, oral mucosa moist  Lung: no respiratory distress, speaking in full sentences, inspiratory rales b/l     CV: regular rate and rhythm, no murmurs  Abd: soft, non distended, tender RUQ   MSK: no visible deformities  Neuro: No focal deficits, AAOx3  Skin: Warm  Psych: normal affect

## 2023-08-22 NOTE — H&P ADULT - PROBLEM SELECTOR PLAN 4
Chronic anemia Hgb baseline 7-8 2/2 ESRD; on admission Hgb 6.7, receiving 1 unit pRBC during dialysis ;   - f/u post-transfusion CBC, goal Hgb > 7   patient noncompliant with EPO treatments  patine denies hemoptysis, hematemesis. melena, BRBPR

## 2023-08-22 NOTE — ED ADULT NURSE NOTE - ED STAT RN HANDOFF DETAILS
Report endorsed to oncoming jennifer RN. Safety checks completed this shift. Safety rounds completed hourly.  IV sites checked Q2+remains WDL. Medications administered as ordered with no signs/symptoms of adverse reactions. Fall & skin precautions in place. Any issues endorsed to oncoming RN for follow up.

## 2023-08-22 NOTE — ED ADULT NURSE NOTE - NSFALLUNIVINTERV_ED_ALL_ED
Bed/Stretcher in lowest position, wheels locked, appropriate side rails in place/Call bell, personal items and telephone in reach/Instruct patient to call for assistance before getting out of bed/chair/stretcher/Non-slip footwear applied when patient is off stretcher/[place] to call system/Physically safe environment - no spills, clutter or unnecessary equipment/Purposeful proactive rounding/Room/bathroom lighting operational, light cord in reach

## 2023-08-22 NOTE — ED ADULT NURSE REASSESSMENT NOTE - NS ED NURSE REASSESS COMMENT FT1
Patient returned from dialysis and walking out the door, stating he wants to go home. Was able to make him wait for the admitting attending to come down and speak with him. KIERAN Toussaint came down within a short time and was speaking with him, agreed to AMA him. Patient did not want to wait for paperwork and eloped from the ED. In triage was stopped by RN and IV was removed. KIERAN Howardm down with patient and aware of elopement.

## 2023-08-22 NOTE — H&P ADULT - HISTORY OF PRESENT ILLNESS
58-year-old male with PMH HTN, ESRD on HD (M, W, F ) GERD presents to ED requesting dialysis.             Patient reports his last dialysis was last Friday. Since yesterday evening he developed substernal chest pain, described as dullness, nonexertional, no radiation, with concurrent RUQ abdominal pain, SOB. These symptoms lasted throughout the night. He did not go to his outpatient dialysis center due to "certain people dont like me."           ED course: Patient found to be hypoxic put on 2L NC, Hgb 6.7, and  1 unit pRBC given during HD.  58-year-old male with PMH HTN, ESRD on HD (M, W, F ) GERD presents to ED requesting dialysis.             Patient reports his last dialysis was last Friday. Since yesterday evening he developed substernal chest pain, described as dullness, nonexertional, no radiation, with concurrent RUQ abdominal pain, SOB. These symptoms lasted throughout the night. No nausea, no vomiting, no diarrhea, no hemoptysis, no hematemesis, no melena, no BRBPR.  He did not go to his outpatient dialysis center due to "certain people dont like me."           ED course: Patient found to be hypoxic put on 2L NC, Hgb 6.7, and  1 unit pRBC given during HD.

## 2023-08-22 NOTE — H&P ADULT - PROBLEM SELECTOR PLAN 5
access; VANESSA CLEMENTS  Schedule: [facility]  HD center: [facility]  s/p nephrology evaluation, HD today

## 2023-08-22 NOTE — DISCHARGE NOTE PROVIDER - NSDCFUSCHEDAPPT_GEN_ALL_CORE_FT
Himanshu Aguiar  [facility]  CARDIOLOGY [address]   Scheduled Appointment: 08/29/2023    [facility]  VASCULAR [address]  Scheduled Appointment: 09/11/2023    Samuel Wray  [facility]  VASCULAR [address]  Scheduled Appointment: 09/11/2023

## 2023-08-22 NOTE — H&P ADULT - NSHPLABSRESULTS_GEN_ALL_CORE
6.7    8.10  )-----------( 117      ( 22 Aug 2023 05:33 )             21.2     Hgb Trend: 6.7<--  08-22    138  |  99  |  72<H>  ----------------------------<  113<H>  4.4   |  22  |  14.16<H>    Ca    8.6      22 Aug 2023 05:33    TPro  7.0  /  Alb  3.1<L>  /  TBili  0.4  /  DBili  x   /  AST  12  /  ALT  <5<L>  /  AlkPhos  105  08-22    Creatinine Trend: 14.16<--, 14.00<--, 18.42<--, 18.17<--, 17.68<--  PT/INR - ( 22 Aug 2023 06:46 )   PT: 12.3 sec;   INR: 1.12 ratio         PTT - ( 22 Aug 2023 06:46 )  PTT:32.7 sec      Urinalysis Basic - ( 22 Aug 2023 05:33 )    Color: x / Appearance: x / SG: x / pH: x  Gluc: 113 mg/dL / Ketone: x  / Bili: x / Urobili: x   Blood: x / Protein: x / Nitrite: x   Leuk Esterase: x / RBC: x / WBC x   Sq Epi: x / Non Sq Epi: x / Bacteria: x 6.7    8.10  )-----------( 117      ( 22 Aug 2023 05:33 )             21.2     Hgb Trend: 6.7<--  08-22    138  |  99  |  72<H>  ----------------------------<  113<H>  4.4   |  22  |  14.16<H>    Ca    8.6      22 Aug 2023 05:33    TPro  7.0  /  Alb  3.1<L>  /  TBili  0.4  /  DBili  x   /  AST  12  /  ALT  <5<L>  /  AlkPhos  105  08-22    Creatinine Trend: 14.16<--, 14.00<--, 18.42<--, 18.17<--, 17.68<--  PT/INR - ( 22 Aug 2023 06:46 )   PT: 12.3 sec;   INR: 1.12 ratio         PTT - ( 22 Aug 2023 06:46 )  PTT:32.7 sec      Urinalysis Basic - ( 22 Aug 2023 05:33 )    Color: x / Appearance: x / SG: x / pH: x  Gluc: 113 mg/dL / Ketone: x  / Bili: x / Urobili: x   Blood: x / Protein: x / Nitrite: x   Leuk Esterase: x / RBC: x / WBC x   Sq Epi: x / Non Sq Epi: x / Bacteria: x    < from: CT Abdomen and Pelvis No Cont (08.22.23 @ 09:16) >    IMPRESSION:  *  Limited noncontrast examination. No acute abdominopelvic findings.  *  Bilaterally enlarged polycystic kidneys with numerous indeterminant   renal lesions bilaterally. Recommend further evaluation with contrast   enhanced abdominal MRI.  *  Hepatomegaly and borderline splenomegaly. Unchanged diffuse   hyperattenuation of the spleen, which can beseen with iron deposition.  *  Cardiomegaly.  *  Groundglass opacities and interlobular septal thickening at the lung   bases, likely representing pulmonary edema.  *  Trace pericardial effusion, small right and trace left pleural   effusions, and small volume ascites. Mild generalized subcutaneous edema.        < end of copied text >    < from: US Abdomen Upper Quadrant Right (08.22.23 @ 08:49) >    IMPRESSION:  No sonographic evidence of acute cholecystitis.    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      < end of copied text >

## 2023-08-22 NOTE — CONSULT NOTE ADULT - ASSESSMENT
58-year-old male with PMH HTN, ESRD on HD (M, W, F ) GERD presents to ED requesting dialysis for missing HD sessions for 1 week.    ESRD on HD  consent in chart  access; VANESSA AVF  Schedule: [facility]  HD center: [facility]  plan  UF as tolerated with HD today  after HD ant transfusion, not opposed to DC patient home    Anemia of CKD  missing epo doses due to inconsistent treatments  agreed with transfusion  stable for DC from renal perspective after HD and transfusion      Thank you for allowing me to participate in the care of your patient    For any question, call:  Cell # [phone]  Pager # [phone]  Callback # [phone] 58-year-old male with PMH HTN, ESRD on HD (M, W, F ) GERD presents to ED requesting dialysis for missing HD sessions for 1 week.    ESRD on HD  consent in chart  access; SINANE AVF  Schedule: [facility]  HD center: [facility]  plan  UF as tolerated with HD today  after HD ant transfusion, not opposed to DC patient home    Anemia of CKD  missing epo doses due to inconsistent treatments  agreed with transfusion  stable for DC from renal perspective after HD and transfusion    HTN  Uncontrolled  UF on HD as tolerated  resume home regimen      Thank you for allowing me to participate in the care of your patient    For any question, call:  Cell # [phone]  Pager # [phone]  Callback # [phone]

## 2023-08-22 NOTE — DISCHARGE NOTE PROVIDER - HOSPITAL COURSE
Patient reports his last dialysis was last Friday. Since yesterday evening he developed substernal chest pain, described as dullness, nonexertional, no radiation, with concurrent RUQ abdominal pain, SOB. These symptoms lasted throughout the night. He had HD, seen by Renal, and he was admitted for follow up but he eloped from the ED (this is 4th time, he has h/o elopement), his iv access was removed by ED provider. Attending is aware.

## 2023-08-22 NOTE — CONSULT NOTE ADULT - SUBJECTIVE AND OBJECTIVE BOX
[facility] - ОЛЬГА Herron / ОЛЬГА Avilez / INOCENCIA Perales/ ОЛЬГА Corral/ ОЛЬГА Langley/ SUKHWINDER West / NAHUM Francois / ISMAEL Rose  -------------------------------------------------------------------------------------------------------  The patient seen and examined today.  HPI:  58-year-old male with PMH HTN, ESRD on HD (M, W, F ) GERD presents to ED requesting dialysis.  Patient states that he missed his dialysis treatments. He often walks out when the technician or nurse that will cannulate him are not his preferred people, has missed a week at this time and advised to come to ED.  Patient endorses symptoms of nonexertional intermittent dull chest pain that is non-radiating with associated SOB and RUQ tenderness since yesterday.  Patient is anuric.  Denies fever, chills, nausea, vomiting, changes in bowel movement.    PAST MEDICAL & SURGICAL HISTORY:  Benign Essential Hypertension      Hypertension      GERD (gastroesophageal reflux disease)      ESRD on hemodialysis      Hyperlipidemia      Vitamin D deficiency      Tobacco use      HTN (hypertension)      HLD (hyperlipidemia)      History of open reduction and internal fixation (ORIF) procedure  Left Leg  1990  Hardware subsequently removed. Year unknown      AV fistula  Left upper arm 2/2006      No significant past surgical history        Allergies :- No Known Allergies    Home Medications Reviewed  Hospital Medications:   MEDICATIONS  (STANDING):    SOCIAL HISTORY:  Denies ETOh,Smoking,   FAMILY HISTORY:  Family history of chronic renal disease    Family history of lymphoma    Family history of hypertension in mother  HTN        REVIEW OF SYSTEMS:  CONSTITUTIONAL: No weakness, fevers or chills  EYES/ENT: No visual changes;  No vertigo or throat pain   NECK: No pain or stiffness  RESPIRATORY: No cough, wheezing, hemoptysis; No shortness of breath  CARDIOVASCULAR: Occasional chest discomfort  GASTROINTESTINAL: No abdominal or epigastric pain. No nausea, vomiting, or hematemesis; No diarrhea or constipation. No melena or hematochezia.  GENITOURINARY: No dysuria, frequency, foamy urine, urinary urgency, incontinence or hematuria  NEUROLOGICAL: No numbness or weakness  SKIN: No itching, burning, rashes, or lesions   VASCULAR: No bilateral lower extremity edema.   All other review of systems is negative unless indicated above.    VITALS:  T(F): 97.9 (08-22-23 @ 12:45), Max: 98.5 (08-22-23 @ 04:06)  HR: 83 (08-22-23 @ 12:45)  BP: 170/92 (08-22-23 @ 12:45)  RR: 16 (08-22-23 @ 12:45)  SpO2: 97% (08-22-23 @ 12:45)  Wt(kg): --    Height (cm): 177.8 (08-22 @ 04:06)  Weight (kg): 83 (08-22 @ 04:06)  BMI (kg/m2): 26.3 (08-22 @ 04:06)  BSA (m2): 2.01 (08-22 @ 04:06)    PHYSICAL EXAM:  Constitutional: NAD  HEENT: anicteric sclera, oropharynx clear, MMM  Neck: supple.   Respiratory: Bilateral equal breath sounds , no wheezes, no crackles  Cardiovascular: S1, S2, Regular, Murmur present.  Gastrointestinal: Bowel Sound present, soft, NT/ND  Extremities: No cyanosis or clubbing. No peripheral edema  Neurological: Alert and oriented x 3, no focal deficits  Psychiatric: Normal mood, normal affect  : No CVA tenderness. No rubio.   Skin: No rashes    Data:  08-22    138  |  99  |  72<H>  ----------------------------<  113<H>  4.4   |  22  |  14.16<H>    Ca    8.6      22 Aug 2023 05:33    TPro  7.0  /  Alb  3.1<L>  /  TBili  0.4  /  DBili      /  AST  12  /  ALT  <5<L>  /  AlkPhos  105  08-22    Creatinine Trend: 14.16 <--                        6.7    8.10  )-----------( 117      ( 22 Aug 2023 05:33 )             21.2     Urine Studies:  Urinalysis Basic - ( 22 Aug 2023 05:33 )    Color:  / Appearance:  / SG:  / pH:   Gluc: 113 mg/dL / Ketone:   / Bili:  / Urobili:    Blood:  / Protein:  / Nitrite:    Leuk Esterase:  / RBC:  / WBC    Sq Epi:  / Non Sq Epi:  / Bacteria:

## 2023-08-22 NOTE — ED PROVIDER NOTE - CLINICAL SUMMARY MEDICAL DECISION MAKING FREE TEXT BOX
Hemodynamically stable pt presents today requesting dialysis after missing session today. Endorses symptoms of chest pain, SOB Hemodynamically stable pt presents today requesting dialysis after missing session today. Endorses symptoms of chest pain and SOB w/ lung findings concerning for fluid overload vs ACS.; ordered BNP, CBC, CMP, CXR, EKG, Trop. RUQ w/ systemic symptoms w/ get CT abd/pelvis to r/o abd etiology. Likely admit for dialysis, will reassess.

## 2023-08-22 NOTE — H&P ADULT - NSHPREVIEWOFSYSTEMS_GEN_ALL_CORE
General: no fever, no chills  CV: chest pain as per HPI; currently denies chest pain   Pulm: SOB as per HPI  Abd: RUQ abdominal pain as per HPI  : anuric   Skin: no rashes General: no fever, no chills  CV: chest pain as per HPI; currently denies chest pain   Pulm: SOB as per HPI  Abd: RUQ abdominal pain as per HPI; denies hemoptysis, hematemesis. melena, BRBPR  : anuric   Skin: no rashes

## 2023-08-22 NOTE — DISCHARGE NOTE PROVIDER - CARE PROVIDER_API CALL
Dick Rose  Nephrology  [address] [place] [id number]  Phone: [phone]  Fax: [phone]  Follow Up Time:

## 2023-08-22 NOTE — ED PROVIDER NOTE - NS_EDPROVIDERDISPOUSERTYPE_ED_A_ED
Called Dr. Renay Dominguez office to inform them that patient has arrived from [place]. Attending Attestation (For Attendings USE Only)...

## 2023-08-22 NOTE — ED ADULT NURSE REASSESSMENT NOTE - NS ED NURSE REASSESS COMMENT FT1
Transported to dialysis, blood infusing, paper chart with blood sheet also sent to dialysis and handed to dialysis RN.

## 2023-09-05 ENCOUNTER — TRANSCRIPTION ENCOUNTER (OUTPATIENT)
Age: 58
End: 2023-09-05

## 2023-09-05 ENCOUNTER — INPATIENT (INPATIENT)
Facility: HOSPITAL | Age: 58
LOS: 0 days | Discharge: LEFT BEFORE DISCHARGE | DRG: 682 | End: 2023-09-05
Attending: STUDENT IN AN ORGANIZED HEALTH CARE EDUCATION/TRAINING PROGRAM | Admitting: HOSPITALIST
Payer: MEDICAID

## 2023-09-05 VITALS
TEMPERATURE: 98 F | SYSTOLIC BLOOD PRESSURE: 165 MMHG | HEART RATE: 97 BPM | OXYGEN SATURATION: 96 % | DIASTOLIC BLOOD PRESSURE: 77 MMHG | RESPIRATION RATE: 20 BRPM | WEIGHT: 174.17 LBS | HEIGHT: 70 IN

## 2023-09-05 VITALS
SYSTOLIC BLOOD PRESSURE: 149 MMHG | RESPIRATION RATE: 20 BRPM | HEART RATE: 86 BPM | OXYGEN SATURATION: 94 % | DIASTOLIC BLOOD PRESSURE: 95 MMHG | TEMPERATURE: 98 F

## 2023-09-05 DIAGNOSIS — Z98.89 OTHER SPECIFIED POSTPROCEDURAL STATES: Chronic | ICD-10-CM

## 2023-09-05 DIAGNOSIS — N18.6 END STAGE RENAL DISEASE: ICD-10-CM

## 2023-09-05 DIAGNOSIS — R06.02 SHORTNESS OF BREATH: ICD-10-CM

## 2023-09-05 DIAGNOSIS — Z29.9 ENCOUNTER FOR PROPHYLACTIC MEASURES, UNSPECIFIED: ICD-10-CM

## 2023-09-05 DIAGNOSIS — I10 ESSENTIAL (PRIMARY) HYPERTENSION: ICD-10-CM

## 2023-09-05 DIAGNOSIS — I77.0 ARTERIOVENOUS FISTULA, ACQUIRED: Chronic | ICD-10-CM

## 2023-09-05 LAB
ALBUMIN SERPL ELPH-MCNC: 3.3 G/DL — SIGNIFICANT CHANGE UP (ref 3.3–5)
ALP SERPL-CCNC: 123 U/L — HIGH (ref 40–120)
ALT FLD-CCNC: <5 U/L — LOW (ref 10–45)
ANION GAP SERPL CALC-SCNC: 20 MMOL/L — HIGH (ref 5–17)
AST SERPL-CCNC: 10 U/L — SIGNIFICANT CHANGE UP (ref 10–40)
BASE EXCESS BLDV CALC-SCNC: 1.8 MMOL/L — SIGNIFICANT CHANGE UP (ref -2–3)
BILIRUB SERPL-MCNC: 0.3 MG/DL — SIGNIFICANT CHANGE UP (ref 0.2–1.2)
BUN SERPL-MCNC: 76 MG/DL — HIGH (ref 7–23)
CA-I SERPL-SCNC: 1.12 MMOL/L — LOW (ref 1.15–1.33)
CALCIUM SERPL-MCNC: 8.9 MG/DL — SIGNIFICANT CHANGE UP (ref 8.4–10.5)
CHLORIDE BLDV-SCNC: 103 MMOL/L — SIGNIFICANT CHANGE UP (ref 96–108)
CHLORIDE SERPL-SCNC: 97 MMOL/L — SIGNIFICANT CHANGE UP (ref 96–108)
CO2 BLDV-SCNC: 28 MMOL/L — HIGH (ref 22–26)
CO2 SERPL-SCNC: 23 MMOL/L — SIGNIFICANT CHANGE UP (ref 22–31)
CREAT SERPL-MCNC: 14.65 MG/DL — HIGH (ref 0.5–1.3)
EGFR: 4 ML/MIN/1.73M2 — LOW
GAS PNL BLDV: 139 MMOL/L — SIGNIFICANT CHANGE UP (ref 136–145)
GAS PNL BLDV: SIGNIFICANT CHANGE UP
GAS PNL BLDV: SIGNIFICANT CHANGE UP
GLUCOSE BLDV-MCNC: 130 MG/DL — HIGH (ref 70–99)
GLUCOSE SERPL-MCNC: 129 MG/DL — HIGH (ref 70–99)
HCO3 BLDV-SCNC: 27 MMOL/L — SIGNIFICANT CHANGE UP (ref 22–29)
HCT VFR BLD CALC: 26.2 % — LOW (ref 39–50)
HCT VFR BLDA CALC: 26 % — LOW (ref 39–51)
HGB BLD CALC-MCNC: 8.7 G/DL — LOW (ref 12.6–17.4)
HGB BLD-MCNC: 8.2 G/DL — LOW (ref 13–17)
LACTATE BLDV-MCNC: 1.6 MMOL/L — SIGNIFICANT CHANGE UP (ref 0.5–2)
MAGNESIUM SERPL-MCNC: 2.3 MG/DL — SIGNIFICANT CHANGE UP (ref 1.6–2.6)
MCHC RBC-ENTMCNC: 29.2 PG — SIGNIFICANT CHANGE UP (ref 27–34)
MCHC RBC-ENTMCNC: 31.3 GM/DL — LOW (ref 32–36)
MCV RBC AUTO: 93.2 FL — SIGNIFICANT CHANGE UP (ref 80–100)
NRBC # BLD: 0 /100 WBCS — SIGNIFICANT CHANGE UP (ref 0–0)
NT-PROBNP SERPL-SCNC: HIGH PG/ML (ref 0–300)
PCO2 BLDV: 42 MMHG — SIGNIFICANT CHANGE UP (ref 42–55)
PH BLDV: 7.41 — SIGNIFICANT CHANGE UP (ref 7.32–7.43)
PHOSPHATE SERPL-MCNC: 3.7 MG/DL — SIGNIFICANT CHANGE UP (ref 2.5–4.5)
PLATELET # BLD AUTO: 119 K/UL — LOW (ref 150–400)
PO2 BLDV: 38 MMHG — SIGNIFICANT CHANGE UP (ref 25–45)
POTASSIUM BLDV-SCNC: 4.7 MMOL/L — SIGNIFICANT CHANGE UP (ref 3.5–5.1)
POTASSIUM SERPL-MCNC: 4.1 MMOL/L — SIGNIFICANT CHANGE UP (ref 3.5–5.3)
POTASSIUM SERPL-SCNC: 4.1 MMOL/L — SIGNIFICANT CHANGE UP (ref 3.5–5.3)
PROT SERPL-MCNC: 7.3 G/DL — SIGNIFICANT CHANGE UP (ref 6–8.3)
RBC # BLD: 2.81 M/UL — LOW (ref 4.2–5.8)
RBC # FLD: 16.7 % — HIGH (ref 10.3–14.5)
SAO2 % BLDV: 66.8 % — LOW (ref 67–88)
SODIUM SERPL-SCNC: 140 MMOL/L — SIGNIFICANT CHANGE UP (ref 135–145)
WBC # BLD: 8.57 K/UL — SIGNIFICANT CHANGE UP (ref 3.8–10.5)
WBC # FLD AUTO: 8.57 K/UL — SIGNIFICANT CHANGE UP (ref 3.8–10.5)

## 2023-09-05 PROCEDURE — 84100 ASSAY OF PHOSPHORUS: CPT

## 2023-09-05 PROCEDURE — 85014 HEMATOCRIT: CPT

## 2023-09-05 PROCEDURE — 82435 ASSAY OF BLOOD CHLORIDE: CPT

## 2023-09-05 PROCEDURE — 82947 ASSAY GLUCOSE BLOOD QUANT: CPT

## 2023-09-05 PROCEDURE — 99285 EMERGENCY DEPT VISIT HI MDM: CPT

## 2023-09-05 PROCEDURE — G0378: CPT

## 2023-09-05 PROCEDURE — 83605 ASSAY OF LACTIC ACID: CPT

## 2023-09-05 PROCEDURE — 83880 ASSAY OF NATRIURETIC PEPTIDE: CPT

## 2023-09-05 PROCEDURE — 99236 HOSP IP/OBS SAME DATE HI 85: CPT

## 2023-09-05 PROCEDURE — 85018 HEMOGLOBIN: CPT

## 2023-09-05 PROCEDURE — 82803 BLOOD GASES ANY COMBINATION: CPT

## 2023-09-05 PROCEDURE — 83735 ASSAY OF MAGNESIUM: CPT

## 2023-09-05 PROCEDURE — 85027 COMPLETE CBC AUTOMATED: CPT

## 2023-09-05 PROCEDURE — 71045 X-RAY EXAM CHEST 1 VIEW: CPT

## 2023-09-05 PROCEDURE — 71045 X-RAY EXAM CHEST 1 VIEW: CPT | Mod: 26

## 2023-09-05 PROCEDURE — 84295 ASSAY OF SERUM SODIUM: CPT

## 2023-09-05 PROCEDURE — 80053 COMPREHEN METABOLIC PANEL: CPT

## 2023-09-05 PROCEDURE — 84132 ASSAY OF SERUM POTASSIUM: CPT

## 2023-09-05 PROCEDURE — 82330 ASSAY OF CALCIUM: CPT

## 2023-09-05 RX ORDER — LABETALOL HCL 100 MG
300 TABLET ORAL THREE TIMES A DAY
Refills: 0 | Status: DISCONTINUED | OUTPATIENT
Start: 2023-09-05 | End: 2023-09-05

## 2023-09-05 RX ORDER — GABAPENTIN 400 MG/1
300 CAPSULE ORAL DAILY
Refills: 0 | Status: DISCONTINUED | OUTPATIENT
Start: 2023-09-05 | End: 2023-09-05

## 2023-09-05 RX ORDER — HYDRALAZINE HCL 50 MG
100 TABLET ORAL THREE TIMES A DAY
Refills: 0 | Status: DISCONTINUED | OUTPATIENT
Start: 2023-09-05 | End: 2023-09-05

## 2023-09-05 RX ORDER — LISINOPRIL 2.5 MG/1
40 TABLET ORAL DAILY
Refills: 0 | Status: DISCONTINUED | OUTPATIENT
Start: 2023-09-05 | End: 2023-09-05

## 2023-09-05 RX ORDER — CINACALCET 30 MG/1
30 TABLET, FILM COATED ORAL DAILY
Refills: 0 | Status: DISCONTINUED | OUTPATIENT
Start: 2023-09-05 | End: 2023-09-05

## 2023-09-05 RX ORDER — AMLODIPINE BESYLATE 2.5 MG/1
10 TABLET ORAL DAILY
Refills: 0 | Status: DISCONTINUED | OUTPATIENT
Start: 2023-09-05 | End: 2023-09-05

## 2023-09-05 NOTE — H&P ADULT - HISTORY OF PRESENT ILLNESS
58y Male with PMH of ESRD on HD MWF, HTN presents from home with missed HD session. Patient reports he missed yesterday's HD session, started to feel shortness of breathe at night and therefore presents to the ED today. Denies any fever, chills, headache, neurological deficits, nausea, vomiting, chest pain, palpitations, shortness of breathe, abdominal pain, hematochezia, melena, hematemesis, diarrhea or constipation currently

## 2023-09-05 NOTE — ED ADULT TRIAGE NOTE - WEIGHT IN KG
----- Message from Galvez Ina sent at 6/28/2021 12:12 PM EDT -----  Regarding: Dr. Tyler Elkins  Patient return call    Caller's first and last name and relationship (if not the patient): Self      Best contact number(s): [phone]      Whose call is being returned: Nurse      Details to clarify the request: Pt trying to lab results      Galvez Ina
Called and advised patient of normal labs.
79

## 2023-09-05 NOTE — H&P ADULT - NSHPPHYSICALEXAM_GEN_ALL_CORE
Vital Signs Last 24 Hrs  T(C): 36.8 (05 Sep 2023 08:05), Max: 36.8 (05 Sep 2023 08:05)  T(F): 98.3 (05 Sep 2023 08:05), Max: 98.3 (05 Sep 2023 08:05)  HR: 86 (05 Sep 2023 08:05) (86 - 97)  BP: 149/95 (05 Sep 2023 08:05) (149/95 - 169/83)  BP(mean): --  RR: 20 (05 Sep 2023 08:05) (19 - 20)  SpO2: 94% (05 Sep 2023 08:05) (94% - 98%)    Parameters below as of 05 Sep 2023 08:05  Patient On (Oxygen Delivery Method): room air      CONSTITUTIONAL: Well-groomed, in no apparent distress  EYES: No conjunctival or scleral injection, non-icteric  ENMT: No external nasal lesions; Normal outer ears  NECK: Supple; Trachea midline  RESPIRATORY: Normal respiratory effort; lungs are clear to auscultation bilaterally without wheeze/rhonchi/rales  CARDIOVASCULAR: Regular rate and rhythm, normal S1 and S2, no murmur/rub/gallop; No lower extremity edema  GASTROINTESTINAL: Non-distended; No palpable masses; No tenderness; No rebound/guarding  MUSCULOSKELETAL:  Normal gait;  NEUROLOGY: A+O to person, place, and time; no gross motor deficits   PSYCHIATRY: Mood and Affect appropriate

## 2023-09-05 NOTE — DISCHARGE NOTE PROVIDER - CARE PROVIDER_API CALL
Brandon Corral  Nephrology  [address] [place] [address]  Phone: [phone]  Fax: [phone]  Follow Up Time: 1 week

## 2023-09-05 NOTE — ED ADULT NURSE REASSESSMENT NOTE - NS ED NURSE REASSESS COMMENT FT1
Pt complaints of waiting time for dialysis is too long.  [facility]  team contacted, NP will come to the ER to speak with patient.

## 2023-09-05 NOTE — ED PROVIDER NOTE - NS ED ROS FT
REVIEW OF SYSTEMS:    CONSTITUTIONAL: No weakness, fevers or chills  EYES/ENT: No visual changes;  No vertigo or throat pain   NECK: No pain or stiffness  RESPIRATORY: No cough, wheezing, hemoptysis; shortness of breath  CARDIOVASCULAR: No chest pain or palpitations  GASTROINTESTINAL: No abdominal or epigastric pain. No nausea, vomiting, or hematemesis; No diarrhea or constipation. No melena or hematochezia.  GENITOURINARY: No dysuria, frequency or hematuria  NEUROLOGICAL: No numbness or weakness  SKIN: No itching, rashes

## 2023-09-05 NOTE — CONSULT NOTE ADULT - SUBJECTIVE AND OBJECTIVE BOX
[facility] Consult Note Nephrology - CONSULTATION NOTE    59 y/o M PMH ESRD on HD (MWF) and frequent admissions for missed dialysis sessions is re-presenting for missed dialysis session on 9/4. Last dialysis session was 9/1. Patient endorsing SOB as well. Currently denying palpitations.   Patient states he missed his dialysis session on Monday because "he just didn't go."  renal consult for esrd on hd    PAST MEDICAL & SURGICAL HISTORY:  Benign Essential Hypertension      Hypertension      GERD (gastroesophageal reflux disease)      ESRD on hemodialysis      Hyperlipidemia      Vitamin D deficiency      Tobacco use      HTN (hypertension)      HLD (hyperlipidemia)      History of open reduction and internal fixation (ORIF) procedure  Left Leg  1990  Hardware subsequently removed. Year unknown      AV fistula  Left upper arm 2/2006      No significant past surgical history        No Known Allergies    Home Medications Reviewed  Hospital Medications:   MEDICATIONS  (STANDING):    SOCIAL HISTORY:  Denies ETOh,Smoking,   FAMILY HISTORY:  Family history of chronic renal disease    Family history of lymphoma    Family history of hypertension in mother  HTN      REVIEW OF SYSTEMS:  CONSTITUTIONAL: No weakness, fevers or chills  EYES/ENT: No visual changes;  No vertigo or throat pain   NECK: No pain or stiffness  RESPIRATORY: +sob  CARDIOVASCULAR: No chest pain or palpitations.  GASTROINTESTINAL: No abdominal or epigastric pain. No nausea, vomiting, or hematemesis; No diarrhea or constipation. No melena or hematochezia.  GENITOURINARY: No dysuria, frequency, foamy urine, urinary urgency, incontinence or hematuria  NEUROLOGICAL: No numbness or weakness  SKIN: No itching, burning, rashes, or lesions   VASCULAR: No bilateral lower extremity edema.   All other review of systems is negative unless indicated above.    VITALS:  T(F): 98.3 (09-05-23 @ 08:05), Max: 98.3 (09-05-23 @ 08:05)  HR: 86 (09-05-23 @ 08:05)  BP: 149/95 (09-05-23 @ 08:05)  RR: 20 (09-05-23 @ 08:05)  SpO2: 94% (09-05-23 @ 08:05)  Wt(kg): --    Height (cm): 177.8 (09-05 @ 05:33)  Weight (kg): 79 (09-05 @ 05:33)  BMI (kg/m2): 25 (09-05 @ 05:33)  BSA (m2): 1.97 (09-05 @ 05:33)  PHYSICAL EXAM:  Constitutional: NAD  HEENT: anicteric sclera, oropharynx clear, MMM  Neck: No JVD  Respiratory: CTAB, no wheezes, rales or rhonchi  Cardiovascular: S1, S2, RRR  Gastrointestinal: BS+, soft, NT/ND  Extremities: No cyanosis or clubbing. No peripheral edema  Neurological: A/O x 3, no focal deficits  Psychiatric: Normal mood, normal affect  : No CVA tenderness. No rubio.   Skin: No rashes  Vascular Access: left upper ext avf + thrill    LABS:  09-05    140  |  97  |  76<H>  ----------------------------<  129<H>  4.1   |  23  |  14.65<H>    Ca    8.9      05 Sep 2023 06:22  Phos  3.7     09-05  Mg     2.3     09-05    TPro  7.3  /  Alb  3.3  /  TBili  0.3  /  DBili      /  AST  10  /  ALT  <5<L>  /  AlkPhos  123<H>  09-05    Creatinine Trend: 14.65 <--                        8.2    8.57  )-----------( 119      ( 05 Sep 2023 06:22 )             26.2     Urine Studies:  Urinalysis Basic - ( 05 Sep 2023 06:22 )    Color:  / Appearance:  / SG:  / pH:   Gluc: 129 mg/dL / Ketone:   / Bili:  / Urobili:    Blood:  / Protein:  / Nitrite:    Leuk Esterase:  / RBC:  / WBC    Sq Epi:  / Non Sq Epi:  / Bacteria:         RADIOLOGY & ADDITIONAL STUDIES:

## 2023-09-05 NOTE — ED PROVIDER NOTE - CLINICAL SUMMARY MEDICAL DECISION MAKING FREE TEXT BOX
59 y/o M PMH ESRD on HD (MWF) and several admissions for missed HD Sessions presenting for missed HD session yesterday. Last HD session on Friday. Pending STAT labs, likely nephro consult and admission for HD.

## 2023-09-05 NOTE — H&P ADULT - PROBLEM SELECTOR PLAN 4
DVT Prophylaxis: Heparin SC  Diet: DASH/Renal  GI Prophylaxis: Not Indicated  Activity: Increase As Tolerated  Code Status: Full  Disposition: Acute

## 2023-09-05 NOTE — DISCHARGE NOTE PROVIDER - NSDCFUSCHEDAPPT_GEN_ALL_CORE_FT
[facility]  VASCULAR [address]  Scheduled Appointment: 09/11/2023    Samuel Wray  [facility]  VASCULAR [address]  Scheduled Appointment: 09/11/2023    Himanshu Aguiar  [facility]  CARDIOLOGY [address]   Scheduled Appointment: 10/17/2023     [facility]  VASCULAR [address]  Scheduled Appointment: 10/16/2023    Samuel Wray  [facility]  VASCULAR [address]  Scheduled Appointment: 10/16/2023    Himanshu Aguiar  [facility]  CARDIOLOGY [address]   Scheduled Appointment: 10/17/2023

## 2023-09-05 NOTE — CONSULT NOTE ADULT - ASSESSMENT
58yoM hx HTN, ESRD on HD M, W, F, GERD presents with increasing sob missed HD yesterday        1[facility]  [facility]: left upper ext avf  Consent in chart  Plan for HD today--> 2k bath and uf 3kg as tolerated  trend bmp      2) Anemia in CKD  hgb below goal  epo 20k tiw with hd  trend hgb    Dr Corral  [phone]

## 2023-09-05 NOTE — ED PROVIDER NOTE - OBJECTIVE STATEMENT
57 y/o M PMH ESRD on HD (MWF) and frequent admissions for missed dialysis sessions is re-presenting for missed dialysis session on 9/4. Last dialysis session was 9/2. Patient endorsing SOB as well. Currently denying palpitations.   Patient states he missed his dialysis session on Monday because "he just didn't go."

## 2023-09-05 NOTE — ED PROVIDER NOTE - PROGRESS NOTE DETAILS
spoke w Dr. Rose regarding need for dialysis volume overloaded, pt w/ no cp, will dialyze patient today, hospitalist paged

## 2023-09-05 NOTE — DISCHARGE NOTE PROVIDER - NSDCFUADDAPPT_GEN_ALL_CORE_FT
APPTS ARE READY TO BE MADE: [X] YES    Best Family or Patient Contact (if needed):    Additional Information about above appointments (if needed):    1: Follow up with PCP within 1 week of discharge  2: Follow up with nephrologist Dr. Corral within 1 week of discharge    3: Follow up with cardiology Dr. Aguiar for scheduled appointment      Other comments or requests:    APPTS ARE READY TO BE MADE: [X] YES    Best Family or Patient Contact (if needed):    Additional Information about above appointments (if needed):    1: Follow up with PCP within 1 week of discharge  2: Follow up with nephrologist Dr. Corral within 1 week of discharge    3: Follow up with cardiology Dr. Aguiar for scheduled appointment      Other comments or requests:   Patient/Caregiver declined scheduling assistance. Patient stated he has his own nephrologist he will follow up with.

## 2023-09-05 NOTE — DISCHARGE NOTE PROVIDER - HOSPITAL COURSE
HPI:  59 y/o M PMH ESRD on HD (MWF) and frequent admissions for missed dialysis sessions is re-presenting for missed dialysis session on 9/4. Last dialysis session was 9/2. Patient endorsing SOB as well. Currently denying palpitations. Patient states he missed his dialysis session on Monday because "he just didn't go."    Hospital Course:    ESRD on HD  -Receives HD M-W-F via LUE AVF at [facility] [place]  -Last HD session on Friday 9/2- pt missed session "just didn't go"  -Nephrology consulted and pt ordered for HD on 9/5 for 5 pm- pt refusing to remain in ED for HD- leaving AMA   -Follow up with nephrologist and for HD as per schedule     Anemia in CKD  -Hgb below goal  -epo 20k tiw with hd  -trend hgb        Important Medication Changes and Reason:  No medication changes, continue home medications as prescribed     Active or Pending Issues Requiring Follow-up:  ESRD on Dialysis- follow up with nephrologist outpatient     Advanced Directives:   [X] Full code  [ ] DNR  [ ] Hospice    Discharge Diagnoses:  SOB secondary to missed HD       Pt leaving AMA prior to medical/nephrology clearance and prior to receiving HD session

## 2023-09-05 NOTE — ED ADULT NURSE NOTE - NSFALLUNIVINTERV_ED_ALL_ED
Bed/Stretcher in lowest position, wheels locked, appropriate side rails in place/Call bell, personal items and telephone in reach/Instruct patient to call for assistance before getting out of bed/chair/stretcher/Non-slip footwear applied when patient is off stretcher/[place] to call system/Physically safe environment - no spills, clutter or unnecessary equipment/Purposeful proactive rounding/Room/bathroom lighting operational, light cord in reach

## 2023-09-05 NOTE — ED ADULT NURSE NOTE - OBJECTIVE STATEMENT
57 y/o Axox4 M arrived from home status post missed dialysis. PMH CKD( dialysis M/W/F, fistula L arm), HTN, HLD, current everyday smoker. Pt endorses missing dialysis on Monday, then developed SOB Monday night. Pt endorses waiting for it to self-improve with no results, so pt came to ED for eval. Pt does not use O2 supplementally at home. Pt denies chest pain, palpitations, N/V/D, chills. Pt on CM NSR. Pt ambulates independently.

## 2023-09-05 NOTE — H&P ADULT - PROBLEM SELECTOR PLAN 1
Likely due to missed HD  CXR showed mild pulmonary vascular congestion  Consult nephrology for HD session Likely due to missed HD  CXR showed mild pulmonary vascular congestion  Lungs CTA on physical exam  Consult nephrology for HD session

## 2023-09-05 NOTE — DISCHARGE NOTE PROVIDER - NSDCCPCAREPLAN_GEN_ALL_CORE_FT
PRINCIPAL DISCHARGE DIAGNOSIS  Diagnosis: ESRD needing dialysis  Assessment and Plan of Treatment: Continue Dialysis M-W-F at center as scheduled- do not skip a HD session  follow up with nephrology within 1 week   Avoid taking (NSAIDs) - (ex: Ibuprofen, Advil, Celebrex, Naprosyn)  Avoid taking any nephrotoxic agents (can harm kidneys) - Intravenous contrast for diagnostic testing, combination cold medications.  Have all medications adjusted for your renal function by your Health Care Provider.  Blood pressure control is important.  Take all medication as prescribed.

## 2023-09-05 NOTE — ED PROVIDER NOTE - ATTENDING CONTRIBUTION TO CARE
missed dialysis yesterda, last session on friday  hx of esrd   didn't want to go to his center,  no cp, + sob no nausea no vomiting, no confusion, on exam, has graft in the LUE, no overlying erythema nonlabored respirations, bibasilar crackles planfor labs and likely nephrology c/s for coordrination of dialysis.

## 2023-09-05 NOTE — H&P ADULT - PROBLEM SELECTOR PLAN 2
Missed Monday 9/4 HD session  CXR showed mild pulmonary vascular congestion  Consult nephrology for HD session  Follows Dr. Corral outpatient

## 2023-09-05 NOTE — ED ADULT NURSE REASSESSMENT NOTE - NS ED NURSE REASSESS COMMENT FT1
Pt states he is going to leave and does not want to wait for dialysis, RN informed pt that the admitting team can come down to speak to him but he did not want to wait, IV removed for pt safety.     Pt eloped from ED, MARTINA Germain made aware, Admitting NP made aware.

## 2023-09-05 NOTE — ED PROVIDER NOTE - PHYSICAL EXAMINATION
T(C): 36.6 (09-05-23 @ 06:10), Max: 36.6 (09-05-23 @ 05:33)  HR: 88 (09-05-23 @ 06:10) (88 - 97)  BP: 169/83 (09-05-23 @ 06:10) (165/77 - 169/83)  RR: 19 (09-05-23 @ 06:10) (19 - 20)  SpO2: 98% (09-05-23 @ 06:10) (96% - 98%)    GENERAL: patient appears well, no acute distress, appropriate, pleasant  EYES: sclera clear, no exudates  ENMT: oropharynx clear without erythema, no exudates, moist mucous membranes  NECK: supple, soft, no thyromegaly noted  LUNGS: good air entry bilaterally, rhonchi heard on right side   HEART: soft S1/S2, regular rate and rhythm, no murmurs noted, no lower extremity edema  GASTROINTESTINAL: abdomen is soft, nontender, nondistended, normoactive bowel sounds, no palpable masses  INTEGUMENT: good skin turgor, no lesions noted  MUSCULOSKELETAL: no clubbing or cyanosis, no obvious deformity  NEUROLOGIC: awake, alert, oriented x3, good muscle tone in 4 extremities, no obvious sensory deficits  PSYCHIATRIC: mood is good, affect is congruent, linear and logical thought process  HEME/LYMPH: no palpable supraclavicular nodules, no obvious ecchymosis or petechiae

## 2023-09-05 NOTE — DISCHARGE NOTE PROVIDER - ATTENDING DISCHARGE PHYSICAL EXAMINATION:
CONSTITUTIONAL: Well-groomed, in no apparent distress  EYES: No conjunctival or scleral injection, non-icteric  ENMT: No external nasal lesions; Normal outer ears  NECK: Supple; Trachea midline  RESPIRATORY: Normal respiratory effort; lungs are clear to auscultation bilaterally without wheeze/rhonchi/rales  CARDIOVASCULAR: Regular rate and rhythm, normal S1 and S2, no murmur/rub/gallop; No lower extremity edema  GASTROINTESTINAL: Non-distended; No palpable masses; No tenderness; No rebound/guarding  MUSCULOSKELETAL:  Normal gait;  NEUROLOGY: A+O to person, place, and time; no gross motor deficits   PSYCHIATRY: Mood and Affect appropriate

## 2023-09-05 NOTE — DISCHARGE NOTE PROVIDER - NSDCFUADDINST_GEN_ALL_CORE_FT
Follow up at HD center for scheduled HD session   Follow up with nephrologist within 1 week  Do NOT skip HD sessions

## 2023-09-05 NOTE — DISCHARGE NOTE PROVIDER - NSDCMRMEDTOKEN_GEN_ALL_CORE_FT
amLODIPine 10 mg oral tablet: 1 tab(s) orally once a day  gabapentin 300 mg oral capsule: 1 cap(s) orally once a day  hydrALAZINE 100 mg oral tablet: 1 tab(s) orally 3 times a day  labetalol 300 mg oral tablet: 1 tab(s) orally 3 times a day  lisinopril 40 mg oral tablet: 1 tab(s) orally once a day  Sensipar 30 mg oral tablet: 1 tab(s) orally once a day  Vitamin D3 1250 mcg (50,000 intl units) oral capsule: 1 cap(s) orally once a week

## 2023-09-06 NOTE — PACU DISCHARGE NOTE - THE ANESTHESIA ORDERS USED IN THE PACU ORDER SET WILL BE DISCONTINUED UPON TRANSFER OF THIS PATIENT
9/6/23-Patient seen and examined. Stable, pain free, plan adjust for HTN.    9/7/23 Pt seen and and examined today in dialysis suite. Denies any CP, CHF sxs at this time. Labs reviewed, chart reviewed. [facility] site C/D/I  
Statement Selected
Statement Selected

## 2023-09-10 ENCOUNTER — INPATIENT (INPATIENT)
Facility: HOSPITAL | Age: 58
LOS: 2 days | Discharge: HOME | DRG: 640 | End: 2023-09-13
Attending: INTERNAL MEDICINE | Admitting: HOSPITALIST
Payer: MEDICAID

## 2023-09-10 VITALS
TEMPERATURE: 98 F | SYSTOLIC BLOOD PRESSURE: 137 MMHG | OXYGEN SATURATION: 80 % | HEIGHT: 70 IN | RESPIRATION RATE: 92 BRPM | WEIGHT: 149.91 LBS | DIASTOLIC BLOOD PRESSURE: 37 MMHG | HEART RATE: 92 BPM

## 2023-09-10 DIAGNOSIS — Z29.9 ENCOUNTER FOR PROPHYLACTIC MEASURES, UNSPECIFIED: ICD-10-CM

## 2023-09-10 DIAGNOSIS — N18.6 END STAGE RENAL DISEASE: ICD-10-CM

## 2023-09-10 DIAGNOSIS — E78.5 HYPERLIPIDEMIA, UNSPECIFIED: ICD-10-CM

## 2023-09-10 DIAGNOSIS — Z98.89 OTHER SPECIFIED POSTPROCEDURAL STATES: Chronic | ICD-10-CM

## 2023-09-10 DIAGNOSIS — I77.0 ARTERIOVENOUS FISTULA, ACQUIRED: Chronic | ICD-10-CM

## 2023-09-10 DIAGNOSIS — I10 ESSENTIAL (PRIMARY) HYPERTENSION: ICD-10-CM

## 2023-09-10 DIAGNOSIS — R06.02 SHORTNESS OF BREATH: ICD-10-CM

## 2023-09-10 DIAGNOSIS — E87.70 FLUID OVERLOAD, UNSPECIFIED: ICD-10-CM

## 2023-09-10 LAB
ALBUMIN SERPL ELPH-MCNC: 3.7 G/DL — SIGNIFICANT CHANGE UP (ref 3.3–5)
ALP SERPL-CCNC: 122 U/L — HIGH (ref 40–120)
ALT FLD-CCNC: <5 U/L — LOW (ref 10–45)
ANION GAP SERPL CALC-SCNC: 19 MMOL/L — HIGH (ref 5–17)
ANISOCYTOSIS BLD QL: SLIGHT — SIGNIFICANT CHANGE UP
APTT BLD: 36.3 SEC — HIGH (ref 24.5–35.6)
AST SERPL-CCNC: 15 U/L — SIGNIFICANT CHANGE UP (ref 10–40)
BASE EXCESS BLDV CALC-SCNC: 3.3 MMOL/L — HIGH (ref -2–3)
BASOPHILS # BLD AUTO: 0 K/UL — SIGNIFICANT CHANGE UP (ref 0–0.2)
BASOPHILS NFR BLD AUTO: 0 % — SIGNIFICANT CHANGE UP (ref 0–2)
BILIRUB SERPL-MCNC: 0.5 MG/DL — SIGNIFICANT CHANGE UP (ref 0.2–1.2)
BUN SERPL-MCNC: 68 MG/DL — HIGH (ref 7–23)
CA-I SERPL-SCNC: 1.05 MMOL/L — LOW (ref 1.15–1.33)
CALCIUM SERPL-MCNC: 9 MG/DL — SIGNIFICANT CHANGE UP (ref 8.4–10.5)
CHLORIDE BLDV-SCNC: 98 MMOL/L — SIGNIFICANT CHANGE UP (ref 96–108)
CHLORIDE SERPL-SCNC: 94 MMOL/L — LOW (ref 96–108)
CO2 BLDV-SCNC: 29 MMOL/L — HIGH (ref 22–26)
CO2 SERPL-SCNC: 24 MMOL/L — SIGNIFICANT CHANGE UP (ref 22–31)
CREAT SERPL-MCNC: 13.58 MG/DL — HIGH (ref 0.5–1.3)
EGFR: 3 ML/MIN/1.73M2 — LOW
EOSINOPHIL # BLD AUTO: 0 K/UL — SIGNIFICANT CHANGE UP (ref 0–0.5)
EOSINOPHIL NFR BLD AUTO: 0 % — SIGNIFICANT CHANGE UP (ref 0–6)
GAS PNL BLDV: 135 MMOL/L — LOW (ref 136–145)
GAS PNL BLDV: SIGNIFICANT CHANGE UP
GLUCOSE BLDV-MCNC: 97 MG/DL — SIGNIFICANT CHANGE UP (ref 70–99)
GLUCOSE SERPL-MCNC: 99 MG/DL — SIGNIFICANT CHANGE UP (ref 70–99)
HBV SURFACE AG SER-ACNC: SIGNIFICANT CHANGE UP
HCO3 BLDV-SCNC: 28 MMOL/L — SIGNIFICANT CHANGE UP (ref 22–29)
HCT VFR BLD CALC: 25 % — LOW (ref 34.5–45)
HCT VFR BLDA CALC: 25 % — LOW (ref 34.5–46.5)
HGB BLD CALC-MCNC: 8.2 G/DL — LOW (ref 11.7–16.1)
HGB BLD-MCNC: 7.8 G/DL — LOW (ref 11.5–15.5)
HYPOCHROMIA BLD QL: SLIGHT — SIGNIFICANT CHANGE UP
INR BLD: 1.36 RATIO — HIGH (ref 0.85–1.18)
LACTATE BLDV-MCNC: 2 MMOL/L — SIGNIFICANT CHANGE UP (ref 0.5–2)
LYMPHOCYTES # BLD AUTO: 0.67 K/UL — LOW (ref 1–3.3)
LYMPHOCYTES # BLD AUTO: 4.2 % — LOW (ref 13–44)
MANUAL SMEAR VERIFICATION: SIGNIFICANT CHANGE UP
MCHC RBC-ENTMCNC: 28.7 PG — SIGNIFICANT CHANGE UP (ref 27–34)
MCHC RBC-ENTMCNC: 31.2 GM/DL — LOW (ref 32–36)
MCV RBC AUTO: 91.9 FL — SIGNIFICANT CHANGE UP (ref 80–100)
MICROCYTES BLD QL: SLIGHT — SIGNIFICANT CHANGE UP
MONOCYTES # BLD AUTO: 1.34 K/UL — HIGH (ref 0–0.9)
MONOCYTES NFR BLD AUTO: 8.4 % — SIGNIFICANT CHANGE UP (ref 2–14)
NEUTROPHILS # BLD AUTO: 13.94 K/UL — HIGH (ref 1.8–7.4)
NEUTROPHILS NFR BLD AUTO: 87.4 % — HIGH (ref 43–77)
NEUTS HYPERSEG # BLD: PRESENT — SIGNIFICANT CHANGE UP
NT-PROBNP SERPL-SCNC: HIGH PG/ML (ref 0–300)
OVALOCYTES BLD QL SMEAR: SLIGHT — SIGNIFICANT CHANGE UP
PCO2 BLDV: 42 MMHG — SIGNIFICANT CHANGE UP (ref 39–42)
PH BLDV: 7.43 — SIGNIFICANT CHANGE UP (ref 7.32–7.43)
PLAT MORPH BLD: NORMAL — SIGNIFICANT CHANGE UP
PLATELET # BLD AUTO: 147 K/UL — LOW (ref 150–400)
PO2 BLDV: 13 MMHG — LOW (ref 25–45)
POIKILOCYTOSIS BLD QL AUTO: SLIGHT — SIGNIFICANT CHANGE UP
POTASSIUM BLDV-SCNC: 4.8 MMOL/L — SIGNIFICANT CHANGE UP (ref 3.5–5.1)
POTASSIUM SERPL-MCNC: 4.7 MMOL/L — SIGNIFICANT CHANGE UP (ref 3.5–5.3)
POTASSIUM SERPL-SCNC: 4.7 MMOL/L — SIGNIFICANT CHANGE UP (ref 3.5–5.3)
PROT SERPL-MCNC: 8.4 G/DL — HIGH (ref 6–8.3)
PROTHROM AB SERPL-ACNC: 14.2 SEC — HIGH (ref 9.5–13)
RBC # BLD: 2.72 M/UL — LOW (ref 3.8–5.2)
RBC # FLD: 16.2 % — HIGH (ref 10.3–14.5)
RBC BLD AUTO: ABNORMAL
SAO2 % BLDV: 16.8 % — LOW (ref 67–88)
SCHISTOCYTES BLD QL AUTO: SLIGHT — SIGNIFICANT CHANGE UP
SODIUM SERPL-SCNC: 137 MMOL/L — SIGNIFICANT CHANGE UP (ref 135–145)
TROPONIN T, HIGH SENSITIVITY RESULT: 103 NG/L — HIGH (ref 0–51)
TROPONIN T, HIGH SENSITIVITY RESULT: 116 NG/L — HIGH (ref 0–51)
WBC # BLD: 15.95 K/UL — HIGH (ref 3.8–10.5)
WBC # FLD AUTO: 15.95 K/UL — HIGH (ref 3.8–10.5)

## 2023-09-10 PROCEDURE — 71045 X-RAY EXAM CHEST 1 VIEW: CPT | Mod: 26

## 2023-09-10 PROCEDURE — 99223 1ST HOSP IP/OBS HIGH 75: CPT

## 2023-09-10 PROCEDURE — 99291 CRITICAL CARE FIRST HOUR: CPT

## 2023-09-10 RX ORDER — ONDANSETRON 8 MG/1
4 TABLET, FILM COATED ORAL EVERY 8 HOURS
Refills: 0 | Status: DISCONTINUED | OUTPATIENT
Start: 2023-09-10 | End: 2023-09-13

## 2023-09-10 RX ORDER — LANOLIN ALCOHOL/MO/W.PET/CERES
3 CREAM (GRAM) TOPICAL AT BEDTIME
Refills: 0 | Status: DISCONTINUED | OUTPATIENT
Start: 2023-09-10 | End: 2023-09-13

## 2023-09-10 RX ORDER — ATORVASTATIN CALCIUM 80 MG/1
40 TABLET, FILM COATED ORAL AT BEDTIME
Refills: 0 | Status: DISCONTINUED | OUTPATIENT
Start: 2023-09-10 | End: 2023-09-13

## 2023-09-10 RX ORDER — HEPARIN SODIUM 5000 [USP'U]/ML
5000 INJECTION INTRAVENOUS; SUBCUTANEOUS EVERY 8 HOURS
Refills: 0 | Status: DISCONTINUED | OUTPATIENT
Start: 2023-09-10 | End: 2023-09-13

## 2023-09-10 RX ORDER — ERYTHROPOIETIN 10000 [IU]/ML
10000 INJECTION, SOLUTION INTRAVENOUS; SUBCUTANEOUS ONCE
Refills: 0 | Status: COMPLETED | OUTPATIENT
Start: 2023-09-10 | End: 2023-09-10

## 2023-09-10 RX ORDER — CEFTRIAXONE 500 MG/1
1000 INJECTION, POWDER, FOR SOLUTION INTRAMUSCULAR; INTRAVENOUS EVERY 24 HOURS
Refills: 0 | Status: DISCONTINUED | OUTPATIENT
Start: 2023-09-10 | End: 2023-09-13

## 2023-09-10 RX ORDER — AZITHROMYCIN 500 MG/1
500 TABLET, FILM COATED ORAL EVERY 24 HOURS
Refills: 0 | Status: DISCONTINUED | OUTPATIENT
Start: 2023-09-10 | End: 2023-09-13

## 2023-09-10 RX ORDER — SEVELAMER CARBONATE 2400 MG/1
800 POWDER, FOR SUSPENSION ORAL EVERY 8 HOURS
Refills: 0 | Status: DISCONTINUED | OUTPATIENT
Start: 2023-09-10 | End: 2023-09-10

## 2023-09-10 RX ORDER — SEVELAMER CARBONATE 2400 MG/1
800 POWDER, FOR SUSPENSION ORAL EVERY 12 HOURS
Refills: 0 | Status: DISCONTINUED | OUTPATIENT
Start: 2023-09-10 | End: 2023-09-13

## 2023-09-10 RX ORDER — ACETAMINOPHEN 500 MG
650 TABLET ORAL EVERY 6 HOURS
Refills: 0 | Status: DISCONTINUED | OUTPATIENT
Start: 2023-09-10 | End: 2023-09-13

## 2023-09-10 RX ADMIN — CEFTRIAXONE 100 MILLIGRAM(S): 500 INJECTION, POWDER, FOR SOLUTION INTRAMUSCULAR; INTRAVENOUS at 22:44

## 2023-09-10 RX ADMIN — ERYTHROPOIETIN 10000 UNIT(S): 10000 INJECTION, SOLUTION INTRAVENOUS; SUBCUTANEOUS at 19:22

## 2023-09-10 NOTE — CONSULT NOTE ADULT - SUBJECTIVE AND OBJECTIVE BOX
[facility] : Progress Note :: [phone], (office [phone]),   Dr Francois / Dr West / Dr Langley / Dr Avilez / Dr Ellis LUNA / Dr Perales / Dr Herron / Dr Dick robertson  _____________________________________________________________________________________________  Patient is a 58y Female whom presented to the hospital with shortness of breath  He has H/O ESRD on  hemodialysis MWF last HD on Friday.   Presented to ED with shortness of breath requiring non-rebreather mask  renal consulted for emergent dialysis     PAST MEDICAL & SURGICAL HISTORY:  ESRD on dialysis      HTN (hypertension)      HLD (hyperlipidemia)      No significant past surgical history        No Known Allergies    Home Medications Reviewed  Hospital Medications:   MEDICATIONS  (STANDING):    SOCIAL HISTORY:  Denies ETOh,Smoking,   FAMILY HISTORY:        VITALS:  T(F): 98.7 (09-10-23 @ 12:37), Max: 98.7 (09-10-23 @ 12:37)  HR: 93 (09-10-23 @ 13:42)  BP: 133/68 (09-10-23 @ 13:42)  RR: 22 (09-10-23 @ 13:42)  SpO2: 100% (09-10-23 @ 13:42)  Wt(kg): --    Height (cm): 177.8 (09-10 @ 12:16)  Weight (kg): 68 (09-10 @ 12:16)  BMI (kg/m2): 21.5 (09-10 @ 12:16)  BSA (m2): 1.85 (09-10 @ 12:16)  PHYSICAL EXAM:  Constitutional: NAD  HEENT: anicteric sclera, oropharynx clear.  Neck: No JVD  Respiratory: bilateral crackles++  Cardiovascular: S1, S2, RRR  Gastrointestinal: BS+, soft, NT/ND  Extremities:  No peripheral edema  Neurological: A/O x 3, no focal deficits  : No CVA tenderness. No rubio.   Skin: No rashes  Vascular Access: LUEAVF with thrill and bruit     LABS:  09-10    137  |  94<L>  |  68<H>  ----------------------------<  99  4.7   |  24  |  13.58<H>    Ca    9.0      10 Sep 2023 12:57    TPro  8.4<H>  /  Alb  3.7  /  TBili  0.5  /  DBili      /  AST  15  /  ALT  <5<L>  /  AlkPhos  122<H>  09-10    Creatinine Trend: 13.58 <--                        7.8    15.95 )-----------( 147      ( 10 Sep 2023 12:57 )             25.0     Urine Studies:  Urinalysis Basic - ( 10 Sep 2023 12:57 )    Color:  / Appearance:  / SG:  / pH:   Gluc: 99 mg/dL / Ketone:   / Bili:  / Urobili:    Blood:  / Protein:  / Nitrite:    Leuk Esterase:  / RBC:  / WBC    Sq Epi:  / Non Sq Epi:  / Bacteria:         RADIOLOGY & ADDITIONAL STUDIES:

## 2023-09-10 NOTE — ED ADULT TRIAGE NOTE - NS ED NURSE DIRECT TO ROOM YN
----- Message from Adeline Asencio sent at 1/24/2018  9:03 AM CST -----  Regarding: lab orders  Patient is coming for flb lab on Fri 1/26 and need orders please.  Thanks!   Yes

## 2023-09-10 NOTE — H&P ADULT - ASSESSMENT
58 year old M with PMH ESRD on HD MWF, HTN, HLD presenting for shortness of breath and fluid overload.

## 2023-09-10 NOTE — ED PROVIDER NOTE - NS ED ROS FT
GEN: (-) fever, (-) fatigue  SKIN: (-) rash, (-) itching  EYES: (-) blurry vision, (-) double vision  ENMT: (-) congestion, (-) sore throat  CV: (-) chest pain, (-) palpitations  RESP: (+) cough, (+) SOB  GI: (-) abdominal pain, (-) nausea/vomiting  : (-) dysuria, (-) hematuria  MSK: (-) back pain, (-) joint pain  NEURO: (-) headache, (-) dizziness  PSYCH: (-) emotional stress

## 2023-09-10 NOTE — H&P ADULT - TIME BILLING
- Ordering, reviewing, and interpreting labs, testing, and imaging.  - Independently obtaining a review of systems and performing a physical exam  - Reviewing consultant documentation  - Counselling and educating patient and family regarding interpretation of aforementioned items and plan of care.

## 2023-09-10 NOTE — H&P ADULT - PROBLEM SELECTOR PLAN 1
Emergent HD   CXR Diffuse bilateral airspace opacities more prominent in the lower lungs. Differential includes pulmonary edema and/or infectious versus inflammatory process.  Pro BNP elevated likely 2/2 to above  P, PTH, Hep panel pending   VANESSA pinzon   Nephrology following Dr Francois    #?CAP  - Unlikely but unclear on chest XRay and pt with cough   - Ceftriaxone azithromycin  - Respiratory labs, sputum cx pending     #AOCD   2/2 to above   Procrit ordered for HD  Will trend Hgb Emergent HD   Respiratory support with high flow, BIPAP as needed  CXR Diffuse bilateral airspace opacities more prominent in the lower lungs. Differential includes pulmonary edema and/or infectious versus inflammatory process.  Pro BNP elevated likely 2/2 to above  P, PTH, Hep panel pending   VANESSA pinzon   Nephrology following Dr Francois    #?CAP  - Unlikely but unclear on chest XRay and pt with cough   - Ceftriaxone azithromycin  - Respiratory labs, sputum cx pending     #AOCD   2/2 to above   Procrit ordered for HD  Will trend Hgb

## 2023-09-10 NOTE — ED PROCEDURE NOTE - PROCEDURE ADDITIONAL DETAILS
Emergency Department Focused Ultrasound performed at patient's bedside for educational purposes. An appropriate follow up study was ordered.

## 2023-09-10 NOTE — H&P ADULT - NSHPREVIEWOFSYSTEMS_GEN_ALL_CORE
Unable to gather full ROS due to pt's shortness of breath but denies general sxs like fever chills body aches.

## 2023-09-10 NOTE — CONSULT NOTE ADULT - ASSESSMENT
Patient is a 58y Female whom presented to the hospital with shortness of breath  He has H/O ESRD on  hemodialysis MWF last HD on Friday.   Presented to ED with shortness of breath requiring non-rebreather mask  renal consulted for emergent dialysis     # ESRD.  HD CENTER: [facility] dialysis  MWF  ACCESS LUEAVF  Fluid overloaded- emergent HD indicated.  BP STABLE  anemic   obtained informed consent- in the chart  placed a call to HD nurse on call answering service to call HD nurse  check hepatitis panel.  procrit on HD   check phos and PTH in HD  resume renvela TD AC  daily BMP  dose meds for EGFR<15MLS/MIN     thanks for the consult   Dr Francois  [phone]

## 2023-09-10 NOTE — H&P ADULT - NSHPPHYSICALEXAM_GEN_ALL_CORE
Vital Signs Last 24 Hrs  T(C): 36.9 (10 Sep 2023 17:47), Max: 37.1 (10 Sep 2023 12:37)  T(F): 98.4 (10 Sep 2023 17:47), Max: 98.7 (10 Sep 2023 12:37)  HR: 84 (10 Sep 2023 17:47) (84 - 95)  BP: 110/64 (10 Sep 2023 17:47) (110/64 - 150/74)  BP(mean): 79 (10 Sep 2023 17:47) (79 - 99)  RR: 20 (10 Sep 2023 17:47) (20 - 92)  SpO2: 99% (10 Sep 2023 17:47) (80% - 100%)    Parameters below as of 10 Sep 2023 17:47  Patient On (Oxygen Delivery Method): BiPAP/CPAP        CONSTITUTIONAL: Well-groomed, labored breathing but able to complete sentences   EYES: No conjunctival or scleral injection  ENMT: No external nasal lesions  NECK: Trachea midline   RESPIRATORY: Short of breath. Decreased sounds at bases.   CARDIOVASCULAR: +S1S2, RRR, no M/G/R; pedal pulses full and symmetric; no lower extremity edema  GASTROINTESTINAL: No palpable masses or tenderness  SKIN: No rashes or ulcers noted  NEUROLOGIC: Sensation intact in LEs b/l to light touch  PSYCHIATRIC: A+O x 3

## 2023-09-10 NOTE — H&P ADULT - PROBLEM SELECTOR PLAN 5
DVT ppx: Heparin   Diet: Renal  Dispo: pending improvement  Pt's pharmacy closed, unable to gather full list at this time. Med rec in AM

## 2023-09-10 NOTE — H&P ADULT - NSHPLABSRESULTS_GEN_ALL_CORE
7.8    15.95 )-----------( 147      ( 10 Sep 2023 12:57 )             25.0       09-10    137  |  94<L>  |  68<H>  ----------------------------<  99  4.7   |  24  |  13.58<H>    Ca    9.0      10 Sep 2023 12:57  Phos  3.9     09-10    TPro  8.4<H>  /  Alb  3.7  /  TBili  0.5  /  DBili  x   /  AST  15  /  ALT  <5<L>  /  AlkPhos  122<H>  09-10      PT/INR - ( 10 Sep 2023 12:57 )   PT: 14.2 sec;   INR: 1.36 ratio         PTT - ( 10 Sep 2023 12:57 )  PTT:36.3 sec            Troponin T, High Sensitivity Result: 103 ng/L (09-10-23 @ 15:52)  Troponin T, High Sensitivity Result: 116 ng/L (09-10-23 @ 12:57)

## 2023-09-10 NOTE — H&P ADULT - HISTORY OF PRESENT ILLNESS
58 year old M with PMH ESRD on HD MWF, HTN, HLD presenting for shortness of breath. Pt reports feeling less shortness of breath than when he presented and reports having a cough that has resolved. In ED pt placed on BIPAP for respiratory support. He states that he is not staying. Per notes pt had HD done last friday on schedule. He denies fever, chills, body aches.  CXR showing b/l airspace opacities in lower lung fields. Pt is able to complete full sentences but appears to be short of breath. HPI ROS limited.

## 2023-09-10 NOTE — ED PROVIDER NOTE - OBJECTIVE STATEMENT
58-year-old male, history of ESRD on HD MWF, HTN, HLD, presenting with shortness of breath.  Patient received last dialysis session on Friday, no complications.  Endorses gradually progressive shortness of breath associated with dry cough since Friday.  Additionally endorses associated chills.  Denies chest pain, fevers, nausea or vomiting, leg pain or swelling.  Not on blood thinners.  Endorses need for BiPAP on prior admissions.

## 2023-09-10 NOTE — ED ADULT NURSE REASSESSMENT NOTE - NS ED NURSE REASSESS COMMENT FT1
Pt now awake, RN placed pt back on nasal cannula 6L, O2 sat 88%-91%, pt placed back on non rebreather 10L. MD aware, pt to be placed on BiPAP

## 2023-09-10 NOTE — ED ADULT NURSE REASSESSMENT NOTE - NS ED NURSE REASSESS COMMENT FT1
Pt ripping off BiPAP, stating he is hungry and wants to leave. MD Warner at bedside, per MD pt okay to be given sandwich and put back on nasal cannula for now. Awaiting admission order

## 2023-09-10 NOTE — ED PROVIDER NOTE - PHYSICAL EXAMINATION
GEN: awake and alert; mild respiratory distress/tachypnea, satting 93-94% on 4LNC  SKIN: (+) warm/dry, (-) cyanosis, (-) rash  HEAD: (-) scalp swelling, (-) tenderness  EYES: (-) conjunctival pallor, (-) scleral icterus  ENMT: (+) moist mucous membranes, (+) airway patent, (-) stridor  NECK: (-) tenderness, (-) stiffness  CV: (+) RRR, (-) murmurs/rubs/gallops  RESP: (+) mildly increased WOB, (+) diffuse rales, (-) rhonchi, (-) wheezing  ABD: (+) soft, (-) tenderness, (-) guarding  EXT: (-) joint deformities, (-) edema, (-) tenderness, (+) grossly intact ROM, (+) equal pulses in upper & lower extremities  NEURO: mental status as above, (-) gross strength/sensation deficits

## 2023-09-10 NOTE — ED ADULT NURSE REASSESSMENT NOTE - NS ED NURSE REASSESS COMMENT FT1
Pt desating to 85% on 5L nasal cannula when sleeping, MD Maldonado and MD Paredes at bedside. Pt switched to 10L nonrebreather, now sating 95%.

## 2023-09-10 NOTE — ED PROVIDER NOTE - CLINICAL SUMMARY MEDICAL DECISION MAKING FREE TEXT BOX
I was the supervising attending. I have independently seen face-to-face and examined the patient. I have reviewed the history and physical and discussed the MDM with the resident, fellow, URSULA and/or student. I agree with the assessment and plan as presented unless otherwise documented as follows:    58-year-old male, ESRD on dialysis, HTN, HLD, presenting with gradually progressive SOB since last dialysis session.  Appears mildly tachypneic, requiring nasal cannula but speaking in full sentences.  POCUS shows diffuse B-lines with no significant pleural effusions, plethoric IVC.  Impression likely pulmonary edema.  Maintaining acceptable O2 sat on nasal cannula and without significantly increased work of breathing, therefore will continue to monitor closely and hold on BiPAP for now.  Will obtain blood work, chest x-ray, anticipate admission for urgent dialysis. -Annabel Maldonado MD (Attending)

## 2023-09-10 NOTE — ED PROVIDER NOTE - PROGRESS NOTE DETAILS
Information reviewed in likely duplicate chart, MRN [id number], patient with multiple visits for SOB/missed dialysis, most recently was evaluated here 9/5 and was recommended for admission but AMA'ed. -Annabel Maldonado MD (Attending) Labs reviewed, notable for leukocytosis. Anemia and Cr similar to prior values in duplicate chart. XR shows bilateral opacities, likely pulmonary edema. Spoke to covering doctor for patient's nephrologist, aware and will arrange for dialysis. Signed out at 3pm pending reassessment and admission for acute hypoxemic respiratory failure 2/2 pulmonary edema/missed dialysis. -Annabel Maldonado MD (Attending)

## 2023-09-10 NOTE — ED ADULT NURSE NOTE - OBJECTIVE STATEMENT
Pt is a 57 y/o female pmhx of ESRD (on dialysis MFW), HLD & HTN presents to the ED c/o of cough & SOB. Pt states he has had symptoms since dialysis on Friday. Pt presents alert & oriented x 4, calm, able to follow commands. Breathing spontaneous & slightly tachypneic, sating 80% on room air, crackles heard throughout all lung fields. Pt placed on 5L nasal cannula. On cardiac monitor, NSR 70. Abdomen soft & nondistended. Strength 5/5 x 4 extremities, ambulates without assist. No peripheral edema noted. Skin warm, clean, dry & intact. Denies chest pain, sick contacts, abdominal pain, back pain, n/v/d, fever, changes in vision. Pt is an everyday smoker. Call bell within reach and pt instructed on how to use, bed in lowest position, side rails up, wheels locked.

## 2023-09-11 DIAGNOSIS — J96.01 ACUTE RESPIRATORY FAILURE WITH HYPOXIA: ICD-10-CM

## 2023-09-11 LAB
ANION GAP SERPL CALC-SCNC: 16 MMOL/L — SIGNIFICANT CHANGE UP (ref 5–17)
BASE EXCESS BLDA CALC-SCNC: 7.7 MMOL/L — HIGH (ref -2–3)
BUN SERPL-MCNC: 36 MG/DL — HIGH (ref 7–23)
CALCIUM SERPL-MCNC: 8.8 MG/DL — SIGNIFICANT CHANGE UP (ref 8.4–10.5)
CHLORIDE SERPL-SCNC: 93 MMOL/L — LOW (ref 96–108)
CO2 BLDA-SCNC: 32 MMOL/L — HIGH (ref 19–24)
CO2 SERPL-SCNC: 27 MMOL/L — SIGNIFICANT CHANGE UP (ref 22–31)
CREAT SERPL-MCNC: 8.5 MG/DL — HIGH (ref 0.5–1.3)
EGFR: 7 ML/MIN/1.73M2 — LOW
GAS PNL BLDA: SIGNIFICANT CHANGE UP
GLUCOSE SERPL-MCNC: 105 MG/DL — HIGH (ref 70–99)
HBV SURFACE AB SER-ACNC: REACTIVE
HCO3 BLDA-SCNC: 31 MMOL/L — HIGH (ref 21–28)
HCT VFR BLD CALC: 23.3 % — LOW (ref 39–50)
HGB BLD-MCNC: 7.4 G/DL — LOW (ref 13–17)
MAGNESIUM SERPL-MCNC: 2 MG/DL — SIGNIFICANT CHANGE UP (ref 1.6–2.6)
MCHC RBC-ENTMCNC: 28.5 PG — SIGNIFICANT CHANGE UP (ref 27–34)
MCHC RBC-ENTMCNC: 31.8 GM/DL — LOW (ref 32–36)
MCV RBC AUTO: 89.6 FL — SIGNIFICANT CHANGE UP (ref 80–100)
MRSA PCR RESULT.: SIGNIFICANT CHANGE UP
NRBC # BLD: 0 /100 WBCS — SIGNIFICANT CHANGE UP (ref 0–0)
PCO2 BLDA: 38 MMHG — SIGNIFICANT CHANGE UP (ref 35–48)
PH BLDA: 7.52 — HIGH (ref 7.35–7.45)
PHOSPHATE SERPL-MCNC: 3.6 MG/DL — SIGNIFICANT CHANGE UP (ref 2.5–4.5)
PLATELET # BLD AUTO: 176 K/UL — SIGNIFICANT CHANGE UP (ref 150–400)
PO2 BLDA: 62 MMHG — LOW (ref 83–108)
POTASSIUM SERPL-MCNC: 3.9 MMOL/L — SIGNIFICANT CHANGE UP (ref 3.5–5.3)
POTASSIUM SERPL-SCNC: 3.9 MMOL/L — SIGNIFICANT CHANGE UP (ref 3.5–5.3)
PTH-INTACT FLD-MCNC: 324 PG/ML — HIGH (ref 15–65)
RAPID RVP RESULT: SIGNIFICANT CHANGE UP
RBC # BLD: 2.6 M/UL — LOW (ref 4.2–5.8)
RBC # FLD: 16 % — HIGH (ref 10.3–14.5)
S AUREUS DNA NOSE QL NAA+PROBE: SIGNIFICANT CHANGE UP
SAO2 % BLDA: 95.5 % — SIGNIFICANT CHANGE UP (ref 94–98)
SARS-COV-2 RNA SPEC QL NAA+PROBE: SIGNIFICANT CHANGE UP
SODIUM SERPL-SCNC: 136 MMOL/L — SIGNIFICANT CHANGE UP (ref 135–145)
WBC # BLD: 11.55 K/UL — HIGH (ref 3.8–10.5)
WBC # FLD AUTO: 11.55 K/UL — HIGH (ref 3.8–10.5)

## 2023-09-11 PROCEDURE — 99233 SBSQ HOSP IP/OBS HIGH 50: CPT

## 2023-09-11 PROCEDURE — 93306 TTE W/DOPPLER COMPLETE: CPT | Mod: 26

## 2023-09-11 PROCEDURE — 71250 CT THORAX DX C-: CPT | Mod: 26

## 2023-09-11 RX ORDER — NICOTINE POLACRILEX 2 MG
1 GUM BUCCAL DAILY
Refills: 0 | Status: DISCONTINUED | OUTPATIENT
Start: 2023-09-11 | End: 2023-09-13

## 2023-09-11 RX ORDER — CHLORHEXIDINE GLUCONATE 213 G/1000ML
1 SOLUTION TOPICAL
Refills: 0 | Status: DISCONTINUED | OUTPATIENT
Start: 2023-09-11 | End: 2023-09-13

## 2023-09-11 RX ORDER — LABETALOL HCL 100 MG
300 TABLET ORAL EVERY 12 HOURS
Refills: 0 | Status: DISCONTINUED | OUTPATIENT
Start: 2023-09-11 | End: 2023-09-13

## 2023-09-11 RX ADMIN — CEFTRIAXONE 100 MILLIGRAM(S): 500 INJECTION, POWDER, FOR SOLUTION INTRAMUSCULAR; INTRAVENOUS at 22:17

## 2023-09-11 RX ADMIN — AZITHROMYCIN 255 MILLIGRAM(S): 500 TABLET, FILM COATED ORAL at 00:48

## 2023-09-11 RX ADMIN — ATORVASTATIN CALCIUM 40 MILLIGRAM(S): 80 TABLET, FILM COATED ORAL at 21:35

## 2023-09-11 RX ADMIN — ATORVASTATIN CALCIUM 40 MILLIGRAM(S): 80 TABLET, FILM COATED ORAL at 00:32

## 2023-09-11 RX ADMIN — SEVELAMER CARBONATE 800 MILLIGRAM(S): 2400 POWDER, FOR SUSPENSION ORAL at 05:37

## 2023-09-11 RX ADMIN — Medication 1 PATCH: at 21:25

## 2023-09-11 RX ADMIN — HEPARIN SODIUM 5000 UNIT(S): 5000 INJECTION INTRAVENOUS; SUBCUTANEOUS at 00:32

## 2023-09-11 RX ADMIN — Medication 300 MILLIGRAM(S): at 12:04

## 2023-09-11 RX ADMIN — Medication 1 PATCH: at 17:05

## 2023-09-11 RX ADMIN — Medication 300 MILLIGRAM(S): at 21:35

## 2023-09-11 NOTE — PATIENT PROFILE ADULT - FUNCTIONAL ASSESSMENT - DAILY ACTIVITY 2.
-- DO NOT REPLY / DO NOT REPLY ALL --  -- Message is from the [facility]--    COVID-19 Universal Screening: N/A - Not about scheduling    General Patient Message      Reason for Call: patient stated she has appointment for cardiologist on 4/1 and she has appointment with you 1/26. Also left message to go to lab. Please call back to assist    Caller Information       Type Contact Phone    01/15/2021 11:09 AM CST Phone (Incoming) Rae Neal (Self) [phone] (H)          Alternative phone number: n    Turnaround time given to caller:   \"This message will be sent to [state Provider's name]. The clinical team will fulfill your request as soon as they review your message.\"    
3 = A little assistance

## 2023-09-11 NOTE — PATIENT PROFILE ADULT - FALL HARM RISK - HARM RISK INTERVENTIONS

## 2023-09-11 NOTE — CONSULT NOTE ADULT - SUBJECTIVE AND OBJECTIVE BOX
DATE OF SERVICE: 09-11-23 @ 17:36    CHIEF COMPLAINT:Patient is a 58y old  Male who presents with a chief complaint of shortness of breath (11 Sep 2023 12:45)      HISTORY OF PRESENT ILLNESS:HPI:  58 year old M with PMH ESRD on HD MWF, HTN, HLD presenting for shortness of breath. Pt reports feeling less shortness of breath than when he presented and reports having a cough that has resolved. In ED pt placed on BIPAP for respiratory support. He states that he is not staying. Per notes pt had HD done last friday on schedule. He denies fever, chills, body aches.  CXR showing b/l airspace opacities in lower lung fields. Pt is able to complete full sentences but appears to be short of breath. HPI ROS limited.  (10 Sep 2023 18:35)      PAST MEDICAL & SURGICAL HISTORY:  ESRD on dialysis      HTN (hypertension)      HLD (hyperlipidemia)      No significant past surgical history              MEDICATIONS:  heparin   Injectable 5000 Unit(s) SubCutaneous every 8 hours  labetalol 300 milliGRAM(s) Oral every 12 hours    azithromycin  IVPB 500 milliGRAM(s) IV Intermittent every 24 hours  cefTRIAXone   IVPB 1000 milliGRAM(s) IV Intermittent every 24 hours      acetaminophen     Tablet .. 650 milliGRAM(s) Oral every 6 hours PRN  melatonin 3 milliGRAM(s) Oral at bedtime PRN  ondansetron Injectable 4 milliGRAM(s) IV Push every 8 hours PRN    aluminum hydroxide/magnesium hydroxide/simethicone Suspension 30 milliLiter(s) Oral every 4 hours PRN    atorvastatin 40 milliGRAM(s) Oral at bedtime    chlorhexidine 2% Cloths 1 Application(s) Topical <User Schedule>      FAMILY HISTORY:      Non-contributory    SOCIAL HISTORY:    [ ] Tobacco  [ ] Drugs  [ ] Alcohol    Allergies    No Known Allergies    Intolerances    	    REVIEW OF SYSTEMS:  CONSTITUTIONAL: No fever  EYES: No eye pain, visual disturbances, or discharge  ENMT:  No difficulty hearing, tinnitus  NECK: No pain or stiffness  RESPIRATORY: No cough, wheezing,  CARDIOVASCULAR: No chest pain, palpitations, passing out, dizziness, or leg swelling  GASTROINTESTINAL:  No nausea, vomiting, diarrhea or constipation. No melena.  GENITOURINARY: No dysuria, hematuria  NEUROLOGICAL: No stroke like symptoms  SKIN: No burning or lesions   ENDOCRINE: No heat or cold intolerance  MUSCULOSKELETAL: No joint pain or swelling  PSYCHIATRIC: No  anxiety, mood swings  HEME/LYMPH: No bleeding gums  ALLERGY AND IMMUNOLOGIC: No hives or eczema	    All other ROS negative    PHYSICAL EXAM:  T(C): 37.1 (09-11-23 @ 11:14), Max: 37.7 (09-11-23 @ 03:45)  HR: 84 (09-11-23 @ 11:14) (69 - 97)  BP: 136/76 (09-11-23 @ 11:14) (110/64 - 148/75)  RR: 20 (09-11-23 @ 13:15) (18 - 22)  SpO2: 98% (09-11-23 @ 13:15) (95% - 100%)  Wt(kg): --  I&O's Summary    10 Sep 2023 07:01  -  11 Sep 2023 07:00  --------------------------------------------------------  IN: 0 mL / OUT: 3400 mL / NET: -3400 mL        Appearance: Normal	  HEENT:   Normal oral mucosa, EOMI	  Cardiovascular:  S1 S2, No JVD,    Respiratory: Lungs clear to auscultation	  Psychiatry: Alert  Gastrointestinal:  Soft, Non-tender, + BS	  Skin: No rashes   Neurologic: Non-focal  Extremities:  No edema  Vascular: Peripheral pulses palpable    	    	  	  CARDIAC MARKERS:  Labs personally reviewed by me                                  7.4    11.55 )-----------( 176      ( 11 Sep 2023 06:20 )             23.3     09-11    136  |  93<L>  |  36<H>  ----------------------------<  105<H>  3.9   |  27  |  8.50<H>    Ca    8.8      11 Sep 2023 06:20  Phos  3.6     09-11  Mg     2.0     09-11    TPro  8.4<H>  /  Alb  3.7  /  TBili  0.5  /  DBili  x   /  AST  15  /  ALT  <5<L>  /  AlkPhos  122<H>  09-10          EKG: Personally reviewed by me - NSR LVH pattern   Radiology: Personally reviewed by me - < from: CT Chest No Cont (09.11.23 @ 13:10) >  Findings likely represent a combination of fluid overload and pneumonia.  Extensive bilateral renal cysts, some of which are indeterminate.   Ultrasound recommended for complete evaluation.    < end of copied text >        Assessment /Plan:   58 year old M with PMH ESRD on HD MWF, HTN, HLD presenting for shortness of breath and fluid overload.      Problem/Plan - 1:  ·  Problem: Acute respiratory failure with hypoxia.   ·  Plan: - due to bacterial pneumonia which precipitated volume overload state, on HD; concern for acute CHF exacerbation; R/O COVID/ viral illness  - s/p emergent HD on 9/10, HD M/W/F (of note: patient did not miss any HD sessions)  - on high flow-- obtain ABG and will transition to cpap given pulm edema noted on CXR   - CT chest ordered STAT to further evaluate pna and/or volume overload  - c/w ceftriaxone and azithromax day 2 (start 9/10)  - blood cultures pending  - RVP ordered STAT on 9/11  - pro bnp >90,000-- TTE 9/11, moderate LVH, preserved EF, grade 2 diastolic dysfunction no WMA   - Volume overload most likely d/t infectious process   -  Strict I/O  - Volume management with HD   - renal following      Problem/Plan - 2:  ·  Problem: ESRD on dialysis.   ·  Plan: - s/p emergency HD session on 9/10  - resume HD M/W/F  - renal following.    Problem/Plan - 3:  ·  Problem: HTN (hypertension).   ·  Plan: - bp elevated  - c/w labetolol  -start home lisinopril and hydralazine     Problem/Plan - 4:  ·  Problem: HLD (hyperlipidemia).   ·  Plan: - c/w statin.    Problem/Plan - 5:  ·  Problem: Prophylactic measure.   ·  Plan: - DVT ppx: Heparin.        Differential diagnosis and plan of care discussed with patient after the evaluation. Counseling on diet, nutritional counseling, weight management, exercise and medication compliance was done.   Advanced care planning/advanced directives discussed with patient/family. DNR status including forceful chest compressions to attempt to restart the heart, ventilator support/artificial breathing, electric shock, artificial nutrition, health care proxy, Molst form all discussed with pt. Pt wishes to consider. More than fifteen minutes spent on discussing advanced directives.     YAEL Matias DO [facility]  Cardiovascular Medicine  [address] [place] [address], [address]  Office [phone]  Available via call or text on [employer] Teams  DATE OF SERVICE: 09-11-23 @ 17:36    CHIEF COMPLAINT:Patient is a 58y old  Male who presents with a chief complaint of shortness of breath (11 Sep 2023 12:45)      HISTORY OF PRESENT ILLNESS:HPI:  58 year old M with PMH ESRD on HD MWF, HTN, HLD presenting for shortness of breath. Pt reports feeling less shortness of breath than when he presented and reports having a cough that has resolved. In ED pt placed on BIPAP for respiratory support. He states that he is not staying. Per notes pt had HD done last friday on schedule. He denies fever, chills, body aches.  CXR showing b/l airspace opacities in lower lung fields. Pt is able to complete full sentences but appears to be short of breath. HPI ROS limited.  (10 Sep 2023 18:35)      PAST MEDICAL & SURGICAL HISTORY:  ESRD on dialysis      HTN (hypertension)      HLD (hyperlipidemia)      No significant past surgical history              MEDICATIONS:  heparin   Injectable 5000 Unit(s) SubCutaneous every 8 hours  labetalol 300 milliGRAM(s) Oral every 12 hours    azithromycin  IVPB 500 milliGRAM(s) IV Intermittent every 24 hours  cefTRIAXone   IVPB 1000 milliGRAM(s) IV Intermittent every 24 hours      acetaminophen     Tablet .. 650 milliGRAM(s) Oral every 6 hours PRN  melatonin 3 milliGRAM(s) Oral at bedtime PRN  ondansetron Injectable 4 milliGRAM(s) IV Push every 8 hours PRN    aluminum hydroxide/magnesium hydroxide/simethicone Suspension 30 milliLiter(s) Oral every 4 hours PRN    atorvastatin 40 milliGRAM(s) Oral at bedtime    chlorhexidine 2% Cloths 1 Application(s) Topical <User Schedule>      FAMILY HISTORY:      Non-contributory    SOCIAL HISTORY:    [ ] not a smoker    Allergies    No Known Allergies    Intolerances    	    REVIEW OF SYSTEMS:  CONSTITUTIONAL: No fever  EYES: No eye pain, visual disturbances, or discharge  ENMT:  No difficulty hearing, tinnitus  NECK: No pain or stiffness  RESPIRATORY: No cough, wheezing,  CARDIOVASCULAR: No chest pain, palpitations, passing out, dizziness, or leg swelling  GASTROINTESTINAL:  No nausea, vomiting, diarrhea or constipation. No melena.  GENITOURINARY: No dysuria, hematuria  NEUROLOGICAL: No stroke like symptoms  SKIN: No burning or lesions   ENDOCRINE: No heat or cold intolerance  MUSCULOSKELETAL: No joint pain or swelling  PSYCHIATRIC: No  anxiety, mood swings  HEME/LYMPH: No bleeding gums  ALLERGY AND IMMUNOLOGIC: No hives or eczema	    All other ROS negative    PHYSICAL EXAM:  T(C): 37.1 (09-11-23 @ 11:14), Max: 37.7 (09-11-23 @ 03:45)  HR: 84 (09-11-23 @ 11:14) (69 - 97)  BP: 136/76 (09-11-23 @ 11:14) (110/64 - 148/75)  RR: 20 (09-11-23 @ 13:15) (18 - 22)  SpO2: 98% (09-11-23 @ 13:15) (95% - 100%)  Wt(kg): --  I&O's Summary    10 Sep 2023 07:01  -  11 Sep 2023 07:00  --------------------------------------------------------  IN: 0 mL / OUT: 3400 mL / NET: -3400 mL        Appearance: Normal	  HEENT:   Normal oral mucosa, EOMI	  Cardiovascular:  S1 S2, No JVD,    Respiratory: Lungs clear to auscultation	  Psychiatry: Alert  Gastrointestinal:  Soft, Non-tender, + BS	  Skin: No rashes   Neurologic: Non-focal  Extremities:  No edema  Vascular: Peripheral pulses palpable    	    	  	  CARDIAC MARKERS:  Labs personally reviewed by me                                  7.4    11.55 )-----------( 176      ( 11 Sep 2023 06:20 )             23.3     09-11    136  |  93<L>  |  36<H>  ----------------------------<  105<H>  3.9   |  27  |  8.50<H>    Ca    8.8      11 Sep 2023 06:20  Phos  3.6     09-11  Mg     2.0     09-11    TPro  8.4<H>  /  Alb  3.7  /  TBili  0.5  /  DBili  x   /  AST  15  /  ALT  <5<L>  /  AlkPhos  122<H>  09-10          EKG: Personally reviewed by me - NSR LVH pattern   Radiology: Personally reviewed by me - < from: CT Chest No Cont (09.11.23 @ 13:10) >  Findings likely represent a combination of fluid overload and pneumonia.  Extensive bilateral renal cysts, some of which are indeterminate.   Ultrasound recommended for complete evaluation.    < end of copied text >        Assessment /Plan:   58 year old M with PMH ESRD on HD MWF, HTN, HLD presenting for shortness of breath and fluid overload.      Problem/Plan - 1:  ·  Problem: Acute respiratory failure with hypoxia.   ·  Plan: Due to bacterial pneumonia which precipitated volume overload state,    - s/p emergent HD on 9/10, HD M/W/F (of note: patient did not miss any HD sessions)  - Continued Volume removal with HD  - TTE with moderate LVH and preserved EF    Problem/Plan - 2:  ·  Problem: HTN (hypertension).   ·  Plan: - bp elevated  - c/w labetalol  -start home lisinopril and hydralazine as BP tolerates     Problem/Plan - 3:  ·  Problem: HLD (hyperlipidemia).   ·  Plan: - c/w statin.    Problem/Plan - 4:  ·  Problem: Prophylactic measure.   ·  Plan: - DVT ppx: Heparin.        Differential diagnosis and plan of care discussed with patient after the evaluation. Counseling on diet, nutritional counseling, weight management, exercise and medication compliance was done.   Advanced care planning/advanced directives discussed with patient/family. DNR status including forceful chest compressions to attempt to restart the heart, ventilator support/artificial breathing, electric shock, artificial nutrition, health care proxy, Molst form all discussed with pt. Pt wishes to consider. More than fifteen minutes spent on discussing advanced directives.     YAEL Matias DO [facility]  Cardiovascular Medicine  [address] [place] [address], [address]  Office [phone]  Available via call or text on Microsoft Teams

## 2023-09-11 NOTE — PHARMACOTHERAPY INTERVENTION NOTE - COMMENTS
Performed medication reconciliation and home medication list updated in prescription writer/ outpatient medication review. Medications verified with pharmacy.     Home Medications:  amLODIPine 10 mg oral tablet: 1 tab(s) orally once a day   ergocalciferol 1.25 mg (50,000 intl units) oral capsule: 1 cap(s) orally once a week   gabapentin 300 mg oral tablet: orally once a day (at bedtime)   hydrALAZINE 100 mg oral tablet: 1 tab(s) orally 3 times a day   labetalol 300 mg oral tablet: 1 tab(s) orally 3 times a day   lisinopril 40 mg oral tablet: 1 tab(s) orally once a day   Sensipar 30 mg oral tablet: 1 tab(s) orally once a day       Recommendations: Patient is on Gabapentin 300 mg once daily at bedtime. As patient is receiving hemodialysis, Gabapentin 100 mg once daily at bedtime is recommended.    Stephany Valladares Johns PharmD Candidate 2024     Performed medication reconciliation and home medication list updated in prescription writer/ outpatient medication review. Medications verified with pt and dosages confirmed with [facility] pharmacy.     Home Medications:  amLODIPine 10 mg oral tablet: 1 tab(s) orally once a day   ergocalciferol 1.25 mg (50,000 intl units) oral capsule: 1 cap(s) orally once a week   gabapentin 300 mg oral tablet: orally once a day (at bedtime)   hydrALAZINE 100 mg oral tablet: 1 tab(s) orally 3 times a day   labetalol 300 mg oral tablet: 1 tab(s) orally 3 times a day   lisinopril 40 mg oral tablet: 1 tab(s) orally once a day   Sensipar 30 mg oral tablet: 1 tab(s) orally once a day     Recommendations: Patient is on Gabapentin 300 mg once daily at bedtime, not currently resumed here. If were to resume, would recommend cutting dose down to 100mg daily to dose adjust for hemodialysis.    Stephany Valladares Johns PharmD Candidate 2024    Beverly Joseph, PharmD, BCPS  Clinical Pharmacy Specialist  Teams (preferred) or [phone]

## 2023-09-11 NOTE — PATIENT PROFILE ADULT - FUNCTIONAL ASSESSMENT - BASIC MOBILITY SECTION LABEL
.
lives w/ wife in private home, one step to enter and flight of stairs to bedroom, does not use assistive device indoors,  glasses for distance and reading, hearing good, R handed,  Pt's wife present at bedside, confirmed pt was independent w/all functional mobility, ADLs/IADLs and used [place] for long walks, stopped driving several months ago.

## 2023-09-11 NOTE — PROGRESS NOTE ADULT - SUBJECTIVE AND OBJECTIVE BOX
[facility] - ОЛЬГА Herron / ОЛЬГА Avilez / IONCENCIA Perales/ ОЛЬГА Corral/ ОЛЬГА Langley/ SUKHWINDER West / NAHUM Francois / ISMAEL Rose  ---------------------------------------------------------------------------------------------------------------  seen and examined today for ESRD  Interval : NAD  VITALS:  T(F): 98.8 (09-11-23 @ 11:14), Max: 99.9 (09-11-23 @ 03:45)  HR: 84 (09-11-23 @ 11:14)  BP: 136/76 (09-11-23 @ 11:14)  RR: 22 (09-11-23 @ 11:14)  SpO2: 100% (09-11-23 @ 11:14)  Wt(kg): --    09-10 @ 07:01  -  09-11 @ 07:00  --------------------------------------------------------  IN: 0 mL / OUT: 3400 mL / NET: -3400 mL      Physical Exam :-  Constitutional: NAD  Neck: Supple.  Respiratory: Bilateral crackles at bases  Cardiovascular: S1, S2 normal,  Gastrointestinal: Bowel Sounds present, soft, non tender.  Extremities: No edema  Neurological: Alert and Oriented x 3, no focal deficits  Psychiatric: Normal mood, normal affect  Data:-  Allergies :   No Known Allergies    Hospital Medications:   MEDICATIONS  (STANDING):  atorvastatin 40 milliGRAM(s) Oral at bedtime  azithromycin  IVPB 500 milliGRAM(s) IV Intermittent every 24 hours  cefTRIAXone   IVPB 1000 milliGRAM(s) IV Intermittent every 24 hours  chlorhexidine 2% Cloths 1 Application(s) Topical <User Schedule>  heparin   Injectable 5000 Unit(s) SubCutaneous every 8 hours  labetalol 300 milliGRAM(s) Oral every 12 hours  sevelamer carbonate 800 milliGRAM(s) Oral every 12 hours    09-11    136  |  93<L>  |  36<H>  ----------------------------<  105<H>  3.9   |  27  |  8.50<H>    Ca    8.8      11 Sep 2023 06:20  Phos  3.6     09-11  Mg     2.0     09-11    TPro  8.4<H>  /  Alb  3.7  /  TBili  0.5  /  DBili      /  AST  15  /  ALT  <5<L>  /  AlkPhos  122<H>  09-10    Creatinine Trend: 8.50 <--, 13.58 <--                        7.4    11.55 )-----------( 176      ( 11 Sep 2023 06:20 )             23.3

## 2023-09-11 NOTE — PROGRESS NOTE ADULT - SUBJECTIVE AND OBJECTIVE BOX
remains dyspneic, dry cough.     GENERAL: No fevers, no chills.  EYES: No blurry vision,  No photophobia  ENT: No sore throat.  No dysphagia  Cardiovascular: No chest pain, palpitations, orthopnea  Pulmonary: + cough, no wheezing. + shortness of breath  Gastrointestinal: No abdominal pain, no diarrhea, no constipation.    Musculoskeletal: No weakness.  No myalgias.  Dermatology:  No rashes.  Neuro: No Headache.  No vertigo.  No dizziness.  Psych: No anxiety, no depression.  Denies suicidal thoughts.    MEDICATIONS  (STANDING):  atorvastatin 40 milliGRAM(s) Oral at bedtime  azithromycin  IVPB 500 milliGRAM(s) IV Intermittent every 24 hours  cefTRIAXone   IVPB 1000 milliGRAM(s) IV Intermittent every 24 hours  heparin   Injectable 5000 Unit(s) SubCutaneous every 8 hours  labetalol 300 milliGRAM(s) Oral every 12 hours  sevelamer carbonate 800 milliGRAM(s) Oral every 12 hours    MEDICATIONS  (PRN):  acetaminophen     Tablet .. 650 milliGRAM(s) Oral every 6 hours PRN Temp greater or equal to 38C (100.4F), Mild Pain (1 - 3)  aluminum hydroxide/magnesium hydroxide/simethicone Suspension 30 milliLiter(s) Oral every 4 hours PRN Dyspepsia  melatonin 3 milliGRAM(s) Oral at bedtime PRN Insomnia  ondansetron Injectable 4 milliGRAM(s) IV Push every 8 hours PRN Nausea and/or Vomiting    Vital Signs Last 24 Hrs  T(C): 37.7 (11 Sep 2023 03:45), Max: 37.7 (11 Sep 2023 03:45)  T(F): 99.9 (11 Sep 2023 03:45), Max: 99.9 (11 Sep 2023 03:45)  HR: 97 (11 Sep 2023 03:45) (69 - 97)  BP: 146/78 (11 Sep 2023 03:45) (110/64 - 150/74)  BP(mean): 79 (10 Sep 2023 17:47) (79 - 99)  RR: 20 (11 Sep 2023 09:58) (18 - 92)  SpO2: 95% (11 Sep 2023 09:58) (80% - 100%)    Parameters below as of 11 Sep 2023 09:58  Patient On (Oxygen Delivery Method): nasal cannula, high flow  O2 Flow (L/min): 50  O2 Concentration (%): 50    GENERAL: comfortable on high flow, dyspnea with exertion  HEAD:  Atraumatic, Normocephalic  EYES: EOMI, PERRLA, conjunctiva and sclera clear  ENT: Pharynx not erythematous  PULMONARY: decrease lung sounds b/l bases, did not appreciate crackles   CARDIOVASCULAR: Regular rate and rhythm; No murmurs, rubs, or gallops  ABDOMEN: Soft, Nontender, Nondistended; Bowel sounds present  EXTREMITIES:  2+ Peripheral Pulses, No clubbing, cyanosis; trace edema up to mid shin  MUSCULOSKELETAL: No calf tenderness  PSYCH: AAOx3, normal affect  SKIN: warm and dry, No rashes or lesions    .  LABS:                         7.4    11.55 )-----------( 176      ( 11 Sep 2023 06:20 )             23.3     09-11    136  |  93<L>  |  36<H>  ----------------------------<  105<H>  3.9   |  27  |  8.50<H>    Ca    8.8      11 Sep 2023 06:20  Phos  3.6     09-11  Mg     2.0     09-11    TPro  8.4<H>  /  Alb  3.7  /  TBili  0.5  /  DBili  x   /  AST  15  /  ALT  <5<L>  /  AlkPhos  122<H>  09-10    PT/INR - ( 10 Sep 2023 12:57 )   PT: 14.2 sec;   INR: 1.36 ratio         PTT - ( 10 Sep 2023 12:57 )  PTT:36.3 sec  Urinalysis Basic - ( 11 Sep 2023 06:20 )    Color: x / Appearance: x / SG: x / pH: x  Gluc: 105 mg/dL / Ketone: x  / Bili: x / Urobili: x   Blood: x / Protein: x / Nitrite: x   Leuk Esterase: x / RBC: x / WBC x   Sq Epi: x / Non Sq Epi: x / Bacteria: x            RADIOLOGY, EKG & ADDITIONAL TESTS: Reviewed.

## 2023-09-12 LAB
ANION GAP SERPL CALC-SCNC: 18 MMOL/L — HIGH (ref 5–17)
BLD GP AB SCN SERPL QL: NEGATIVE — SIGNIFICANT CHANGE UP
BUN SERPL-MCNC: 50 MG/DL — HIGH (ref 7–23)
CALCIUM SERPL-MCNC: 9.8 MG/DL — SIGNIFICANT CHANGE UP (ref 8.4–10.5)
CHLORIDE SERPL-SCNC: 91 MMOL/L — LOW (ref 96–108)
CO2 SERPL-SCNC: 25 MMOL/L — SIGNIFICANT CHANGE UP (ref 22–31)
CREAT SERPL-MCNC: 10.33 MG/DL — HIGH (ref 0.5–1.3)
EGFR: 5 ML/MIN/1.73M2 — LOW
GLUCOSE SERPL-MCNC: 96 MG/DL — SIGNIFICANT CHANGE UP (ref 70–99)
HCT VFR BLD CALC: 24.7 % — LOW (ref 39–50)
HGB BLD-MCNC: 7.6 G/DL — LOW (ref 13–17)
MCHC RBC-ENTMCNC: 27.8 PG — SIGNIFICANT CHANGE UP (ref 27–34)
MCHC RBC-ENTMCNC: 30.8 GM/DL — LOW (ref 32–36)
MCV RBC AUTO: 90.5 FL — SIGNIFICANT CHANGE UP (ref 80–100)
NRBC # BLD: 0 /100 WBCS — SIGNIFICANT CHANGE UP (ref 0–0)
PLATELET # BLD AUTO: 214 K/UL — SIGNIFICANT CHANGE UP (ref 150–400)
POTASSIUM SERPL-MCNC: 4.4 MMOL/L — SIGNIFICANT CHANGE UP (ref 3.5–5.3)
POTASSIUM SERPL-SCNC: 4.4 MMOL/L — SIGNIFICANT CHANGE UP (ref 3.5–5.3)
RBC # BLD: 2.73 M/UL — LOW (ref 4.2–5.8)
RBC # FLD: 16 % — HIGH (ref 10.3–14.5)
RH IG SCN BLD-IMP: POSITIVE — SIGNIFICANT CHANGE UP
SODIUM SERPL-SCNC: 134 MMOL/L — LOW (ref 135–145)
WBC # BLD: 10.91 K/UL — HIGH (ref 3.8–10.5)
WBC # FLD AUTO: 10.91 K/UL — HIGH (ref 3.8–10.5)

## 2023-09-12 PROCEDURE — 99233 SBSQ HOSP IP/OBS HIGH 50: CPT

## 2023-09-12 RX ADMIN — AZITHROMYCIN 255 MILLIGRAM(S): 500 TABLET, FILM COATED ORAL at 00:24

## 2023-09-12 RX ADMIN — Medication 3 MILLIGRAM(S): at 21:21

## 2023-09-12 RX ADMIN — Medication 1 PATCH: at 12:03

## 2023-09-12 RX ADMIN — Medication 1 PATCH: at 07:55

## 2023-09-12 RX ADMIN — Medication 300 MILLIGRAM(S): at 17:33

## 2023-09-12 RX ADMIN — ATORVASTATIN CALCIUM 40 MILLIGRAM(S): 80 TABLET, FILM COATED ORAL at 21:21

## 2023-09-12 RX ADMIN — CEFTRIAXONE 100 MILLIGRAM(S): 500 INJECTION, POWDER, FOR SOLUTION INTRAMUSCULAR; INTRAVENOUS at 22:28

## 2023-09-12 RX ADMIN — CHLORHEXIDINE GLUCONATE 1 APPLICATION(S): 213 SOLUTION TOPICAL at 05:25

## 2023-09-12 RX ADMIN — AZITHROMYCIN 255 MILLIGRAM(S): 500 TABLET, FILM COATED ORAL at 22:59

## 2023-09-12 RX ADMIN — Medication 300 MILLIGRAM(S): at 05:25

## 2023-09-12 RX ADMIN — Medication 1 PATCH: at 17:29

## 2023-09-12 RX ADMIN — SEVELAMER CARBONATE 800 MILLIGRAM(S): 2400 POWDER, FOR SUSPENSION ORAL at 17:33

## 2023-09-12 NOTE — PROGRESS NOTE ADULT - SUBJECTIVE AND OBJECTIVE BOX
remains dyspneic, dry cough- improving.     GENERAL: No fevers, no chills.  EYES: No blurry vision,  No photophobia  ENT: No sore throat.  No dysphagia  Cardiovascular: No chest pain, palpitations, orthopnea  Pulmonary: + cough, no wheezing. + shortness of breath- improving  Gastrointestinal: No abdominal pain, no diarrhea, no constipation.    Musculoskeletal: No weakness.  No myalgias.  Dermatology:  No rashes.  Neuro: No Headache.  No vertigo.  No dizziness.  Psych: No anxiety, no depression.  Denies suicidal thoughts.    MEDICATIONS  (STANDING):  atorvastatin 40 milliGRAM(s) Oral at bedtime  azithromycin  IVPB 500 milliGRAM(s) IV Intermittent every 24 hours  cefTRIAXone   IVPB 1000 milliGRAM(s) IV Intermittent every 24 hours  chlorhexidine 2% Cloths 1 Application(s) Topical <User Schedule>  heparin   Injectable 5000 Unit(s) SubCutaneous every 8 hours  labetalol 300 milliGRAM(s) Oral every 12 hours  nicotine -  14 mG/24Hr(s) Patch 1 Patch Transdermal daily  sevelamer carbonate 800 milliGRAM(s) Oral every 12 hours    MEDICATIONS  (PRN):  acetaminophen     Tablet .. 650 milliGRAM(s) Oral every 6 hours PRN Temp greater or equal to 38C (100.4F), Mild Pain (1 - 3)  aluminum hydroxide/magnesium hydroxide/simethicone Suspension 30 milliLiter(s) Oral every 4 hours PRN Dyspepsia  melatonin 3 milliGRAM(s) Oral at bedtime PRN Insomnia  ondansetron Injectable 4 milliGRAM(s) IV Push every 8 hours PRN Nausea and/or Vomiting    Vital Signs Last 24 Hrs  T(C): 36.7 (12 Sep 2023 08:15), Max: 37.3 (12 Sep 2023 05:17)  T(F): 98 (12 Sep 2023 08:15), Max: 99.1 (12 Sep 2023 05:17)  HR: 77 (12 Sep 2023 10:05) (77 - 84)  BP: 127/62 (12 Sep 2023 08:15) (124/68 - 151/76)  BP(mean): 84 (12 Sep 2023 08:15) (84 - 84)  RR: 18 (12 Sep 2023 08:15) (18 - 20)  SpO2: 97% (12 Sep 2023 10:05) (97% - 98%)    Parameters below as of 12 Sep 2023 08:15  Patient On (Oxygen Delivery Method): nasal cannula  O2 Flow (L/min): 4    GENERAL: comfortable on high flow, dyspnea with exertion  HEAD:  Atraumatic, Normocephalic  EYES: EOMI, PERRLA, conjunctiva and sclera clear  ENT: Pharynx not erythematous  PULMONARY: decrease lung sounds b/l bases, did not appreciate crackles   CARDIOVASCULAR: Regular rate and rhythm; No murmurs, rubs, or gallops  ABDOMEN: Soft, Nontender, Nondistended; Bowel sounds present  EXTREMITIES:  2+ Peripheral Pulses, No clubbing, cyanosis; trace edema up to mid shin  MUSCULOSKELETAL: No calf tenderness  PSYCH: AAOx3, normal affect  SKIN: warm and dry, No rashes or lesions    .  LABS:                         7.6    10.91 )-----------( 214      ( 12 Sep 2023 06:17 )             24.7     09-12    134<L>  |  91<L>  |  50<H>  ----------------------------<  96  4.4   |  25  |  10.33<H>    Ca    9.8      12 Sep 2023 06:14  Phos  3.6     09-11  Mg     2.0     09-11    TPro  8.4<H>  /  Alb  3.7  /  TBili  0.5  /  DBili  x   /  AST  15  /  ALT  <5<L>  /  AlkPhos  122<H>  09-10    PT/INR - ( 10 Sep 2023 12:57 )   PT: 14.2 sec;   INR: 1.36 ratio         PTT - ( 10 Sep 2023 12:57 )  PTT:36.3 sec  Urinalysis Basic - ( 12 Sep 2023 06:14 )    Color: x / Appearance: x / SG: x / pH: x  Gluc: 96 mg/dL / Ketone: x  / Bili: x / Urobili: x   Blood: x / Protein: x / Nitrite: x   Leuk Esterase: x / RBC: x / WBC x   Sq Epi: x / Non Sq Epi: x / Bacteria: x            RADIOLOGY, EKG & ADDITIONAL TESTS: Reviewed.

## 2023-09-12 NOTE — CHART NOTE - NSCHARTNOTEFT_GEN_A_CORE
Left (1) message(s) for patient in regards to follow up care with callback information.
Patient is a 58y old  Male who presents with a chief complaint of Missed HD (05 Sep 2023 15:26)    CC: Leaving AMA       Vital Signs Last 24 Hrs  T(C): 36.8 (05 Sep 2023 08:05), Max: 36.8 (05 Sep 2023 08:05)  T(F): 98.3 (05 Sep 2023 08:05), Max: 98.3 (05 Sep 2023 08:05)  HR: 86 (05 Sep 2023 08:05) (86 - 97)  BP: 149/95 (05 Sep 2023 08:05) (149/95 - 169/83)  BP(mean): --  RR: 20 (05 Sep 2023 08:05) (19 - 20)  SpO2: 94% (05 Sep 2023 08:05) (94% - 98%)    Parameters below as of 05 Sep 2023 08:05  Patient On (Oxygen Delivery Method): room air          Labs:                          8.2    8.57  )-----------( 119      ( 05 Sep 2023 06:22 )             26.2     09-05    140  |  97  |  76<H>  ----------------------------<  129<H>  4.1   |  23  |  14.65<H>    Ca    8.9      05 Sep 2023 06:22  Phos  3.7     09-05  Mg     2.3     09-05    TPro  7.3  /  Alb  3.3  /  TBili  0.3  /  DBili  x   /  AST  10  /  ALT  <5<L>  /  AlkPhos  123<H>  09-05              Plan of care   Pt seen in ED with SOB secondary to missed HD session. Pt was scheduled for HD session on 9/5 at 5 pm. Explained importance of receiving HD as scheduled and not missing sessions, and explained importance of remaining inpatient. Pt refused, stating does not want to wait 2 hrs for HD. Pt left AMA prior to AMA paperwork being completed or signed and left AMA prior to HD session. Explanation of the Risk and Benefits of Leaving AMA was explained to patient.  Patient A+ O x 3 and verbalized full understanding of risk vs benefits including sudden death. MD aware Follow up with your primary MD, nephrology and HD center immediately upon discharge.     Date/Time patient left AMA: 9/5/23

## 2023-09-12 NOTE — PHYSICAL THERAPY INITIAL EVALUATION ADULT - ADDITIONAL COMMENTS
Patient reports he lives alone in an apartment with 5 steps to enter and was fully independent prior.

## 2023-09-12 NOTE — PHYSICAL THERAPY INITIAL EVALUATION ADULT - PERTINENT HX OF CURRENT PROBLEM, REHAB EVAL
58 year old M with PMH ESRD on HD MWF, HTN, HLD presenting for shortness of breath. Pt reports feeling less shortness of breath than when he presented and reports having a cough that has resolved. In ED pt placed on BIPAP for respiratory support. He states that he is not staying. Per notes pt had HD done last friday on schedule. He denies fever, chills, body aches.  CXR showing b/l airspace opacities in lower lung fields. Pt is able to complete full sentences but appears to be short of breath.

## 2023-09-12 NOTE — PROGRESS NOTE ADULT - SUBJECTIVE AND OBJECTIVE BOX
[facility] - ОЛЬГА Herron / ОЛЬГА Avilez / INOCENCIA Perales/ ОЛЬГА Corral/ ОЛЬГА Langley/ SUKHWINDER West / NAHUM Francois / ISMAEL Rose  ---------------------------------------------------------------------------------------------------------------  seen and examined today for ESRD  Interval : resp status improved  VITALS:  T(F): 97.9 (09-12-23 @ 11:28), Max: 99.1 (09-12-23 @ 05:17)  HR: 77 (09-12-23 @ 11:28)  BP: 139/74 (09-12-23 @ 11:28)  RR: 18 (09-12-23 @ 12:59)  SpO2: 98% (09-12-23 @ 12:59)  Wt(kg): --    09-11 @ 07:01  -  09-12 @ 07:00  --------------------------------------------------------  IN: 1520 mL / OUT: 3000 mL / NET: -1480 mL    09-12 @ 07:01  -  09-12 @ 13:51  --------------------------------------------------------  IN: 1160 mL / OUT: 0 mL / NET: 1160 mL      Physical Exam :-  Constitutional: NAD  Neck: Supple.  Respiratory: crackles at bases +  Cardiovascular: S1, S2 normal,  Gastrointestinal: Bowel Sounds present, soft, non tender.  Extremities: No edema  Neurological: Alert and Oriented x 3, no focal deficits  Psychiatric: Normal mood, normal affect  Data:-  Allergies :   No Known Allergies    Hospital Medications:   MEDICATIONS  (STANDING):  atorvastatin 40 milliGRAM(s) Oral at bedtime  azithromycin  IVPB 500 milliGRAM(s) IV Intermittent every 24 hours  cefTRIAXone   IVPB 1000 milliGRAM(s) IV Intermittent every 24 hours  chlorhexidine 2% Cloths 1 Application(s) Topical <User Schedule>  heparin   Injectable 5000 Unit(s) SubCutaneous every 8 hours  labetalol 300 milliGRAM(s) Oral every 12 hours  nicotine -  14 mG/24Hr(s) Patch 1 Patch Transdermal daily  sevelamer carbonate 800 milliGRAM(s) Oral every 12 hours    09-12    134<L>  |  91<L>  |  50<H>  ----------------------------<  96  4.4   |  25  |  10.33<H>    Ca    9.8      12 Sep 2023 06:14  Phos  3.6     09-11  Mg     2.0     09-11      Creatinine Trend: 10.33 <--, 8.50 <--, 13.58 <--                        7.6    10.91 )-----------( 214      ( 12 Sep 2023 06:17 )             24.7

## 2023-09-12 NOTE — PROVIDER CONTACT NOTE (OTHER) - RECOMMENDATIONS
educated pt on importance of wearing bipap and potential consequences of refusing with the respiratory therapist

## 2023-09-12 NOTE — PROGRESS NOTE ADULT - SUBJECTIVE AND OBJECTIVE BOX
DATE OF SERVICE: 09-12-23 @ 13:03    Patient is a 58y old  Male who presents with a chief complaint of shortness of breath (12 Sep 2023 11:25)      INTERVAL HISTORY: No acute events, reports cough improving     REVIEW OF SYSTEMS:  CONSTITUTIONAL: No weakness  EYES/ENT: No visual changes;  No throat pain   NECK: No pain or stiffness  RESPIRATORY: No cough, wheezing; No shortness of breath  CARDIOVASCULAR: No chest pain or palpitations  GASTROINTESTINAL: No abdominal  pain. No nausea, vomiting, or hematemesis  GENITOURINARY: No dysuria, frequency or hematuria  NEUROLOGICAL: No stroke like symptoms  SKIN: No rashes    TELEMETRY Personally reviewed: SR 70-80  	  MEDICATIONS:  labetalol 300 milliGRAM(s) Oral every 12 hours        PHYSICAL EXAM:  T(C): 36.6 (09-12-23 @ 11:28), Max: 37.3 (09-12-23 @ 05:17)  HR: 77 (09-12-23 @ 11:28) (77 - 84)  BP: 139/74 (09-12-23 @ 11:28) (124/68 - 151/76)  RR: 18 (09-12-23 @ 12:59) (18 - 20)  SpO2: 98% (09-12-23 @ 12:59) (97% - 98%)  Wt(kg): --  I&O's Summary    11 Sep 2023 07:01  -  12 Sep 2023 07:00  --------------------------------------------------------  IN: 1520 mL / OUT: 3000 mL / NET: -1480 mL    12 Sep 2023 07:01  -  12 Sep 2023 13:03  --------------------------------------------------------  IN: 1160 mL / OUT: 0 mL / NET: 1160 mL          Appearance: In no distress	  HEENT:    PERRL, EOMI	  Cardiovascular:  S1 S2, No JVD  Respiratory: Lungs clear to auscultation	  Gastrointestinal:  Soft, Non-tender, + BS	  Vascularature:  No edema of LE  Psychiatric: Appropriate affect   Neuro: no acute focal deficits                               7.6    10.91 )-----------( 214      ( 12 Sep 2023 06:17 )             24.7     09-12    134<L>  |  91<L>  |  50<H>  ----------------------------<  96  4.4   |  25  |  10.33<H>    Ca    9.8      12 Sep 2023 06:14  Phos  3.6     09-11  Mg     2.0     09-11          Labs personally reviewed      ASSESSMENT/PLAN: 	  58 year old M with PMH ESRD on HD MWF, HTN, HLD presenting for shortness of breath and fluid overload.      Problem/Plan - 1:  ·  Problem: Acute respiratory failure with hypoxia.   ·  Plan: Due to bacterial pneumonia which precipitated volume overload state,    - s/p emergent HD on 9/10, HD M/W/F (of note: patient did not miss any HD sessions)  - Continued Volume removal with HD  - TTE with moderate LVH and preserved EF    Problem/Plan - 2:  ·  Problem: HTN (hypertension).   ·  Plan: - bp elevated  - c/w labetalol  -start home lisinopril and hydralazine as BP tolerates     Problem/Plan - 3:  ·  Problem: HLD (hyperlipidemia).   ·  Plan: - c/w statin.    Problem/Plan - 4:  ·  Problem: Prophylactic measure.   ·  Plan: - DVT ppx: Heparin.            LATRELL Dillard DO [facility]  Cardiovascular Medicine  [address] [place] [address], [address]  Available via call or text on [employer] TE[employer]  Office: [phone]   DATE OF SERVICE: 09-12-23 @ 13:03    Patient is a 58y old  Male who presents with a chief complaint of shortness of breath (12 Sep 2023 11:25)      INTERVAL HISTORY: No acute events, reports cough improving     REVIEW OF SYSTEMS:  CONSTITUTIONAL: No weakness  EYES/ENT: No visual changes;  No throat pain   NECK: No pain or stiffness  RESPIRATORY: No cough, wheezing; No shortness of breath  CARDIOVASCULAR: No chest pain or palpitations  GASTROINTESTINAL: No abdominal  pain. No nausea, vomiting, or hematemesis  GENITOURINARY: No dysuria, frequency or hematuria  NEUROLOGICAL: No stroke like symptoms  SKIN: No rashes    TELEMETRY Personally reviewed: SR 70-80  	  MEDICATIONS:  labetalol 300 milliGRAM(s) Oral every 12 hours        PHYSICAL EXAM:  T(C): 36.6 (09-12-23 @ 11:28), Max: 37.3 (09-12-23 @ 05:17)  HR: 77 (09-12-23 @ 11:28) (77 - 84)  BP: 139/74 (09-12-23 @ 11:28) (124/68 - 151/76)  RR: 18 (09-12-23 @ 12:59) (18 - 20)  SpO2: 98% (09-12-23 @ 12:59) (97% - 98%)  Wt(kg): --  I&O's Summary    11 Sep 2023 07:01  -  12 Sep 2023 07:00  --------------------------------------------------------  IN: 1520 mL / OUT: 3000 mL / NET: -1480 mL    12 Sep 2023 07:01  -  12 Sep 2023 13:03  --------------------------------------------------------  IN: 1160 mL / OUT: 0 mL / NET: 1160 mL          Appearance: In no distress	  HEENT:    PERRL, EOMI	  Cardiovascular:  S1 S2, No JVD  Respiratory: Lungs clear to auscultation	  Gastrointestinal:  Soft, Non-tender, + BS	  Vascularature:  No edema of LE  Psychiatric: Appropriate affect   Neuro: no acute focal deficits                               7.6    10.91 )-----------( 214      ( 12 Sep 2023 06:17 )             24.7     09-12    134<L>  |  91<L>  |  50<H>  ----------------------------<  96  4.4   |  25  |  10.33<H>    Ca    9.8      12 Sep 2023 06:14  Phos  3.6     09-11  Mg     2.0     09-11          Labs personally reviewed      ASSESSMENT/PLAN: 	  58 year old M with PMH ESRD on HD MWF, HTN, HLD presenting for shortness of breath and fluid overload.      Problem/Plan - 1:  ·  Problem: Acute respiratory failure with hypoxia.   ·  Plan: Due to bacterial pneumonia which precipitated volume overload state,    - s/p emergent HD on 9/10, HD M/W/F    - Continued Volume removal with HD  - TTE with moderate LVH and preserved EF    Problem/Plan - 2:  ·  Problem: HTN (hypertension).   ·  Plan: - bp elevated  - c/w labetalol  -start home lisinopril and hydralazine as BP tolerates     Problem/Plan - 3:  ·  Problem: HLD (hyperlipidemia).   ·  Plan: - c/w statin.    Problem/Plan - 4:  ·  Problem: Prophylactic measure.   ·  Plan: - DVT ppx: Heparin.            LATRELL Dillard DO [facility]  Cardiovascular Medicine  [address] [place] [address], [address]  Available via call or text on [employer] TEAM[employer]  Office: [phone]

## 2023-09-13 ENCOUNTER — TRANSCRIPTION ENCOUNTER (OUTPATIENT)
Age: 58
End: 2023-09-13

## 2023-09-13 VITALS
OXYGEN SATURATION: 95 % | SYSTOLIC BLOOD PRESSURE: 114 MMHG | DIASTOLIC BLOOD PRESSURE: 70 MMHG | RESPIRATION RATE: 17 BRPM | TEMPERATURE: 98 F | HEART RATE: 95 BPM

## 2023-09-13 LAB
ANION GAP SERPL CALC-SCNC: 20 MMOL/L — HIGH (ref 5–17)
BUN SERPL-MCNC: 62 MG/DL — HIGH (ref 7–23)
CALCIUM SERPL-MCNC: 10.5 MG/DL — SIGNIFICANT CHANGE UP (ref 8.4–10.5)
CHLORIDE SERPL-SCNC: 89 MMOL/L — LOW (ref 96–108)
CO2 SERPL-SCNC: 23 MMOL/L — SIGNIFICANT CHANGE UP (ref 22–31)
CREAT SERPL-MCNC: 11.72 MG/DL — HIGH (ref 0.5–1.3)
EGFR: 5 ML/MIN/1.73M2 — LOW
GLUCOSE SERPL-MCNC: 90 MG/DL — SIGNIFICANT CHANGE UP (ref 70–99)
HCT VFR BLD CALC: 25.4 % — LOW (ref 39–50)
HGB BLD-MCNC: 8 G/DL — LOW (ref 13–17)
MCHC RBC-ENTMCNC: 28.4 PG — SIGNIFICANT CHANGE UP (ref 27–34)
MCHC RBC-ENTMCNC: 31.5 GM/DL — LOW (ref 32–36)
MCV RBC AUTO: 90.1 FL — SIGNIFICANT CHANGE UP (ref 80–100)
NRBC # BLD: 0 /100 WBCS — SIGNIFICANT CHANGE UP (ref 0–0)
PLATELET # BLD AUTO: 248 K/UL — SIGNIFICANT CHANGE UP (ref 150–400)
POTASSIUM SERPL-MCNC: 4.5 MMOL/L — SIGNIFICANT CHANGE UP (ref 3.5–5.3)
POTASSIUM SERPL-SCNC: 4.5 MMOL/L — SIGNIFICANT CHANGE UP (ref 3.5–5.3)
RBC # BLD: 2.82 M/UL — LOW (ref 4.2–5.8)
RBC # FLD: 15.9 % — HIGH (ref 10.3–14.5)
SODIUM SERPL-SCNC: 132 MMOL/L — LOW (ref 135–145)
WBC # BLD: 9.64 K/UL — SIGNIFICANT CHANGE UP (ref 3.8–10.5)
WBC # FLD AUTO: 9.64 K/UL — SIGNIFICANT CHANGE UP (ref 3.8–10.5)

## 2023-09-13 PROCEDURE — 99239 HOSP IP/OBS DSCHRG MGMT >30: CPT

## 2023-09-13 RX ORDER — AZITHROMYCIN 500 MG/1
1 TABLET, FILM COATED ORAL
Qty: 2 | Refills: 0
Start: 2023-09-13 | End: 2023-09-14

## 2023-09-13 RX ORDER — NICOTINE POLACRILEX 2 MG
1 GUM BUCCAL
Qty: 1 | Refills: 0
Start: 2023-09-13 | End: 2023-10-11

## 2023-09-13 RX ORDER — LISINOPRIL 2.5 MG/1
40 TABLET ORAL DAILY
Refills: 0 | Status: DISCONTINUED | OUTPATIENT
Start: 2023-09-13 | End: 2023-09-13

## 2023-09-13 RX ORDER — ATORVASTATIN CALCIUM 80 MG/1
1 TABLET, FILM COATED ORAL
Qty: 30 | Refills: 0
Start: 2023-09-13 | End: 2023-10-12

## 2023-09-13 RX ORDER — SEVELAMER CARBONATE 2400 MG/1
1 POWDER, FOR SUSPENSION ORAL
Qty: 60 | Refills: 0
Start: 2023-09-13 | End: 2023-10-12

## 2023-09-13 RX ORDER — CEFUROXIME AXETIL 250 MG
1 TABLET ORAL
Qty: 2 | Refills: 0
Start: 2023-09-13 | End: 2023-09-14

## 2023-09-13 RX ORDER — CINACALCET 30 MG/1
30 TABLET, FILM COATED ORAL DAILY
Refills: 0 | Status: DISCONTINUED | OUTPATIENT
Start: 2023-09-13 | End: 2023-09-13

## 2023-09-13 RX ORDER — GABAPENTIN 400 MG/1
300 CAPSULE ORAL AT BEDTIME
Refills: 0 | Status: DISCONTINUED | OUTPATIENT
Start: 2023-09-13 | End: 2023-09-13

## 2023-09-13 RX ADMIN — Medication 1 PATCH: at 07:42

## 2023-09-13 RX ADMIN — Medication 1 PATCH: at 05:25

## 2023-09-13 RX ADMIN — CHLORHEXIDINE GLUCONATE 1 APPLICATION(S): 213 SOLUTION TOPICAL at 05:25

## 2023-09-13 NOTE — PROGRESS NOTE ADULT - PROBLEM SELECTOR PLAN 2
- s/p emergency HD session on 9/10  - resume HD M/W/F  - renal following
- c/w HD M/W/F  - renal following
- s/p emergency HD session on 9/10  - c/w HD M/W/F  - renal following

## 2023-09-13 NOTE — DISCHARGE NOTE NURSING/CASE MANAGEMENT/SOCIAL WORK - NSDCFUADDAPPT_GEN_ALL_CORE_FT
APPTS ARE READY TO BE MADE: [x] YES    Best Family or Patient Contact (if needed):    Additional Information about above appointments (if needed):    1: PCP/Cardiology  2:   3:     Other comments or requests:

## 2023-09-13 NOTE — DISCHARGE NOTE PROVIDER - NSDCFUADDAPPT_GEN_ALL_CORE_FT
APPTS ARE READY TO BE MADE: [x] YES    Best Family or Patient Contact (if needed):    Additional Information about above appointments (if needed):    1:   2:   3:     Other comments or requests:    APPTS ARE READY TO BE MADE: [x] YES    Best Family or Patient Contact (if needed):    Additional Information about above appointments (if needed):    1: PCP/Cardiology  2:   3:     Other comments or requests:    APPTS ARE READY TO BE MADE: [x] YES    Best Family or Patient Contact (if needed):    Additional Information about above appointments (if needed):    1: PCP/Cardiology  2:   3:     Other comments or requests:   Patient was previously scheduled for an appointment on 10/17/2023 12:00PM at [address] [facility], [address], [place] [address] with Dr. Himanshu Aguiar.

## 2023-09-13 NOTE — DISCHARGE NOTE PROVIDER - INSTRUCTIONS
1.2L fluid restriction as you are a dialysis patient and it is important to keep your fluid levels at an appropriate level to not overload you where you are short of breath and not too little where you will be dehydrated

## 2023-09-13 NOTE — PROGRESS NOTE ADULT - PROBLEM SELECTOR PLAN 1
- due to bacterial pneumonia and missed HD session, resulting in acute diastolic CHF exacerbation  - s/p emergent HD on 9/10, HD M/W/F   - saturating 97% on 4L- wean as tolerated   - CT chest- pneumonia and volume overloaded  - c/w ceftriaxone and azithromax day 3 (start 9/10)  - blood cultures ngtd  - RVP 9/11 negative   - TTE 9/11 with LVH and grade 2 diastolic dysfunction  - renal following  - cardiology following
- due to bacterial pneumonia and missed HD session, resulting in acute diastolic CHF exacerbation  - saturating well on room air   - CT chest- pneumonia and volume overloaded  - s/p ceftriaxone and azithromax x 3 days, to complete 2 more days of antibiotics with ceftin and azithromax  - blood cultures ngtd  - RVP 9/11 negative   - TTE 9/11 with LVH and grade 2 diastolic dysfunction  - renal following  - cardiology following
- due to bacterial pneumonia which precipitated volume overload state, on HD; concern for acute CHF exacerbation; R/O COVID/ viral illness  - s/p emergent HD on 9/10, HD M/W/F (of note: patient did not miss any HD sessions)  - on high flow-- obtain ABG and will transition to cpap given pulm edema noted on CXR   - CT chest ordered STAT to further evaluate pna and/or volume overload  - c/w ceftriaxone and azithromax day 2 (start 9/10)  - blood cultures pending  - RVP ordered STAT on 9/11  - pro bnp >90,000-- TTE pending, cardiology dr browning consulted 9/11  - renal following  - cardiology consult pending  - will hold off on pulm consult for now pending further workup

## 2023-09-13 NOTE — DISCHARGE NOTE PROVIDER - NSDCMRMEDTOKEN_GEN_ALL_CORE_FT
amLODIPine 10 mg oral tablet: 1 tab(s) orally once a day  ergocalciferol 1.25 mg (50,000 intl units) oral capsule: 1 cap(s) orally once a week  gabapentin 300 mg oral tablet: orally once a day (at bedtime)  hydrALAZINE 100 mg oral tablet: 1 tab(s) orally 3 times a day  labetalol 300 mg oral tablet: 1 tab(s) orally 3 times a day  lisinopril 40 mg oral tablet: 1 tab(s) orally once a day  Sensipar 30 mg oral tablet: 1 tab(s) orally once a day   atorvastatin 40 mg oral tablet: 1 tab(s) orally once a day (at bedtime)  ergocalciferol 1.25 mg (50,000 intl units) oral capsule: 1 cap(s) orally once a week  gabapentin 300 mg oral tablet: orally once a day (at bedtime)  labetalol 300 mg oral tablet: 1 tab(s) orally 3 times a day  lisinopril 40 mg oral tablet: 1 tab(s) orally once a day  nicotine 14 mg/24 hr transdermal film, extended release: 1 patch transdermal once a day  Sensipar 30 mg oral tablet: 1 tab(s) orally once a day  sevelamer carbonate 800 mg oral tablet: 1 tab(s) orally every 12 hours   atorvastatin 40 mg oral tablet: 1 tab(s) orally once a day (at bedtime)  azithromycin 500 mg oral tablet: 1 tab(s) orally once a day  cefuroxime 250 mg oral tablet: 1 tab(s) orally once a day  ergocalciferol 1.25 mg (50,000 intl units) oral capsule: 1 cap(s) orally once a week  gabapentin 300 mg oral tablet: orally once a day (at bedtime)  labetalol 300 mg oral tablet: 1 tab(s) orally 3 times a day  lisinopril 40 mg oral tablet: 1 tab(s) orally once a day  nicotine 14 mg/24 hr transdermal film, extended release: 1 patch transdermal once a day  Sensipar 30 mg oral tablet: 1 tab(s) orally once a day  sevelamer carbonate 800 mg oral tablet: 1 tab(s) orally every 12 hours

## 2023-09-13 NOTE — PROGRESS NOTE ADULT - SUBJECTIVE AND OBJECTIVE BOX
DATE OF SERVICE: 09-13-23 @ 12:28    Patient is a 58y old  Male who presents with a chief complaint of shortness of breath (13 Sep 2023 12:03)      INTERVAL HISTORY: Feels ok.     REVIEW OF SYSTEMS:  CONSTITUTIONAL: No weakness  EYES/ENT: No visual changes;  No throat pain   NECK: No pain or stiffness  RESPIRATORY: No cough, wheezing; No shortness of breath  CARDIOVASCULAR: No chest pain or palpitations  GASTROINTESTINAL: No abdominal  pain. No nausea, vomiting, or hematemesis  GENITOURINARY: No dysuria, frequency or hematuria  NEUROLOGICAL: No stroke like symptoms  SKIN: No rashes    TELEMETRY Personally reviewed: SR 70-90  	  MEDICATIONS:  labetalol 300 milliGRAM(s) Oral every 12 hours  lisinopril 40 milliGRAM(s) Oral daily        PHYSICAL EXAM:  T(C): 36 (09-13-23 @ 12:10), Max: 37.1 (09-13-23 @ 04:02)  HR: 74 (09-13-23 @ 12:10) (74 - 86)  BP: 114/77 (09-13-23 @ 12:10) (114/77 - 148/78)  RR: 18 (09-13-23 @ 12:10) (18 - 18)  SpO2: 98% (09-13-23 @ 12:10) (95% - 100%)  Wt(kg): --  I&O's Summary    12 Sep 2023 07:01  -  13 Sep 2023 07:00  --------------------------------------------------------  IN: 1160 mL / OUT: 2300 mL / NET: -1140 mL          Appearance: In no distress	  HEENT:    PERRL, EOMI	  Cardiovascular:  S1 S2, No JVD  Respiratory: Lungs clear to auscultation	  Gastrointestinal:  Soft, Non-tender, + BS	  Vascularature:  No edema of LE  Psychiatric: Appropriate affect   Neuro: no acute focal deficits                               8.0    9.64  )-----------( 248      ( 13 Sep 2023 06:36 )             25.4     09-13    132<L>  |  89<L>  |  62<H>  ----------------------------<  90  4.5   |  23  |  11.72<H>    Ca    10.5      13 Sep 2023 06:36          Labs personally reviewed      ASSESSMENT/PLAN: 	    58 year old M with PMH ESRD on HD MWF, HTN, HLD presenting for shortness of breath and fluid overload.      Problem/Plan - 1:  ·  Problem: Acute respiratory failure with hypoxia.   ·  Plan: Due to bacterial pneumonia which precipitated volume overload state,    - s/p emergent HD on 9/10, HD M/W/F    - Continued Volume removal with HD  - TTE with moderate LVH and preserved EF    Problem/Plan - 2:  ·  Problem: HTN (hypertension).   ·  Plan: - bp elevated  - c/w labetalol  - lisinopril started d/t persistently elevated BP  - Start hydralazine as BP tolerates     Problem/Plan - 3:  ·  Problem: HLD (hyperlipidemia).   ·  Plan: - c/w statin.    Problem/Plan - 4:  ·  Problem: Prophylactic measure.   ·  Plan: - DVT ppx: Heparin.      Jyoti Bowden, DEEPALI-NP   Samuel Mcmanus DO [facility]  Cardiovascular Medicine  [address] [place] [address], [address]  Available through call or text on [employer]  Office: [phone]

## 2023-09-13 NOTE — PROGRESS NOTE ADULT - SUBJECTIVE AND OBJECTIVE BOX
Patient seen and examined  no complaints  wants to go home    No Known Allergies    Hospital Medications:   MEDICATIONS  (STANDING):  atorvastatin 40 milliGRAM(s) Oral at bedtime  azithromycin  IVPB 500 milliGRAM(s) IV Intermittent every 24 hours  cefTRIAXone   IVPB 1000 milliGRAM(s) IV Intermittent every 24 hours  chlorhexidine 2% Cloths 1 Application(s) Topical <User Schedule>  cinacalcet 30 milliGRAM(s) Oral daily  gabapentin 300 milliGRAM(s) Oral at bedtime  heparin   Injectable 5000 Unit(s) SubCutaneous every 8 hours  labetalol 300 milliGRAM(s) Oral every 12 hours  lisinopril 40 milliGRAM(s) Oral daily  nicotine -  14 mG/24Hr(s) Patch 1 Patch Transdermal daily  sevelamer carbonate 800 milliGRAM(s) Oral every 12 hours        VITALS:  T(F): 98.2 (09-13-23 @ 13:08), Max: 98.8 (09-13-23 @ 04:02)  HR: 95 (09-13-23 @ 13:08)  BP: 114/70 (09-13-23 @ 13:08)  RR: 17 (09-13-23 @ 13:08)  SpO2: 95% (09-13-23 @ 13:08)  Wt(kg): --    09-12 @ 07:01  -  09-13 @ 07:00  --------------------------------------------------------  IN: 1160 mL / OUT: 2300 mL / NET: -1140 mL    09-13 @ 07:01  -  09-13 @ 14:17  --------------------------------------------------------  IN: 0 mL / OUT: 2800 mL / NET: -2800 mL        Physical Exam :-  Constitutional: NAD  Neck: Supple.  Respiratory: crackles at bases +  Cardiovascular: S1, S2 normal,  Gastrointestinal: Bowel Sounds present, soft, non tender.  Extremities: No edema  Neurological: Alert and Oriented x 3, no focal deficits  Psychiatric: Normal mood, normal affect    LABS:  09-13    132<L>  |  89<L>  |  62<H>  ----------------------------<  90  4.5   |  23  |  11.72<H>    Ca    10.5      13 Sep 2023 06:36      Creatinine Trend: 11.72 <--, 10.33 <--, 8.50 <--, 13.58 <--                        8.0    9.64  )-----------( 248      ( 13 Sep 2023 06:36 )             25.4     Urine Studies:  Urinalysis Basic - ( 13 Sep 2023 06:36 )    Color:  / Appearance:  / SG:  / pH:   Gluc: 90 mg/dL / Ketone:   / Bili:  / Urobili:    Blood:  / Protein:  / Nitrite:    Leuk Esterase:  / RBC:  / WBC    Sq Epi:  / Non Sq Epi:  / Bacteria:         RADIOLOGY & ADDITIONAL STUDIES:

## 2023-09-13 NOTE — DISCHARGE NOTE PROVIDER - CARE PROVIDER_API CALL
Himanshu Aguiar  Cardiology  [address], [address]  [place] [address]  Phone: [phone]  Fax: [phone]  Follow Up Time: 1 week

## 2023-09-13 NOTE — PROGRESS NOTE ADULT - PROBLEM SELECTOR PLAN 3
- bp elevated  - resume labetolol for now  - holding home hydralazine and lisinopril for now-- add back as needed
- bp stable  - c/w labetolol for now  - resume lisinopril at discharge  - hold hydralazine for now until outpatient pcp f/u
- bp stable  - c/w labetolol for now  - holding home hydralazine and lisinopril for now-- add back as needed

## 2023-09-13 NOTE — DISCHARGE NOTE NURSING/CASE MANAGEMENT/SOCIAL WORK - PATIENT PORTAL LINK FT
You can access the FollowMyHealth Patient Portal offered by [facility] by registering at the following website: [email or web address]. By joining [employer]’s FollowMyHealth portal, you will also be able to view your health information using other applications (apps) compatible with our system.

## 2023-09-13 NOTE — DISCHARGE NOTE PROVIDER - HOSPITAL COURSE
HPI:  58 year old M with PMH ESRD on HD MWF, HTN, HLD presenting for shortness of breath. Pt reports feeling less shortness of breath than when he presented and reports having a cough that has resolved. In ED pt placed on BIPAP for respiratory support. He states that he is not staying. Per notes pt had HD done last friday on schedule. He denies fever, chills, body aches.  CXR showing b/l airspace opacities in lower lung fields. Pt is able to complete full sentences but appears to be short of breath. HPI ROS limited.  (10 Sep 2023 18:35)    Hospital Course: Patient was admitted for further medical management of acute respiratory failure with hypoxia due to bacterial pneumonia and missed HD session, resulting in acute diastolic CHF exacerbation. Patient saturating well on room air. CT chest revealed pneumonia and volume overload. Patient to completed a 5 day course of ceftriaxone and azithromax - to transition to ceftin and azithromax on discharge. Blood cultures ngtd. RVP 9/11 negative. TTE 9/11 with LVH and grade 2 diastolic dysfunction. Nephrology followed patient while admitted to continue HD sessions  Discharge/Dispo/Med rec discussed with attending  ____. Patient medically cleared for discharge with outpatient follow up       Important Medication Changes and Reason:    Active or Pending Issues Requiring Follow-up:  Follow-up with your primary care physician within 1 week.      Advanced Directives:   [ ] Full code  [ ] DNR  [ ] Hospice    Discharge Diagnoses:  Acute respiratory failure with hypoxia.   ESRD on dialysis  HTN   HLD HPI:  58 year old M with PMH ESRD on HD MWF, HTN, HLD presenting for shortness of breath. Pt reports feeling less shortness of breath than when he presented and reports having a cough that has resolved. In ED pt placed on BIPAP for respiratory support. He states that he is not staying. Per notes pt had HD done last friday on schedule. He denies fever, chills, body aches.  CXR showing b/l airspace opacities in lower lung fields. Pt is able to complete full sentences but appears to be short of breath. HPI ROS limited.  (10 Sep 2023 18:35)    Hospital Course: Patient was admitted for further medical management of acute respiratory failure with hypoxia due to bacterial pneumonia and missed HD session, resulting in acute diastolic CHF exacerbation. Patient saturating well on room air. CT chest revealed pneumonia and volume overload. Patient to completed a 5 day course of ceftriaxone and azithromax - to transition to ceftin and azithromax on discharge. Blood cultures ngtd. RVP 9/11 negative. TTE 9/11 with LVH and grade 2 diastolic dysfunction. Nephrology followed patient while admitted to continue HD sessions  Discharge/Dispo/Med rec discussed with attending Dr. Gaitan. Patient medically cleared for discharge with outpatient follow up     Important Medication Changes and Reason:  Amlodipine and hydralazine as BP normalized and improved     Active or Pending Issues Requiring Follow-up:  Follow-up with your primary care physician within 1 week.      Advanced Directives:   [X] Full code  [ ] DNR  [ ] Hospice    Discharge Diagnoses:  Acute respiratory failure with hypoxia.   ESRD on dialysis  HTN   HLD HPI:  58 year old M with PMH ESRD on HD MWF, HTN, HLD presenting for shortness of breath. Pt reports feeling less shortness of breath than when he presented and reports having a cough that has resolved. In ED pt placed on BIPAP for respiratory support. He states that he is not staying. Per notes pt had HD done last friday on schedule. He denies fever, chills, body aches.  CXR showing b/l airspace opacities in lower lung fields. Pt is able to complete full sentences but appears to be short of breath. HPI ROS limited.  (10 Sep 2023 18:35)    Hospital Course: Patient was admitted for further medical management of acute respiratory failure with hypoxia due to bacterial pneumonia and missed HD session, resulting in acute diastolic CHF exacerbation. Patient saturating well on room air. CT chest revealed pneumonia and volume overload. Patient to completed a 5 day course of ceftriaxone and azithromax - to transition to ceftin and azithromax on discharge. Blood cultures ngtd. RVP 9/11 negative. TTE 9/11 with LVH and grade 2 diastolic dysfunction. Nephrology followed patient while admitted to continue HD sessions  Discharge/Dispo/Med rec discussed with attending Dr. Gaitan. Patient medically cleared for discharge with outpatient follow up     Important Medication Changes and Reason:  Amlodipine and hydralazine held on discharge as BP normalized and improved     Active or Pending Issues Requiring Follow-up:  Follow-up with your primary care physician within 1 week.      Advanced Directives:   [X] Full code  [ ] DNR  [ ] Hospice    Discharge Diagnoses:  Acute respiratory failure with hypoxia.   ESRD on dialysis  HTN   HLD

## 2023-09-13 NOTE — PROGRESS NOTE ADULT - SUBJECTIVE AND OBJECTIVE BOX
no complaints, breathing comfortably.     GENERAL: No fevers, no chills.  EYES: No blurry vision,  No photophobia  ENT: No sore throat.  No dysphagia  Cardiovascular: No chest pain, palpitations, orthopnea  Pulmonary: + cough, no wheezing. no shortness of breath  Gastrointestinal: No abdominal pain, no diarrhea, no constipation.    Musculoskeletal: No weakness.  No myalgias.  Dermatology:  No rashes.  Neuro: No Headache.  No vertigo.  No dizziness.  Psych: No anxiety, no depression.  Denies suicidal thoughts.    MEDICATIONS  (STANDING):  atorvastatin 40 milliGRAM(s) Oral at bedtime  azithromycin  IVPB 500 milliGRAM(s) IV Intermittent every 24 hours  cefTRIAXone   IVPB 1000 milliGRAM(s) IV Intermittent every 24 hours  chlorhexidine 2% Cloths 1 Application(s) Topical <User Schedule>  cinacalcet 30 milliGRAM(s) Oral daily  gabapentin 300 milliGRAM(s) Oral at bedtime  heparin   Injectable 5000 Unit(s) SubCutaneous every 8 hours  labetalol 300 milliGRAM(s) Oral every 12 hours  lisinopril 40 milliGRAM(s) Oral daily  nicotine -  14 mG/24Hr(s) Patch 1 Patch Transdermal daily  sevelamer carbonate 800 milliGRAM(s) Oral every 12 hours    MEDICATIONS  (PRN):  acetaminophen     Tablet .. 650 milliGRAM(s) Oral every 6 hours PRN Temp greater or equal to 38C (100.4F), Mild Pain (1 - 3)  aluminum hydroxide/magnesium hydroxide/simethicone Suspension 30 milliLiter(s) Oral every 4 hours PRN Dyspepsia  melatonin 3 milliGRAM(s) Oral at bedtime PRN Insomnia  ondansetron Injectable 4 milliGRAM(s) IV Push every 8 hours PRN Nausea and/or Vomiting    Vital Signs Last 24 Hrs  T(C): 36.2 (13 Sep 2023 09:05), Max: 37.1 (13 Sep 2023 04:02)  T(F): 97.2 (13 Sep 2023 09:05), Max: 98.8 (13 Sep 2023 04:02)  HR: 80 (13 Sep 2023 09:43) (77 - 86)  BP: 123/71 (13 Sep 2023 09:05) (120/74 - 148/78)  BP(mean): --  RR: 18 (13 Sep 2023 09:05) (18 - 18)  SpO2: 95% (13 Sep 2023 09:43) (95% - 100%)    Parameters below as of 13 Sep 2023 09:05  Patient On (Oxygen Delivery Method): room air    GENERAL: NAD  HEAD:  Atraumatic, Normocephalic  EYES: EOMI, PERRLA, conjunctiva and sclera clear  ENT: Pharynx not erythematous  PULMONARY: decrease lung sounds b/l bases, did not appreciate crackles   CARDIOVASCULAR: Regular rate and rhythm; No murmurs, rubs, or gallops  ABDOMEN: Soft, Nontender, Nondistended; Bowel sounds present  EXTREMITIES:  2+ Peripheral Pulses, No clubbing, cyanosis  MUSCULOSKELETAL: No calf tenderness  PSYCH: AAOx3, normal affect  SKIN: warm and dry, No rashes or lesions    .  LABS:                         8.0    9.64  )-----------( 248      ( 13 Sep 2023 06:36 )             25.4     09-13    132<L>  |  89<L>  |  62<H>  ----------------------------<  90  4.5   |  23  |  11.72<H>    Ca    10.5      13 Sep 2023 06:36        Urinalysis Basic - ( 13 Sep 2023 06:36 )    Color: x / Appearance: x / SG: x / pH: x  Gluc: 90 mg/dL / Ketone: x  / Bili: x / Urobili: x   Blood: x / Protein: x / Nitrite: x   Leuk Esterase: x / RBC: x / WBC x   Sq Epi: x / Non Sq Epi: x / Bacteria: x            RADIOLOGY, EKG & ADDITIONAL TESTS: Reviewed.

## 2023-09-13 NOTE — PROGRESS NOTE ADULT - ASSESSMENT
Patient is a 58y Female whom presented to the hospital with shortness of breath  He has H/O ESRD on  hemodialysis MWF last HD on Friday.   Presented to ED with shortness of breath requiring non-rebreather mask  renal consulted for emergent dialysis     # ESRD.  HD CENTER: [facility] dialysis  MWF  [facility]  obtained informed consent- in the chart  plan:  patient remains volume overloaded with HFNC due to low oxygenation  tolerated urgent HD yesterday but requires UF session today  procrit on HD   resume renvela TD AC  daily BMP  dose meds for EGFR<15MLS/MIN       For any question, call:  Cell # [phone]  Pager # [phone]  Callback # [phone]
Patient is a 58y Female whom presented to the hospital with shortness of breath  He has H/O ESRD on  hemodialysis MWF last HD on Friday.   Presented to ED with shortness of breath requiring non-rebreather mask  renal consulted for emergent dialysis     # ESRD.  HD CENTER: [facility] dialysis  MWF  [facility]  obtained informed consent- in the chart  plan:  patient remains volume overloaded with 3L NC due to deoxygenation  tolerated UF yesterday -> plan for UF session today to optimize and should be stable for DC home after AM HD tomorrow  procrit on HD   resume renvela TD AC  daily BMP  dose meds for EGFR<15MLS/MIN       For any question, call:  Cell # [phone]  Pager # [phone]  Callback # [phone]
Patient is a 58y Female whom presented to the hospital with shortness of breath  He has H/O ESRD on  hemodialysis MWF last HD on Friday.   Presented to ED with shortness of breath requiring non-rebreather mask  renal consulted for emergent dialysis     # ESRD.  HD CENTER: [facility] dialysis  MWF  [facility]  obtained informed consent- in the chart  plan:  s/p HD today- tolerated well  trend bmp  next HD friday      anemia in ckd-  procrit on HD   trend hgb    bmd-    on renvela  daily BMP      Dr Corral  [phone]
58 year old M with PMH ESRD on HD MWF, HTN, HLD presenting for shortness of breath and fluid overload due to missed HD and pneumonia. 
58 year old M with PMH ESRD on HD MWF, HTN, HLD presenting for shortness of breath and fluid overload due to missed HD and pneumonia. 
58 year old M with PMH ESRD on HD MWF, HTN, HLD presenting for shortness of breath and fluid overload.

## 2023-09-13 NOTE — DISCHARGE NOTE PROVIDER - NSDCCPCAREPLAN_GEN_ALL_CORE_FT
PRINCIPAL DISCHARGE DIAGNOSIS  Diagnosis: Shortness of breath  Assessment and Plan of Treatment: CT chest with findings likely representing a combination of fluid overload and pneumonia.  Pneumonia is a lung infection that can cause a fever, cough, and trouble breathing.  Continue all antibiotics as ordered until complete.  Nutrition is important, eat small frequent meals.  Get lots of rest and drink fluids.  Call your health care provider upon arrival home from hospital and make a follow up appointment for one week.  If your cough worsens, you develop fever greater than 101', you have shaking chills, a fast heartbeat, trouble breathing and/or feel your are breathing much faster than usual, call your healthcare provider.  Make sure you wash your hands frequently.        SECONDARY DISCHARGE DIAGNOSES  Diagnosis: ESRD on dialysis  Assessment and Plan of Treatment: continue dialysis as scheduled    Diagnosis: Fluid overload  Assessment and Plan of Treatment: Please continue all medications as prescribed   Follow-up with your primary care physician within 1 week. Call for appointment.  Please bring all discharge paperwork and list of medications to all follow up appointments  Please call for follow up appointments one day after discharge       PRINCIPAL DISCHARGE DIAGNOSIS  Diagnosis: Shortness of breath  Assessment and Plan of Treatment: CT chest with findings likely representing a combination of fluid overload and pneumonia.  Pneumonia is a lung infection that can cause a fever, cough, and trouble breathing.  Continue all antibiotics as ordered until complete.  Nutrition is important, eat small frequent meals.  Get lots of rest and drink fluids.  Call your health care provider upon arrival home from hospital and make a follow up appointment for one week.  If your cough worsens, you develop fever greater than 101', you have shaking chills, a fast heartbeat, trouble breathing and/or feel your are breathing much faster than usual, call your healthcare provider.  Make sure you wash your hands frequently.        SECONDARY DISCHARGE DIAGNOSES  Diagnosis: Fluid overload  Assessment and Plan of Treatment: Please continue all medications as prescribed   Follow-up with your primary care physician within 1 week. Call for appointment.  Please bring all discharge paperwork and list of medications to all follow up appointments  Please call for follow up appointments one day after discharge      Diagnosis: ESRD on dialysis  Assessment and Plan of Treatment: continue dialysis as scheduled  Continue restricting your fluid intact to 1.2L per day

## 2023-09-13 NOTE — PROGRESS NOTE ADULT - NSPROGADDITIONALINFOA_GEN_ALL_CORE
disposition: ct chest, rvp, tte, abg, cards consult pending    Mohini Gaitan D.O.  Division of Hospital Medicine  Available on MS Teams
disposition: DC home 9/13  discussed with ACP    1 hour spent in preparation of discharge    Mohini Gaitan D.O.  Division of Hospital Medicine  Available on MS Teams
disposition: pending euvolemic state, wean o2 as tolerated- home when ready    Mohini Gaitan D.O.  Division of Hospital Medicine  Available on MS Teams

## 2023-09-15 LAB
CULTURE RESULTS: SIGNIFICANT CHANGE UP
SPECIMEN SOURCE: SIGNIFICANT CHANGE UP

## 2023-10-10 NOTE — PACU DISCHARGE NOTE - PAIN:
Spoke with patient and confirmed appt change from 12/12/2023 to 12/08/2023.    BHARAT Rivas MA  
Controlled with current regime
Controlled with current regime

## 2023-10-15 ENCOUNTER — INPATIENT (INPATIENT)
Facility: HOSPITAL | Age: 58
LOS: 3 days | Discharge: HOME | DRG: 564 | End: 2023-10-19
Attending: STUDENT IN AN ORGANIZED HEALTH CARE EDUCATION/TRAINING PROGRAM | Admitting: STUDENT IN AN ORGANIZED HEALTH CARE EDUCATION/TRAINING PROGRAM
Payer: MEDICAID

## 2023-10-15 VITALS
RESPIRATION RATE: 16 BRPM | HEART RATE: 75 BPM | HEIGHT: 70 IN | OXYGEN SATURATION: 99 % | WEIGHT: 160.94 LBS | DIASTOLIC BLOOD PRESSURE: 91 MMHG | SYSTOLIC BLOOD PRESSURE: 161 MMHG | TEMPERATURE: 97 F

## 2023-10-15 DIAGNOSIS — I77.0 ARTERIOVENOUS FISTULA, ACQUIRED: Chronic | ICD-10-CM

## 2023-10-15 DIAGNOSIS — Z98.89 OTHER SPECIFIED POSTPROCEDURAL STATES: Chronic | ICD-10-CM

## 2023-10-15 LAB
ALBUMIN SERPL ELPH-MCNC: 3.4 G/DL — SIGNIFICANT CHANGE UP (ref 3.3–5)
ALP SERPL-CCNC: 147 U/L — HIGH (ref 40–120)
ALT FLD-CCNC: 16 U/L — SIGNIFICANT CHANGE UP (ref 10–45)
ANION GAP SERPL CALC-SCNC: 15 MMOL/L — SIGNIFICANT CHANGE UP (ref 5–17)
AST SERPL-CCNC: 14 U/L — SIGNIFICANT CHANGE UP (ref 10–40)
BASE EXCESS BLDV CALC-SCNC: 2.6 MMOL/L — SIGNIFICANT CHANGE UP (ref -2–3)
BILIRUB SERPL-MCNC: 0.3 MG/DL — SIGNIFICANT CHANGE UP (ref 0.2–1.2)
BUN SERPL-MCNC: 62 MG/DL — HIGH (ref 7–23)
CA-I SERPL-SCNC: 1.12 MMOL/L — LOW (ref 1.15–1.33)
CALCIUM SERPL-MCNC: 8.9 MG/DL — SIGNIFICANT CHANGE UP (ref 8.4–10.5)
CHLORIDE BLDV-SCNC: 101 MMOL/L — SIGNIFICANT CHANGE UP (ref 96–108)
CHLORIDE SERPL-SCNC: 98 MMOL/L — SIGNIFICANT CHANGE UP (ref 96–108)
CO2 BLDV-SCNC: 28 MMOL/L — HIGH (ref 22–26)
CO2 SERPL-SCNC: 25 MMOL/L — SIGNIFICANT CHANGE UP (ref 22–31)
CREAT SERPL-MCNC: 10.9 MG/DL — HIGH (ref 0.5–1.3)
EGFR: 5 ML/MIN/1.73M2 — LOW
GAS PNL BLDV: 135 MMOL/L — LOW (ref 136–145)
GAS PNL BLDV: SIGNIFICANT CHANGE UP
GLUCOSE BLDV-MCNC: 94 MG/DL — SIGNIFICANT CHANGE UP (ref 70–99)
GLUCOSE SERPL-MCNC: 95 MG/DL — SIGNIFICANT CHANGE UP (ref 70–99)
HCO3 BLDV-SCNC: 27 MMOL/L — SIGNIFICANT CHANGE UP (ref 22–29)
HCT VFR BLD CALC: 31.9 % — LOW (ref 39–50)
HCT VFR BLDA CALC: 31 % — LOW (ref 39–51)
HGB BLD CALC-MCNC: 10.3 G/DL — LOW (ref 12.6–17.4)
HGB BLD-MCNC: 10 G/DL — LOW (ref 13–17)
LACTATE BLDV-MCNC: 0.8 MMOL/L — SIGNIFICANT CHANGE UP (ref 0.5–2)
MCHC RBC-ENTMCNC: 27.8 PG — SIGNIFICANT CHANGE UP (ref 27–34)
MCHC RBC-ENTMCNC: 31.3 GM/DL — LOW (ref 32–36)
MCV RBC AUTO: 88.6 FL — SIGNIFICANT CHANGE UP (ref 80–100)
NRBC # BLD: 0 /100 WBCS — SIGNIFICANT CHANGE UP (ref 0–0)
PCO2 BLDV: 41 MMHG — LOW (ref 42–55)
PH BLDV: 7.43 — SIGNIFICANT CHANGE UP (ref 7.32–7.43)
PLATELET # BLD AUTO: 119 K/UL — LOW (ref 150–400)
PO2 BLDV: 50 MMHG — HIGH (ref 25–45)
POTASSIUM BLDV-SCNC: 4.8 MMOL/L — SIGNIFICANT CHANGE UP (ref 3.5–5.1)
POTASSIUM SERPL-MCNC: 4.7 MMOL/L — SIGNIFICANT CHANGE UP (ref 3.5–5.3)
POTASSIUM SERPL-SCNC: 4.7 MMOL/L — SIGNIFICANT CHANGE UP (ref 3.5–5.3)
PROT SERPL-MCNC: 7.4 G/DL — SIGNIFICANT CHANGE UP (ref 6–8.3)
RBC # BLD: 3.6 M/UL — LOW (ref 4.2–5.8)
RBC # FLD: 18.4 % — HIGH (ref 10.3–14.5)
SAO2 % BLDV: 78.9 % — SIGNIFICANT CHANGE UP (ref 67–88)
SODIUM SERPL-SCNC: 138 MMOL/L — SIGNIFICANT CHANGE UP (ref 135–145)
WBC # BLD: 7.59 K/UL — SIGNIFICANT CHANGE UP (ref 3.8–10.5)
WBC # FLD AUTO: 7.59 K/UL — SIGNIFICANT CHANGE UP (ref 3.8–10.5)

## 2023-10-15 PROCEDURE — 76377 3D RENDER W/INTRP POSTPROCES: CPT | Mod: 26

## 2023-10-15 PROCEDURE — 72192 CT PELVIS W/O DYE: CPT | Mod: 26,MA

## 2023-10-15 PROCEDURE — 73552 X-RAY EXAM OF FEMUR 2/>: CPT | Mod: 26,LT

## 2023-10-15 PROCEDURE — 99285 EMERGENCY DEPT VISIT HI MDM: CPT

## 2023-10-15 PROCEDURE — 73501 X-RAY EXAM HIP UNI 1 VIEW: CPT | Mod: 26,LT

## 2023-10-15 PROCEDURE — 72170 X-RAY EXAM OF PELVIS: CPT | Mod: 26

## 2023-10-15 RX ORDER — OXYCODONE HYDROCHLORIDE 5 MG/1
5 TABLET ORAL ONCE
Refills: 0 | Status: DISCONTINUED | OUTPATIENT
Start: 2023-10-15 | End: 2023-10-15

## 2023-10-15 RX ORDER — MORPHINE SULFATE 50 MG/1
4 CAPSULE, EXTENDED RELEASE ORAL ONCE
Refills: 0 | Status: DISCONTINUED | OUTPATIENT
Start: 2023-10-15 | End: 2023-10-15

## 2023-10-15 RX ORDER — ACETAMINOPHEN 500 MG
650 TABLET ORAL ONCE
Refills: 0 | Status: COMPLETED | OUTPATIENT
Start: 2023-10-15 | End: 2023-10-15

## 2023-10-15 RX ADMIN — OXYCODONE HYDROCHLORIDE 5 MILLIGRAM(S): 5 TABLET ORAL at 20:58

## 2023-10-15 RX ADMIN — MORPHINE SULFATE 4 MILLIGRAM(S): 50 CAPSULE, EXTENDED RELEASE ORAL at 23:52

## 2023-10-15 RX ADMIN — Medication 650 MILLIGRAM(S): at 20:59

## 2023-10-15 NOTE — ED PROVIDER NOTE - PROGRESS NOTE DETAILS
bethanie attending- patient signed out to me Pending CT results and admission for further management patient CT unremarkable discussed with patient needs admission for further physical therapy evaluation otherwise hemodialysis in the morning discussed with hospitalist admitted for further management, Unlikely septic arthritis due to improvement in pain with IV pain medication administration also passive range of motion intact fever muscle spasm

## 2023-10-15 NOTE — ED PROVIDER NOTE - PHYSICAL EXAMINATION
GENERAL: Awake, alert, NAD  HEENT: NC/AT, moist mucous membranes, EOMI  LUNGS: CTAB, no wheezes or crackles   CARDIAC: RRR, no m/r/g  ABDOMEN: Soft, non tender, non distended, no rebound, no guarding  BACK: No midline spinal tenderness, no CVA tenderness  EXT: L hip and femur tender to palpation. Patient unable to lift hip and left leg against gravity.  NEURO: A&Ox3. Moving all extremities.  SKIN: Warm and dry. No rash.  PSYCH: Normal affect.

## 2023-10-15 NOTE — ED ADULT NURSE NOTE - NSFALLHARMRISKINTERV_ED_ALL_ED
Assistance OOB with selected safe patient handling equipment if applicable/Assistance with ambulation/Communicate risk of Fall with Harm to all staff, patient, and family/Monitor gait and stability/Provide visual cue: red socks, yellow wristband, yellow gown, etc/Reinforce activity limits and safety measures with patient and family/Bed in lowest position, wheels locked, appropriate side rails in place/Call bell, personal items and telephone in reach/Instruct patient to call for assistance before getting out of bed/chair/stretcher/Non-slip footwear applied when patient is off stretcher/[place] to call system/Physically safe environment - no spills, clutter or unnecessary equipment/Purposeful Proactive Rounding/Room/bathroom lighting operational, light cord in reach

## 2023-10-15 NOTE — ED PROVIDER NOTE - ATTENDING CONTRIBUTION TO CARE
58-year-old male patient past medical history of end-stage renal disease on dialysis (MWF), high blood pressure who presents to the emergency department for left hip and femur pain since this AM.  pt denies any trauma or injury.  Patient states that the pain started today did not take anything for it is no numbness or tingling no weakness numbness of the leg on the left.  Patient says he gets dialysis Monday Wednesday Friday last time was Friday.

## 2023-10-15 NOTE — ED PROVIDER NOTE - CLINICAL SUMMARY MEDICAL DECISION MAKING FREE TEXT BOX
58-year-old male patient who presents to the emergency department for atraumatic left hip and femur pain since this AM.  Patient found with tenderness to palpation of the hip and femur.  Will eval with x-ray and will give medication for pain.

## 2023-10-15 NOTE — ED PROVIDER NOTE - OBJECTIVE STATEMENT
58-year-old male patient past medical history of end-stage renal disease on dialysis (MWF), high blood pressure who presents to the emergency department for left hip and femur pain since this AM.  Patient endorses he woke up with the pain this AM.  Denies any traumatic injuries.  However does endorse prior surgery after a femur fracture.  Denies taking any medication at home.  Pain is worse with weightbearing and hip range of motion.

## 2023-10-15 NOTE — ED ADULT NURSE NOTE - OBJECTIVE STATEMENT
Pt states he was awakened from sleep today with severe back pain radiating to right thigh with diff walking due to pain.  Denies trauma.  Currently declining to get undressed until pain meds start to work, pt noted to be sleepy prior to pain meds, but states pain is very severe.  AV fistula present on left upper arm with thrill present.

## 2023-10-16 ENCOUNTER — APPOINTMENT (OUTPATIENT)
Dept: VASCULAR SURGERY | Facility: CLINIC | Age: 58
End: 2023-10-16

## 2023-10-16 DIAGNOSIS — Z29.9 ENCOUNTER FOR PROPHYLACTIC MEASURES, UNSPECIFIED: ICD-10-CM

## 2023-10-16 DIAGNOSIS — M25.552 PAIN IN LEFT HIP: ICD-10-CM

## 2023-10-16 DIAGNOSIS — I10 ESSENTIAL (PRIMARY) HYPERTENSION: ICD-10-CM

## 2023-10-16 DIAGNOSIS — N18.6 END STAGE RENAL DISEASE: ICD-10-CM

## 2023-10-16 LAB
ALBUMIN SERPL ELPH-MCNC: 3.1 G/DL — LOW (ref 3.3–5)
ALP SERPL-CCNC: 142 U/L — HIGH (ref 40–120)
ALT FLD-CCNC: 11 U/L — SIGNIFICANT CHANGE UP (ref 10–45)
ANION GAP SERPL CALC-SCNC: 19 MMOL/L — HIGH (ref 5–17)
AST SERPL-CCNC: 9 U/L — LOW (ref 10–40)
BILIRUB SERPL-MCNC: 0.4 MG/DL — SIGNIFICANT CHANGE UP (ref 0.2–1.2)
BUN SERPL-MCNC: 71 MG/DL — HIGH (ref 7–23)
CALCIUM SERPL-MCNC: 9 MG/DL — SIGNIFICANT CHANGE UP (ref 8.4–10.5)
CHLORIDE SERPL-SCNC: 97 MMOL/L — SIGNIFICANT CHANGE UP (ref 96–108)
CO2 SERPL-SCNC: 21 MMOL/L — LOW (ref 22–31)
CREAT SERPL-MCNC: 12.18 MG/DL — HIGH (ref 0.5–1.3)
CRP SERPL-MCNC: 60 MG/L — HIGH (ref 0–4)
EGFR: 4 ML/MIN/1.73M2 — LOW
ERYTHROCYTE [SEDIMENTATION RATE] IN BLOOD: 120 MM/HR — HIGH (ref 0–20)
ERYTHROCYTE [SEDIMENTATION RATE] IN BLOOD: 120 MM/HR — HIGH (ref 0–20)
GLUCOSE SERPL-MCNC: 78 MG/DL — SIGNIFICANT CHANGE UP (ref 70–99)
HCT VFR BLD CALC: 33 % — LOW (ref 39–50)
HGB BLD-MCNC: 10.3 G/DL — LOW (ref 13–17)
MCHC RBC-ENTMCNC: 28.1 PG — SIGNIFICANT CHANGE UP (ref 27–34)
MCHC RBC-ENTMCNC: 31.2 GM/DL — LOW (ref 32–36)
MCV RBC AUTO: 90.2 FL — SIGNIFICANT CHANGE UP (ref 80–100)
NRBC # BLD: 0 /100 WBCS — SIGNIFICANT CHANGE UP (ref 0–0)
PLATELET # BLD AUTO: 118 K/UL — LOW (ref 150–400)
POTASSIUM SERPL-MCNC: 4.8 MMOL/L — SIGNIFICANT CHANGE UP (ref 3.5–5.3)
POTASSIUM SERPL-SCNC: 4.8 MMOL/L — SIGNIFICANT CHANGE UP (ref 3.5–5.3)
PROT SERPL-MCNC: 7.3 G/DL — SIGNIFICANT CHANGE UP (ref 6–8.3)
RBC # BLD: 3.66 M/UL — LOW (ref 4.2–5.8)
RBC # FLD: 18.4 % — HIGH (ref 10.3–14.5)
SODIUM SERPL-SCNC: 137 MMOL/L — SIGNIFICANT CHANGE UP (ref 135–145)
WBC # BLD: 8.28 K/UL — SIGNIFICANT CHANGE UP (ref 3.8–10.5)
WBC # FLD AUTO: 8.28 K/UL — SIGNIFICANT CHANGE UP (ref 3.8–10.5)

## 2023-10-16 PROCEDURE — 99223 1ST HOSP IP/OBS HIGH 75: CPT

## 2023-10-16 PROCEDURE — 99253 IP/OBS CNSLTJ NEW/EST LOW 45: CPT

## 2023-10-16 RX ORDER — AMLODIPINE BESYLATE 2.5 MG/1
10 TABLET ORAL DAILY
Refills: 0 | Status: DISCONTINUED | OUTPATIENT
Start: 2023-10-16 | End: 2023-10-19

## 2023-10-16 RX ORDER — SENNA PLUS 8.6 MG/1
2 TABLET ORAL AT BEDTIME
Refills: 0 | Status: DISCONTINUED | OUTPATIENT
Start: 2023-10-16 | End: 2023-10-19

## 2023-10-16 RX ORDER — LISINOPRIL 2.5 MG/1
40 TABLET ORAL DAILY
Refills: 0 | Status: DISCONTINUED | OUTPATIENT
Start: 2023-10-16 | End: 2023-10-19

## 2023-10-16 RX ORDER — ACETAMINOPHEN 500 MG
1000 TABLET ORAL ONCE
Refills: 0 | Status: COMPLETED | OUTPATIENT
Start: 2023-10-16 | End: 2023-10-16

## 2023-10-16 RX ORDER — CINACALCET 30 MG/1
30 TABLET, FILM COATED ORAL DAILY
Refills: 0 | Status: DISCONTINUED | OUTPATIENT
Start: 2023-10-16 | End: 2023-10-19

## 2023-10-16 RX ORDER — AMLODIPINE BESYLATE 2.5 MG/1
1 TABLET ORAL
Refills: 0 | DISCHARGE

## 2023-10-16 RX ORDER — MORPHINE SULFATE 50 MG/1
4 CAPSULE, EXTENDED RELEASE ORAL ONCE
Refills: 0 | Status: DISCONTINUED | OUTPATIENT
Start: 2023-10-16 | End: 2023-10-16

## 2023-10-16 RX ORDER — ACETAMINOPHEN 500 MG
650 TABLET ORAL EVERY 6 HOURS
Refills: 0 | Status: COMPLETED | OUTPATIENT
Start: 2023-10-16 | End: 2023-10-18

## 2023-10-16 RX ORDER — NICOTINE POLACRILEX 2 MG
1 GUM BUCCAL DAILY
Refills: 0 | Status: DISCONTINUED | OUTPATIENT
Start: 2023-10-16 | End: 2023-10-19

## 2023-10-16 RX ORDER — TRAMADOL HYDROCHLORIDE 50 MG/1
25 TABLET ORAL EVERY 6 HOURS
Refills: 0 | Status: DISCONTINUED | OUTPATIENT
Start: 2023-10-16 | End: 2023-10-16

## 2023-10-16 RX ORDER — LABETALOL HCL 100 MG
300 TABLET ORAL THREE TIMES A DAY
Refills: 0 | Status: DISCONTINUED | OUTPATIENT
Start: 2023-10-16 | End: 2023-10-19

## 2023-10-16 RX ORDER — LIDOCAINE 4 G/100G
1 CREAM TOPICAL ONCE
Refills: 0 | Status: COMPLETED | OUTPATIENT
Start: 2023-10-16 | End: 2023-10-16

## 2023-10-16 RX ORDER — LIDOCAINE 4 G/100G
1 CREAM TOPICAL DAILY
Refills: 0 | Status: DISCONTINUED | OUTPATIENT
Start: 2023-10-16 | End: 2023-10-19

## 2023-10-16 RX ORDER — TRAMADOL HYDROCHLORIDE 50 MG/1
25 TABLET ORAL EVERY 12 HOURS
Refills: 0 | Status: DISCONTINUED | OUTPATIENT
Start: 2023-10-16 | End: 2023-10-18

## 2023-10-16 RX ORDER — HYDRALAZINE HCL 50 MG
100 TABLET ORAL THREE TIMES A DAY
Refills: 0 | Status: DISCONTINUED | OUTPATIENT
Start: 2023-10-16 | End: 2023-10-19

## 2023-10-16 RX ORDER — GABAPENTIN 400 MG/1
300 CAPSULE ORAL DAILY
Refills: 0 | Status: DISCONTINUED | OUTPATIENT
Start: 2023-10-16 | End: 2023-10-19

## 2023-10-16 RX ORDER — POLYETHYLENE GLYCOL 3350 17 G/17G
17 POWDER, FOR SOLUTION ORAL DAILY
Refills: 0 | Status: DISCONTINUED | OUTPATIENT
Start: 2023-10-16 | End: 2023-10-19

## 2023-10-16 RX ORDER — LANOLIN ALCOHOL/MO/W.PET/CERES
3 CREAM (GRAM) TOPICAL AT BEDTIME
Refills: 0 | Status: DISCONTINUED | OUTPATIENT
Start: 2023-10-16 | End: 2023-10-19

## 2023-10-16 RX ORDER — CHOLECALCIFEROL (VITAMIN D3) 125 MCG
1 CAPSULE ORAL
Refills: 0 | DISCHARGE

## 2023-10-16 RX ADMIN — Medication 400 MILLIGRAM(S): at 07:35

## 2023-10-16 RX ADMIN — Medication 1 PATCH: at 12:28

## 2023-10-16 RX ADMIN — CINACALCET 30 MILLIGRAM(S): 30 TABLET, FILM COATED ORAL at 13:19

## 2023-10-16 RX ADMIN — Medication 100 MILLIGRAM(S): at 21:17

## 2023-10-16 RX ADMIN — LIDOCAINE 1 PATCH: 4 CREAM TOPICAL at 07:34

## 2023-10-16 RX ADMIN — MORPHINE SULFATE 4 MILLIGRAM(S): 50 CAPSULE, EXTENDED RELEASE ORAL at 21:16

## 2023-10-16 RX ADMIN — GABAPENTIN 300 MILLIGRAM(S): 400 CAPSULE ORAL at 12:10

## 2023-10-16 RX ADMIN — Medication 1 PATCH: at 19:00

## 2023-10-16 RX ADMIN — MORPHINE SULFATE 4 MILLIGRAM(S): 50 CAPSULE, EXTENDED RELEASE ORAL at 21:45

## 2023-10-16 RX ADMIN — Medication 650 MILLIGRAM(S): at 12:10

## 2023-10-16 RX ADMIN — Medication 650 MILLIGRAM(S): at 00:57

## 2023-10-16 RX ADMIN — OXYCODONE HYDROCHLORIDE 5 MILLIGRAM(S): 5 TABLET ORAL at 00:57

## 2023-10-16 RX ADMIN — LIDOCAINE 1 PATCH: 4 CREAM TOPICAL at 20:00

## 2023-10-16 RX ADMIN — MORPHINE SULFATE 4 MILLIGRAM(S): 50 CAPSULE, EXTENDED RELEASE ORAL at 00:57

## 2023-10-16 RX ADMIN — Medication 300 MILLIGRAM(S): at 21:17

## 2023-10-16 NOTE — H&P ADULT - NSHPREVIEWOFSYSTEMS_GEN_ALL_CORE
Review of Systems:   CONSTITUTIONAL: No fever  EYES: No eye pain, visual disturbances, or discharge  ENMT:  No difficulty hearing, tinnitus, vertigo; No sinus or throat pain  RESPIRATORY: No SOB. No cough, wheezing, chills or hemoptysis  CARDIOVASCULAR: No chest pain, palpitations, dizziness, or leg swelling  GASTROINTESTINAL: No abdominal or epigastric pain. No nausea, vomiting, or hematemesis; No diarrhea or constipation. No melena or hematochezia.  GENITOURINARY: No dysuria, frequency, hematuria  NEUROLOGICAL: No headaches  SKIN: No itching, burning  MUSCULOSKELETAL: +L hip pain  PSYCHIATRIC: No depression, anxiety  HEME/LYMPH: No easy bruising, or bleeding gums

## 2023-10-16 NOTE — CONSULT NOTE ADULT - SUBJECTIVE AND OBJECTIVE BOX
57y/o M presents to the ED c/o L Hip pain since this morning. Community ambulator w/o assistance at baseline. Pt states he woke up this morning w/ L Hip and thigh pain, limiting ambulation secondary to pain. No recent trauma or falls. Hx of L Femoral Shaft Fx s/p ORIF and CATRACHO in 1990. Denies CP, SOB, fever, chills or any other complaints.     LABS:                        10.3   8.28  )-----------( 118      ( 16 Oct 2023 10:40 )             33.0     10-16    137  |  97  |  71<H>  ----------------------------<  78  4.8   |  21<L>  |  12.18<H>    Ca    9.0      16 Oct 2023 10:40    TPro  7.3  /  Alb  3.1<L>  /  TBili  0.4  /  DBili  x   /  AST  9<L>  /  ALT  11  /  AlkPhos  142<H>  10-16      Urinalysis Basic - ( 16 Oct 2023 10:40 )    Color: x / Appearance: x / SG: x / pH: x  Gluc: 78 mg/dL / Ketone: x  / Bili: x / Urobili: x   Blood: x / Protein: x / Nitrite: x   Leuk Esterase: x / RBC: x / WBC x   Sq Epi: x / Non Sq Epi: x / Bacteria: x        VITAL SIGNS:  T(C): 36.6 (10-16-23 @ 06:34), Max: 36.6 (10-16-23 @ 00:45)  HR: 87 (10-16-23 @ 06:34) (75 - 87)  BP: 181/92 (10-16-23 @ 06:34) (161/91 - 189/90)  RR: 16 (10-16-23 @ 06:34) (16 - 17)  SpO2: 97% (10-16-23 @ 06:34) (97% - 99%)    PE:   LLE:   Skin intact, no redness or warmth  TTP at hip diffusely, NTTP elsewhere along extremity  Unable to SLR secondary to pain  AROM limited secondary to pain  PROM 0-20 w/ terminal flexion pain  No pain w/ micromotion  + EHL/FHL/GS/TA  SILT Tib/SPN/DPN/Sural/Saph  2+ DP  Compartments soft and compressible  No Calf TTP    AP: 57y/o M w/ L Hip pain x1 day.     LLE WBAT  Pain control prn  DVT PPx: SCDs.   FU ESR and BCx  CRP: 60  IR Consult for L Hip aspiration to r/o septic joint. Please Send Cx/GS/CC/Crystals.   Medical management appreciated.   Discussed plan w/ Dr. Morrell who agrees.  59y/o M presents to the ED c/o L Hip pain since this morning. Community ambulator w/o assistance at baseline. Pt states he woke up this morning w/ L Hip and thigh pain, limiting ambulation secondary to pain. No recent trauma or falls. Hx of L Femoral Shaft Fx s/p ORIF and CATRACHO in 1990. Denies CP, SOB, fever, chills or any other complaints.     LABS:                        10.3   8.28  )-----------( 118      ( 16 Oct 2023 10:40 )             33.0     10-16    137  |  97  |  71<H>  ----------------------------<  78  4.8   |  21<L>  |  12.18<H>    Ca    9.0      16 Oct 2023 10:40    TPro  7.3  /  Alb  3.1<L>  /  TBili  0.4  /  DBili  x   /  AST  9<L>  /  ALT  11  /  AlkPhos  142<H>  10-16      Urinalysis Basic - ( 16 Oct 2023 10:40 )    Color: x / Appearance: x / SG: x / pH: x  Gluc: 78 mg/dL / Ketone: x  / Bili: x / Urobili: x   Blood: x / Protein: x / Nitrite: x   Leuk Esterase: x / RBC: x / WBC x   Sq Epi: x / Non Sq Epi: x / Bacteria: x        VITAL SIGNS:  T(C): 36.6 (10-16-23 @ 06:34), Max: 36.6 (10-16-23 @ 00:45)  HR: 87 (10-16-23 @ 06:34) (75 - 87)  BP: 181/92 (10-16-23 @ 06:34) (161/91 - 189/90)  RR: 16 (10-16-23 @ 06:34) (16 - 17)  SpO2: 97% (10-16-23 @ 06:34) (97% - 99%)    PE:   LLE:   Skin intact, no redness or warmth  TTP at hip diffusely, NTTP elsewhere along extremity  Unable to SLR secondary to pain  AROM limited secondary to pain  PROM 0-20 w/ terminal flexion pain  No pain w/ micromotion  + EHL/FHL/GS/TA  SILT Tib/SPN/DPN/Sural/Saph  2+ DP  Compartments soft and compressible  No Calf TTP    AP: 59y/o M w/ L Hip pain x1 day.     NPO after midnight, IVF while NPO - Keep NPO until IR Hip Aspiration is done and resulted.   FU Preop labs  LLE WBAT  Pain control prn  DVT PPx: SCDs.   FU ESR and BCx  CRP: 60  IR Consult for L Hip aspiration to r/o septic joint. Please Send Cx/GS/CC/Crystals.   Medical management appreciated. PLEASE DOCUMENT MEDICAL CLEARANCE FOR POSSIBLE OR PENDING IR ASPIRATION TOMORROW.   Discussed plan w/ Dr. Morrell who agrees.

## 2023-10-16 NOTE — CONSULT NOTE ADULT - SUBJECTIVE AND OBJECTIVE BOX
Interventional Radiology    Evaluate for Procedure:     HPI: 57 y/o M w/ PMHx of ESRD on HD MWF, HTN presenting with one day history of L hip/leg pain. Patient reports he was in usual state of health until morning PTA when he woke up with severe L hip and thigh pain. Denies any trauma to the area or falls. Movement worsens pain. No obvious alleviating factors. Says at baseline no assistive devices needed for ambulation, however now since the pain has started, he is unable to ambulate due to severe pain. Admits to prior surgery of L hip/leg but unable to elaborate on what procedure. He otherwise denies any fevers, chills, CP, SOB, abd pain, n/v, diarrhea, dysuria.      CT done in ED with small left hip effusion.  IR consulted for possible aspiration.  Awaiting evaluation by ortho.     History of open reduction and internal fixation (ORIF) procedure Left Leg  1990  Hardware subsequently removed. Year unknown      Allergies: No Known Allergies    Medications (Abx/Cardiac/Anticoagulation/Blood Products)      Data:  177.8  73  T(C): 36.6  HR: 87  BP: 181/92  RR: 16  SpO2: 97%    -WBC 8.28 / HgB 10.3 / Hct 33.0 / Plt 118  -Na 137 / Cl 97 / BUN 71 / Glucose 78  -K 4.8 / CO2 21 / Cr 12.18  -ALT 11 / Alk Phos 142 / T.Bili 0.4  -INR 1.12 / PTT 32.7  C-Reactive Protein, Serum: 60 mg/L (10.16.23 @ 10:40)        Radiology:     < from: CT Pelvis Bony Only No Cont (10.15.23 @ 23:55) >    ACC: [id number] EXAM:  CT PELVIS BONY ONLY   ORDERED BY: WAYNE PILLAI     ACC: [id number] EXAM:  CT 3D RECONSTRUCT W [facility]   ORDERED BY: WAYNE PILLAI     PROCEDURE DATE:  10/15/2023          INTERPRETATION:  Exam Type: CT 3D RECONSTRUCTION W WORKSTATION, CT PELVIS   BONY  Exam Date and Time: 10/15/2023 11:49 PM  Indication: Left hip pain.  Comparison: CT abdomen and pelvis 8/22/2023. Same day prior left hip and   pelvis x-rays.    TECHNIQUE:  Multiplanar CT images of the bony pelvis were obtained without contrast.   3-D reformatted images were also obtained for evaluation.    FINDINGS:  No acute fracture or dislocation. Sclerotic and lucent appearance of the   osseous structures compatible with renal osteodystrophy. Tracks from   previous hardware in the proximal left femur identified. There are   subchondral irregularities and erosive changes involving the bilateral   sacroiliac joints compatible with a sacroiliitis. Pubic symphysis is   intact. There is a small left hip joint effusion. Bilateral hip joint   spaces are maintained without erosive changes or osseous destruction.   Partially imaged polycystic kidneys. Small amount of free fluid in the   pelvis. Colonic diverticulosis. Vascular calcification is present.      IMPRESSION:  1.  No acute fracture or dislocation.  2.  Findings of bilateral sacroiliitis, similar in appearance as compared   to prior CT abdomen and pelvis from 8/22/2023.  3.  Small left hip joint effusion of an uncertain etiology.    < end of copied text >    Assessment/Plan:   57 y/o M w/ PMHx of ESRD on HD MWF, HTN presenting with one day history of L hip/leg pain which he is unable to ambulate due to severe pain. Imaging with small left hip effusion.  IR consulted for possible aspiration.    - WBC wnl, no fever, chills.  C reactive protein elevated  - Awaiting evaluation by ortho.   - continue prn pain medication  - consult in progress Interventional Radiology    Evaluate for Procedure: left hip aspiration    HPI: 57 y/o M w/ PMHx of ESRD on HD MWF, HTN presenting with one day history of L hip/leg pain. Patient reports he was in usual state of health until morning PTA when he woke up with severe L hip and thigh pain. Denies any trauma to the area or falls. Movement worsens pain. No obvious alleviating factors. Says at baseline no assistive devices needed for ambulation, however now since the pain has started, he is unable to ambulate due to severe pain. History of open reduction and internal fixation (ORIF) procedure Left Leg  1990, Hardware subsequently removed. Year unknown. Per notes, pt denies fever, chills.     CT done in ED with small left hip effusion.  IR consulted for possible aspiration.  Evaluated by ortho team, recommending aspiration.     Allergies: No Known Allergies    Medications (Abx/Cardiac/Anticoagulation/Blood Products)      Data:  177.8  73  T(C): 36.6  HR: 87  BP: 181/92  RR: 16  SpO2: 97%    -WBC 8.28 / HgB 10.3 / Hct 33.0 / Plt 118  -Na 137 / Cl 97 / BUN 71 / Glucose 78  -K 4.8 / CO2 21 / Cr 12.18  -ALT 11 / Alk Phos 142 / T.Bili 0.4  -INR 1.12 / PTT 32.7  C-Reactive Protein, Serum: 60 mg/L (10.16.23 @ 10:40)        Radiology:     < from: CT Pelvis Bony Only No Cont (10.15.23 @ 23:55) >    ACC: [id number] EXAM:  CT PELVIS BONY ONLY   ORDERED BY: WAYNE PILLAI     ACC: [id number] EXAM:  CT 3D RECONSTRUCT W [facility]   ORDERED BY: WAYNE PILLAI     PROCEDURE DATE:  10/15/2023          INTERPRETATION:  Exam Type: CT 3D RECONSTRUCTION W WORKSTATION, CT PELVIS   BONY  Exam Date and Time: 10/15/2023 11:49 PM  Indication: Left hip pain.  Comparison: CT abdomen and pelvis 8/22/2023. Same day prior left hip and   pelvis x-rays.    TECHNIQUE:  Multiplanar CT images of the bony pelvis were obtained without contrast.   3-D reformatted images were also obtained for evaluation.    FINDINGS:  No acute fracture or dislocation. Sclerotic and lucent appearance of the   osseous structures compatible with renal osteodystrophy. Tracks from   previous hardware in the proximal left femur identified. There are   subchondral irregularities and erosive changes involving the bilateral   sacroiliac joints compatible with a sacroiliitis. Pubic symphysis is   intact. There is a small left hip joint effusion. Bilateral hip joint   spaces are maintained without erosive changes or osseous destruction.   Partially imaged polycystic kidneys. Small amount of free fluid in the   pelvis. Colonic diverticulosis. Vascular calcification is present.      IMPRESSION:  1.  No acute fracture or dislocation.  2.  Findings of bilateral sacroiliitis, similar in appearance as compared   to prior CT abdomen and pelvis from 8/22/2023.  3.  Small left hip joint effusion of an uncertain etiology.    < end of copied text >    Assessment/Plan:   57 y/o M w/ PMHx of ESRD on HD MWF, HTN presenting with one day history of L hip/leg pain which he is unable to ambulate due to severe pain. Imaging with small left hip effusion.  IR consulted for possible aspiration.      - pt with 1 day of left hip leg pain  - 10/15/23 CT pelvis with no acute fx, small left hip joint effusion  - WBC wnl, no fever, chills.  C reactive protein elevated  - ortho recommending L Hip aspiration to r/o septic joint, requesting to send Cx/GS/CC/Crystals.  - case reviewed and approved by Dr. Turcios  - plan for aspiration 10/17 in IR.  Pt does not have to be NPO, will be getting local anesthesia only  - continue prn pain medication  - reviewed with medicine team GERARD Mijares  - please contact x [phone] with any questions/concerns

## 2023-10-16 NOTE — ED POST DISCHARGE NOTE - RESULT SUMMARY
ED radiology discrepancy, sacroilitis unchanged from prior imaging in August and small left hip joint effusion noted on CT pelvis, pt currently admitted and these results are noted in the H&P. no need for pt contact by ED. -Forest Kwon PA-C

## 2023-10-16 NOTE — CONSULT NOTE ADULT - ASSESSMENT
58 year old with ESRD on HD. Presenting with hip pain.    AVF functioning well for HD.    Left leg pain unlikely to be vascular in nature given that the patient has no symptoms of PAD and has palpable pedal pulses.    Plan:  - No acute vascular surgery interventions  - Continue HD via AVF    Discussed with vascular surgery attending, Dr. Wray.      Vascular Surgery  [id number]
59 y/o M w/ PMHx of ESRD on HD MWF, HTN presenting with one day history of L hip/leg pain.      1) ESRD on HD-   HD [facility]  Access: left upper ext avf  Consent in chart  Plan for HD today--> 2k bath and uf 3kg as tolerated  trend bmp      2) Anemia in CKD  hgb at goal  trend hgb    Dr Corral  [phone]

## 2023-10-16 NOTE — CONSULT NOTE ADULT - ATTENDING COMMENTS
Small L hip effusion. Possible septic arthritis but cloudy clinical picture.  Recommend IR aspiration.  If infected, will need surgical I and D

## 2023-10-16 NOTE — H&P ADULT - NSHPLABSRESULTS_GEN_ALL_CORE
LABS:                         10.3   8.28  )-----------( 118      ( 16 Oct 2023 10:40 )             33.0     10-16    137  |  97  |  71<H>  ----------------------------<  78  4.8   |  21<L>  |  12.18<H>    Ca    9.0      16 Oct 2023 10:40    TPro  7.3  /  Alb  3.1<L>  /  TBili  0.4  /  DBili  x   /  AST  9<L>  /  ALT  11  /  AlkPhos  142<H>  10-16      Urinalysis Basic - ( 16 Oct 2023 10:40 )    Color: x / Appearance: x / SG: x / pH: x  Gluc: 78 mg/dL / Ketone: x  / Bili: x / Urobili: x   Blood: x / Protein: x / Nitrite: x   Leuk Esterase: x / RBC: x / WBC x   Sq Epi: x / Non Sq Epi: x / Bacteria: x

## 2023-10-16 NOTE — CONSULT NOTE ADULT - SUBJECTIVE AND OBJECTIVE BOX
Vascular Surgery Consult  Consulting attending: Dr. Wray    HPI:  Patient is a 58 year old male with PMHx significant for HTN and ESRD on HD presenting for one day of left hip and leg pain. Symptoms of leg pain were acute onset. Denies symptoms of claudication, rest pain, and tissue loss prior to onset of pain.    He has an AVF created by Dr. Wray. Functions well for HD. No issues with cannulation or flow. Last saw Dr. Wray in the office in March 2023 with duplex of AVF demonstrating appropriate flow volumes.        PAST MEDICAL HISTORY:  Benign Essential Hypertension    End Stage Renal Disease    Hypertension    GERD (gastroesophageal reflux disease)    ESRD on hemodialysis    Hyperlipidemia    Vitamin D deficiency    Tobacco use    HTN (hypertension)    HLD (hyperlipidemia)    ESRD (end stage renal disease) on dialysis        PAST SURGICAL HISTORY:  History of open reduction and internal fixation (ORIF) procedure    AV fistula    No significant past surgical history        MEDICATIONS:  acetaminophen     Tablet .. 650 milliGRAM(s) Oral every 6 hours  amLODIPine   Tablet 10 milliGRAM(s) Oral daily  cinacalcet 30 milliGRAM(s) Oral daily  gabapentin 300 milliGRAM(s) Oral daily  hydrALAZINE 100 milliGRAM(s) Oral three times a day  labetalol 300 milliGRAM(s) Oral three times a day  lidocaine   4% Patch 1 Patch Transdermal daily  lisinopril 40 milliGRAM(s) Oral daily  melatonin 3 milliGRAM(s) Oral at bedtime PRN  nicotine -  14 mG/24Hr(s) Patch 1 Patch Transdermal daily  polyethylene glycol 3350 17 Gram(s) Oral daily  senna 2 Tablet(s) Oral at bedtime  traMADol 25 milliGRAM(s) Oral every 12 hours PRN      ALLERGIES:  No Known Allergies      VITALS & I/Os:  Vital Signs Last 24 Hrs  T(C): 36.7 (16 Oct 2023 15:05), Max: 36.7 (16 Oct 2023 15:05)  T(F): 98.1 (16 Oct 2023 15:05), Max: 98.1 (16 Oct 2023 15:05)  HR: 91 (16 Oct 2023 15:05) (75 - 91)  BP: 176/96 (16 Oct 2023 15:05) (161/91 - 189/90)  BP(mean): 122 (16 Oct 2023 06:34) (122 - 122)  RR: 18 (16 Oct 2023 15:05) (16 - 18)  SpO2: 97% (16 Oct 2023 15:05) (97% - 99%)    Parameters below as of 16 Oct 2023 15:05  Patient On (Oxygen Delivery Method): room air        I&O's Summary      PHYSICAL EXAM:  Gen: NAD  CV: Regular rate  Resp: Nonlabored breathing on room air  Ext:  - LUE AVF cannulated and providing appropriate flow volumes for HD at time of evaluation (pt evaluated in dialysis suite)  - Bilateral lower extremities are warm and well perfused, no wounds noted  Vasc:  - Bilateral PT and DP pulses are palpable        LABS:                        10.3   8.28  )-----------( 118      ( 16 Oct 2023 10:40 )             33.0     10-16    137  |  97  |  71<H>  ----------------------------<  78  4.8   |  21<L>  |  12.18<H>    Ca    9.0      16 Oct 2023 10:40    TPro  7.3  /  Alb  3.1<L>  /  TBili  0.4  /  DBili  x   /  AST  9<L>  /  ALT  11  /  AlkPhos  142<H>  10-16    Lactate:  10-15 @ 22:35  0.8              Urinalysis Basic - ( 16 Oct 2023 10:40 )    Color: x / Appearance: x / SG: x / pH: x  Gluc: 78 mg/dL / Ketone: x  / Bili: x / Urobili: x   Blood: x / Protein: x / Nitrite: x   Leuk Esterase: x / RBC: x / WBC x   Sq Epi: x / Non Sq Epi: x / Bacteria: x        IMAGING:

## 2023-10-16 NOTE — H&P ADULT - NSHPSOCIALHISTORY_GEN_ALL_CORE
active tobacco use: 1pk/day  denies etoh use  denies drug use  no assistive devices used for ambulation PTA

## 2023-10-16 NOTE — ED ADULT NURSE REASSESSMENT NOTE - NS ED NURSE REASSESS COMMENT FT1
Report received from [facility] in [place]. As per RN, pt AxOx4. Upon assessment, pt resting in stretcher, breathing spontaneously and unlabored. Pt updated on plan of care, received bed, awaiting transportation. Safety and comfort measures maintained- bed in lowest position, locked, and blanket given.
pt back from dialysis.
received report from dialysis, no bed assignment so pt to be sent down to ED.
report given to dialysis RN may. Transport inputted for pt.
Received patient from RN Merle, patient at baseline mental status, able to make needs known, VSS, patient agreeable to plan of care, pending bed assignment, comfort and safety provided.

## 2023-10-16 NOTE — H&P ADULT - HISTORY OF PRESENT ILLNESS
59 y/o M w/ PMHx of ESRD on HD MWF, HTN presenting with one day history of L hip/leg pain. Patient reports he was in usual state of health until morning PTA when he woke up with severe L hip and thigh pain. Denies any trauma to the area or falls. Movement worsens pain. No obvious alleviating factors. Says at baseline no assistive devices needed for ambulation, however now since the pain has started, he is unable to ambulate due to severe pain. Admits to prior surgery of L hip/leg but unable to elaborate on what procedure. He otherwise denies any fevers, chills, CP, SOB, abd pain, n/v, diarrhea, dysuria.      In ED, afebrile HR 75, 161/91, 99% on RA. Plain films of L hip and pelvis w/o acute pathology. CT pelvis: No acute fracture or dislocation. Findings of bilateral sacroiliitis, similar in appearance as compared to prior CT abdomen and pelvis from 8/22/2023. Small left hip joint effusion of an uncertain etiology. Given IV tylenol, lidocaine patch, oxycodone, and IV morphine 4mg x1. Admitted to medicine for further management.

## 2023-10-16 NOTE — CONSULT NOTE ADULT - SUBJECTIVE AND OBJECTIVE BOX
[facility] Consult Note Nephrology - CONSULTATION NOTE    59 y/o M w/ PMHx of ESRD on HD MWF, HTN presenting with one day history of L hip/leg pain. Patient reports he was in usual state of health until morning PTA when he woke up with severe L hip and thigh pain. Denies any trauma to the area or falls. Movement worsens pain. No obvious alleviating factors. Says at baseline no assistive devices needed for ambulation, however now since the pain has started, he is unable to ambulate due to severe pain. Admits to prior surgery of L hip/leg but unable to elaborate on what procedure. He otherwise denies any fevers, chills, CP, SOB, abd pain, n/v, diarrhea, dysuria.      In ED, afebrile HR 75, 161/91, 99% on RA. Plain films of L hip and pelvis w/o acute pathology. CT pelvis: No acute fracture or dislocation. Findings of bilateral sacroiliitis, similar in appearance as compared to prior CT abdomen and pelvis from 8/22/2023. Small left hip joint effusion of an uncertain etiology. Given IV tylenol, lidocaine patch, oxycodone, and IV morphine 4mg x1. Admitted to medicine for further management.     Renal consult for ESRd on HD  last HD was on friday per pt  [facility]  via left upper ext avf  denies any f/c/n/v/d/c    PAST MEDICAL & SURGICAL HISTORY:  Benign Essential Hypertension      Hypertension      GERD (gastroesophageal reflux disease)      ESRD on hemodialysis      Hyperlipidemia      Vitamin D deficiency      Tobacco use      HTN (hypertension)      HLD (hyperlipidemia)      History of open reduction and internal fixation (ORIF) procedure  Left Leg  1990  Hardware subsequently removed. Year unknown      AV fistula  Left upper arm 2/2006      No significant past surgical history        No Known Allergies    Home Medications Reviewed  Hospital Medications:   MEDICATIONS  (STANDING):  acetaminophen     Tablet .. 650 milliGRAM(s) Oral every 6 hours  amLODIPine   Tablet 10 milliGRAM(s) Oral daily  cinacalcet 30 milliGRAM(s) Oral daily  gabapentin 300 milliGRAM(s) Oral daily  hydrALAZINE 100 milliGRAM(s) Oral three times a day  labetalol 300 milliGRAM(s) Oral three times a day  lidocaine   4% Patch 1 Patch Transdermal daily  lisinopril 40 milliGRAM(s) Oral daily  nicotine -  14 mG/24Hr(s) Patch 1 Patch Transdermal daily  polyethylene glycol 3350 17 Gram(s) Oral daily  senna 2 Tablet(s) Oral at bedtime    SOCIAL HISTORY:  Denies ETOh,Smoking,   FAMILY HISTORY:  Family history of chronic renal disease    Family history of lymphoma    Family history of hypertension in mother  HTN      REVIEW OF SYSTEMS:  CONSTITUTIONAL: No weakness, fevers or chills  EYES/ENT: No visual changes;  No vertigo or throat pain   NECK: No pain or stiffness  RESPIRATORY: No cough, wheezing, hemoptysis; No shortness of breath  CARDIOVASCULAR: No chest pain or palpitations.  GASTROINTESTINAL: No abdominal or epigastric pain. No nausea, vomiting, or hematemesis; No diarrhea or constipation. No melena or hematochezia.  GENITOURINARY: No dysuria, frequency, foamy urine, urinary urgency, incontinence or hematuria  NEUROLOGICAL: No numbness or weakness  SKIN: No itching, burning, rashes, or lesions   VASCULAR: No bilateral lower extremity edema.   All other review of systems is negative unless indicated above.    VITALS:  T(F): 97.8 (10-16-23 @ 06:34), Max: 97.8 (10-16-23 @ 00:45)  HR: 87 (10-16-23 @ 06:34)  BP: 181/92 (10-16-23 @ 06:34)  RR: 16 (10-16-23 @ 06:34)  SpO2: 97% (10-16-23 @ 06:34)  Wt(kg): --    Height (cm): 177.8 (10-15 @ 18:34)  Weight (kg): 73 (10-15 @ 18:34)  BMI (kg/m2): 23.1 (10-15 @ 18:34)  BSA (m2): 1.9 (10-15 @ 18:34)  PHYSICAL EXAM:  Constitutional: NAD  HEENT: anicteric sclera, oropharynx clear, MMM  Neck: No JVD  Respiratory: CTAB, no wheezes, rales or rhonchi  Cardiovascular: S1, S2, RRR  Gastrointestinal: BS+, soft, NT/ND  Extremities: No cyanosis or clubbing. No peripheral edema  Neurological: A/O x 3, no focal deficits  Psychiatric: Normal mood, normal affect  : No CVA tenderness. No rubio.   Skin: No rashes  Vascular Access: left uper ext avf + thrill + aneursym    LABS:  10-16    137  |  97  |  71<H>  ----------------------------<  78  4.8   |  21<L>  |  12.18<H>    Ca    9.0      16 Oct 2023 10:40    TPro  7.3  /  Alb  3.1<L>  /  TBili  0.4  /  DBili      /  AST  9<L>  /  ALT  11  /  AlkPhos  142<H>  10-16    Creatinine Trend: 12.18 <--, 10.90 <--                        10.3   8.28  )-----------( 118      ( 16 Oct 2023 10:40 )             33.0     Urine Studies:  Urinalysis Basic - ( 16 Oct 2023 10:40 )    Color:  / Appearance:  / SG:  / pH:   Gluc: 78 mg/dL / Ketone:   / Bili:  / Urobili:    Blood:  / Protein:  / Nitrite:    Leuk Esterase:  / RBC:  / WBC    Sq Epi:  / Non Sq Epi:  / Bacteria:         RADIOLOGY & ADDITIONAL STUDIES:

## 2023-10-16 NOTE — H&P ADULT - PROBLEM SELECTOR PLAN 1
P/w one day of L hip pain and inability to bear weight. No trauma. Plain films L hip and pelvis unremarkable.   CT pelvis: CT pelvis: No acute fracture or dislocation. Findings of bilateral sacroiliitis, similar in appearance as compared to prior CT abdomen and pelvis from 8/22/2023. Small left hip joint effusion of an uncertain etiology.     -Pain control: standing Tylenol, lidocaine patch, tramadol prn  -Bowel regimen while on opiates   -Small L hip effusion. No leukocytosis, no fevers, chills. F/u inflammatory markers. F/u ortho recs  -PT eval

## 2023-10-16 NOTE — H&P ADULT - ASSESSMENT
57 y/o M w/ PMHx of ESRD on HD MWF, HTN presenting with one day history of L hip/leg pain and inability to bear weight.

## 2023-10-16 NOTE — H&P ADULT - NSHPPHYSICALEXAM_GEN_ALL_CORE
Vital Signs Last 24 Hrs  T(C): 36.6 (16 Oct 2023 06:34), Max: 36.6 (16 Oct 2023 00:45)  T(F): 97.8 (16 Oct 2023 06:34), Max: 97.8 (16 Oct 2023 00:45)  HR: 87 (16 Oct 2023 06:34) (75 - 87)  BP: 181/92 (16 Oct 2023 06:34) (161/91 - 189/90)  BP(mean): 122 (16 Oct 2023 06:34) (122 - 122)  RR: 16 (16 Oct 2023 06:34) (16 - 17)  SpO2: 97% (16 Oct 2023 06:34) (97% - 99%)    Parameters below as of 16 Oct 2023 06:34  Patient On (Oxygen Delivery Method): room air        CONSTITUTIONAL: Well-groomed, in no apparent distress  EYES: No conjunctival or scleral injection, non-icteric; EOMI   ENMT: No external nasal lesions; poor dentition; no pharyngeal injection or exudates, oral mucosa with moist membranes  NECK: Supple; Trachea midline   RESPIRATORY: Breathing comfortably; lungs CTA without wheeze  CARDIOVASCULAR: +S1S2, RRR; no lower extremity edema  GASTROINTESTINAL: No tenderness, +BS throughout, no rebound/guarding  MUSCULOSKELETAL: LLE ROM limited by pain. 5/5 B/l UE, RLE.   NEUROLOGIC: grossly nonfocal; sensation intact in LEs b/l to light touch  PSYCHIATRIC: A+O x 3; mood and affect appropriate

## 2023-10-17 ENCOUNTER — APPOINTMENT (OUTPATIENT)
Dept: CARDIOLOGY | Facility: CLINIC | Age: 58
End: 2023-10-17

## 2023-10-17 DIAGNOSIS — Z01.818 ENCOUNTER FOR OTHER PREPROCEDURAL EXAMINATION: ICD-10-CM

## 2023-10-17 LAB
ALBUMIN SERPL ELPH-MCNC: 3 G/DL — LOW (ref 3.3–5)
ALBUMIN SERPL ELPH-MCNC: 3 G/DL — LOW (ref 3.3–5)
ALP SERPL-CCNC: 139 U/L — HIGH (ref 40–120)
ALP SERPL-CCNC: 139 U/L — HIGH (ref 40–120)
ALT FLD-CCNC: 11 U/L — SIGNIFICANT CHANGE UP (ref 10–45)
ALT FLD-CCNC: 11 U/L — SIGNIFICANT CHANGE UP (ref 10–45)
ANION GAP SERPL CALC-SCNC: 16 MMOL/L — SIGNIFICANT CHANGE UP (ref 5–17)
ANION GAP SERPL CALC-SCNC: 16 MMOL/L — SIGNIFICANT CHANGE UP (ref 5–17)
APTT BLD: 33.3 SEC — SIGNIFICANT CHANGE UP (ref 24.5–35.6)
APTT BLD: 33.3 SEC — SIGNIFICANT CHANGE UP (ref 24.5–35.6)
AST SERPL-CCNC: 10 U/L — SIGNIFICANT CHANGE UP (ref 10–40)
AST SERPL-CCNC: 10 U/L — SIGNIFICANT CHANGE UP (ref 10–40)
B PERT IGG+IGM PNL SER: ABNORMAL
B PERT IGG+IGM PNL SER: ABNORMAL
BASOPHILS # BLD AUTO: 0 K/UL — SIGNIFICANT CHANGE UP (ref 0–0.2)
BASOPHILS # BLD AUTO: 0 K/UL — SIGNIFICANT CHANGE UP (ref 0–0.2)
BASOPHILS NFR BLD AUTO: 0 % — SIGNIFICANT CHANGE UP (ref 0–2)
BASOPHILS NFR BLD AUTO: 0 % — SIGNIFICANT CHANGE UP (ref 0–2)
BILIRUB SERPL-MCNC: 0.4 MG/DL — SIGNIFICANT CHANGE UP (ref 0.2–1.2)
BILIRUB SERPL-MCNC: 0.4 MG/DL — SIGNIFICANT CHANGE UP (ref 0.2–1.2)
BLD GP AB SCN SERPL QL: NEGATIVE — SIGNIFICANT CHANGE UP
BLD GP AB SCN SERPL QL: NEGATIVE — SIGNIFICANT CHANGE UP
BUN SERPL-MCNC: 42 MG/DL — HIGH (ref 7–23)
BUN SERPL-MCNC: 42 MG/DL — HIGH (ref 7–23)
CALCIUM SERPL-MCNC: 9.2 MG/DL — SIGNIFICANT CHANGE UP (ref 8.4–10.5)
CALCIUM SERPL-MCNC: 9.2 MG/DL — SIGNIFICANT CHANGE UP (ref 8.4–10.5)
CHLORIDE SERPL-SCNC: 94 MMOL/L — LOW (ref 96–108)
CHLORIDE SERPL-SCNC: 94 MMOL/L — LOW (ref 96–108)
CO2 SERPL-SCNC: 25 MMOL/L — SIGNIFICANT CHANGE UP (ref 22–31)
CO2 SERPL-SCNC: 25 MMOL/L — SIGNIFICANT CHANGE UP (ref 22–31)
COLOR FLD: ABNORMAL
COLOR FLD: ABNORMAL
CREAT SERPL-MCNC: 8.58 MG/DL — HIGH (ref 0.5–1.3)
CREAT SERPL-MCNC: 8.58 MG/DL — HIGH (ref 0.5–1.3)
EGFR: 7 ML/MIN/1.73M2 — LOW
EGFR: 7 ML/MIN/1.73M2 — LOW
EOSINOPHIL # BLD AUTO: 0.17 K/UL — SIGNIFICANT CHANGE UP (ref 0–0.5)
EOSINOPHIL # BLD AUTO: 0.17 K/UL — SIGNIFICANT CHANGE UP (ref 0–0.5)
EOSINOPHIL # FLD: 20 % — SIGNIFICANT CHANGE UP
EOSINOPHIL # FLD: 20 % — SIGNIFICANT CHANGE UP
EOSINOPHIL NFR BLD AUTO: 2.6 % — SIGNIFICANT CHANGE UP (ref 0–6)
EOSINOPHIL NFR BLD AUTO: 2.6 % — SIGNIFICANT CHANGE UP (ref 0–6)
FLUID INTAKE SUBSTANCE CLASS: SIGNIFICANT CHANGE UP
FLUID INTAKE SUBSTANCE CLASS: SIGNIFICANT CHANGE UP
GLUCOSE FLD-MCNC: <6 MG/DL — SIGNIFICANT CHANGE UP
GLUCOSE FLD-MCNC: <6 MG/DL — SIGNIFICANT CHANGE UP
GLUCOSE SERPL-MCNC: 68 MG/DL — LOW (ref 70–99)
GLUCOSE SERPL-MCNC: 68 MG/DL — LOW (ref 70–99)
GRAM STN FLD: SIGNIFICANT CHANGE UP
GRAM STN FLD: SIGNIFICANT CHANGE UP
HCT VFR BLD CALC: 33.9 % — LOW (ref 39–50)
HCT VFR BLD CALC: 33.9 % — LOW (ref 39–50)
HGB BLD-MCNC: 10.5 G/DL — LOW (ref 13–17)
HGB BLD-MCNC: 10.5 G/DL — LOW (ref 13–17)
INR BLD: 1.23 RATIO — HIGH (ref 0.85–1.18)
INR BLD: 1.23 RATIO — HIGH (ref 0.85–1.18)
LYMPHOCYTES # BLD AUTO: 1.18 K/UL — SIGNIFICANT CHANGE UP (ref 1–3.3)
LYMPHOCYTES # BLD AUTO: 1.18 K/UL — SIGNIFICANT CHANGE UP (ref 1–3.3)
LYMPHOCYTES # BLD AUTO: 17.6 % — SIGNIFICANT CHANGE UP (ref 13–44)
LYMPHOCYTES # BLD AUTO: 17.6 % — SIGNIFICANT CHANGE UP (ref 13–44)
LYMPHOCYTES # FLD: 60 % — SIGNIFICANT CHANGE UP
LYMPHOCYTES # FLD: 60 % — SIGNIFICANT CHANGE UP
MANUAL SMEAR VERIFICATION: SIGNIFICANT CHANGE UP
MANUAL SMEAR VERIFICATION: SIGNIFICANT CHANGE UP
MCHC RBC-ENTMCNC: 27.6 PG — SIGNIFICANT CHANGE UP (ref 27–34)
MCHC RBC-ENTMCNC: 27.6 PG — SIGNIFICANT CHANGE UP (ref 27–34)
MCHC RBC-ENTMCNC: 31 GM/DL — LOW (ref 32–36)
MCHC RBC-ENTMCNC: 31 GM/DL — LOW (ref 32–36)
MCV RBC AUTO: 89.2 FL — SIGNIFICANT CHANGE UP (ref 80–100)
MCV RBC AUTO: 89.2 FL — SIGNIFICANT CHANGE UP (ref 80–100)
MONOCYTES # BLD AUTO: 0.82 K/UL — SIGNIFICANT CHANGE UP (ref 0–0.9)
MONOCYTES # BLD AUTO: 0.82 K/UL — SIGNIFICANT CHANGE UP (ref 0–0.9)
MONOCYTES NFR BLD AUTO: 12.3 % — SIGNIFICANT CHANGE UP (ref 2–14)
MONOCYTES NFR BLD AUTO: 12.3 % — SIGNIFICANT CHANGE UP (ref 2–14)
NEUTROPHILS # BLD AUTO: 4.52 K/UL — SIGNIFICANT CHANGE UP (ref 1.8–7.4)
NEUTROPHILS # BLD AUTO: 4.52 K/UL — SIGNIFICANT CHANGE UP (ref 1.8–7.4)
NEUTROPHILS NFR BLD AUTO: 67.5 % — SIGNIFICANT CHANGE UP (ref 43–77)
NEUTROPHILS NFR BLD AUTO: 67.5 % — SIGNIFICANT CHANGE UP (ref 43–77)
NEUTROPHILS-BODY FLUID: 20 % — SIGNIFICANT CHANGE UP
NEUTROPHILS-BODY FLUID: 20 % — SIGNIFICANT CHANGE UP
NEUTS HYPERSEG # BLD: PRESENT — SIGNIFICANT CHANGE UP
NEUTS HYPERSEG # BLD: PRESENT — SIGNIFICANT CHANGE UP
PLAT MORPH BLD: NORMAL — SIGNIFICANT CHANGE UP
PLAT MORPH BLD: NORMAL — SIGNIFICANT CHANGE UP
PLATELET # BLD AUTO: 127 K/UL — LOW (ref 150–400)
PLATELET # BLD AUTO: 127 K/UL — LOW (ref 150–400)
POTASSIUM SERPL-MCNC: 4.5 MMOL/L — SIGNIFICANT CHANGE UP (ref 3.5–5.3)
POTASSIUM SERPL-MCNC: 4.5 MMOL/L — SIGNIFICANT CHANGE UP (ref 3.5–5.3)
POTASSIUM SERPL-SCNC: 4.5 MMOL/L — SIGNIFICANT CHANGE UP (ref 3.5–5.3)
POTASSIUM SERPL-SCNC: 4.5 MMOL/L — SIGNIFICANT CHANGE UP (ref 3.5–5.3)
PROT FLD-MCNC: <0.6 G/DL — SIGNIFICANT CHANGE UP
PROT FLD-MCNC: <0.6 G/DL — SIGNIFICANT CHANGE UP
PROT SERPL-MCNC: 7.4 G/DL — SIGNIFICANT CHANGE UP (ref 6–8.3)
PROT SERPL-MCNC: 7.4 G/DL — SIGNIFICANT CHANGE UP (ref 6–8.3)
PROTHROM AB SERPL-ACNC: 12.8 SEC — SIGNIFICANT CHANGE UP (ref 9.5–13)
PROTHROM AB SERPL-ACNC: 12.8 SEC — SIGNIFICANT CHANGE UP (ref 9.5–13)
RBC # BLD: 3.8 M/UL — LOW (ref 4.2–5.8)
RBC # BLD: 3.8 M/UL — LOW (ref 4.2–5.8)
RBC # FLD: 18.3 % — HIGH (ref 10.3–14.5)
RBC # FLD: 18.3 % — HIGH (ref 10.3–14.5)
RBC BLD AUTO: SIGNIFICANT CHANGE UP
RBC BLD AUTO: SIGNIFICANT CHANGE UP
RCV VOL RI: 3550 CELLS/UL — HIGH (ref 0–5)
RCV VOL RI: 3550 CELLS/UL — HIGH (ref 0–5)
RH IG SCN BLD-IMP: POSITIVE — SIGNIFICANT CHANGE UP
RH IG SCN BLD-IMP: POSITIVE — SIGNIFICANT CHANGE UP
SODIUM SERPL-SCNC: 135 MMOL/L — SIGNIFICANT CHANGE UP (ref 135–145)
SODIUM SERPL-SCNC: 135 MMOL/L — SIGNIFICANT CHANGE UP (ref 135–145)
SPECIMEN SOURCE: SIGNIFICANT CHANGE UP
SPECIMEN SOURCE: SIGNIFICANT CHANGE UP
TOTAL NUCLEATED CELL COUNT, BODY FLUID: < 3 CELLS/UL — SIGNIFICANT CHANGE UP (ref 0–5)
TOTAL NUCLEATED CELL COUNT, BODY FLUID: < 3 CELLS/UL — SIGNIFICANT CHANGE UP (ref 0–5)
TUBE TYPE: SIGNIFICANT CHANGE UP
TUBE TYPE: SIGNIFICANT CHANGE UP
WBC # BLD: 6.7 K/UL — SIGNIFICANT CHANGE UP (ref 3.8–10.5)
WBC # BLD: 6.7 K/UL — SIGNIFICANT CHANGE UP (ref 3.8–10.5)
WBC # FLD AUTO: 6.7 K/UL — SIGNIFICANT CHANGE UP (ref 3.8–10.5)
WBC # FLD AUTO: 6.7 K/UL — SIGNIFICANT CHANGE UP (ref 3.8–10.5)

## 2023-10-17 PROCEDURE — 20611 DRAIN/INJ JOINT/BURSA W/US: CPT | Mod: LT

## 2023-10-17 PROCEDURE — 99231 SBSQ HOSP IP/OBS SF/LOW 25: CPT | Mod: GC

## 2023-10-17 PROCEDURE — 99233 SBSQ HOSP IP/OBS HIGH 50: CPT

## 2023-10-17 RX ORDER — HYDROMORPHONE HYDROCHLORIDE 2 MG/ML
0.5 INJECTION INTRAMUSCULAR; INTRAVENOUS; SUBCUTANEOUS ONCE
Refills: 0 | Status: DISCONTINUED | OUTPATIENT
Start: 2023-10-17 | End: 2023-10-17

## 2023-10-17 RX ORDER — HYDROMORPHONE HYDROCHLORIDE 2 MG/ML
0.5 INJECTION INTRAMUSCULAR; INTRAVENOUS; SUBCUTANEOUS EVERY 6 HOURS
Refills: 0 | Status: DISCONTINUED | OUTPATIENT
Start: 2023-10-17 | End: 2023-10-18

## 2023-10-17 RX ADMIN — Medication 650 MILLIGRAM(S): at 05:17

## 2023-10-17 RX ADMIN — CINACALCET 30 MILLIGRAM(S): 30 TABLET, FILM COATED ORAL at 13:19

## 2023-10-17 RX ADMIN — LIDOCAINE 1 PATCH: 4 CREAM TOPICAL at 00:28

## 2023-10-17 RX ADMIN — LIDOCAINE 1 PATCH: 4 CREAM TOPICAL at 19:00

## 2023-10-17 RX ADMIN — GABAPENTIN 300 MILLIGRAM(S): 400 CAPSULE ORAL at 13:20

## 2023-10-17 RX ADMIN — AMLODIPINE BESYLATE 10 MILLIGRAM(S): 2.5 TABLET ORAL at 05:17

## 2023-10-17 RX ADMIN — Medication 650 MILLIGRAM(S): at 00:30

## 2023-10-17 RX ADMIN — LIDOCAINE 1 PATCH: 4 CREAM TOPICAL at 13:00

## 2023-10-17 RX ADMIN — HYDROMORPHONE HYDROCHLORIDE 0.5 MILLIGRAM(S): 2 INJECTION INTRAMUSCULAR; INTRAVENOUS; SUBCUTANEOUS at 09:21

## 2023-10-17 RX ADMIN — Medication 300 MILLIGRAM(S): at 21:50

## 2023-10-17 RX ADMIN — LIDOCAINE 1 PATCH: 4 CREAM TOPICAL at 07:21

## 2023-10-17 RX ADMIN — Medication 1 PATCH: at 13:22

## 2023-10-17 RX ADMIN — Medication 1 PATCH: at 13:20

## 2023-10-17 RX ADMIN — Medication 100 MILLIGRAM(S): at 05:17

## 2023-10-17 RX ADMIN — HYDROMORPHONE HYDROCHLORIDE 0.5 MILLIGRAM(S): 2 INJECTION INTRAMUSCULAR; INTRAVENOUS; SUBCUTANEOUS at 14:38

## 2023-10-17 RX ADMIN — HYDROMORPHONE HYDROCHLORIDE 0.5 MILLIGRAM(S): 2 INJECTION INTRAMUSCULAR; INTRAVENOUS; SUBCUTANEOUS at 21:51

## 2023-10-17 RX ADMIN — HYDROMORPHONE HYDROCHLORIDE 0.5 MILLIGRAM(S): 2 INJECTION INTRAMUSCULAR; INTRAVENOUS; SUBCUTANEOUS at 22:21

## 2023-10-17 RX ADMIN — HYDROMORPHONE HYDROCHLORIDE 0.5 MILLIGRAM(S): 2 INJECTION INTRAMUSCULAR; INTRAVENOUS; SUBCUTANEOUS at 14:18

## 2023-10-17 RX ADMIN — Medication 650 MILLIGRAM(S): at 05:45

## 2023-10-17 RX ADMIN — TRAMADOL HYDROCHLORIDE 25 MILLIGRAM(S): 50 TABLET ORAL at 06:19

## 2023-10-17 RX ADMIN — Medication 100 MILLIGRAM(S): at 13:20

## 2023-10-17 RX ADMIN — LIDOCAINE 1 PATCH: 4 CREAM TOPICAL at 13:21

## 2023-10-17 RX ADMIN — Medication 1 PATCH: at 19:00

## 2023-10-17 RX ADMIN — Medication 1 PATCH: at 13:23

## 2023-10-17 RX ADMIN — LISINOPRIL 40 MILLIGRAM(S): 2.5 TABLET ORAL at 05:17

## 2023-10-17 RX ADMIN — Medication 300 MILLIGRAM(S): at 05:16

## 2023-10-17 RX ADMIN — Medication 300 MILLIGRAM(S): at 13:20

## 2023-10-17 RX ADMIN — Medication 100 MILLIGRAM(S): at 21:51

## 2023-10-17 RX ADMIN — Medication 650 MILLIGRAM(S): at 00:00

## 2023-10-17 NOTE — PROGRESS NOTE ADULT - PROBLEM SELECTOR PLAN 1
P/w one day of L hip pain and inability to bear weight. No trauma. Plain films L hip and pelvis unremarkable.   CT pelvis: CT pelvis: No acute fracture or dislocation. Findings of bilateral sacroiliitis, similar in appearance as compared to prior CT abdomen and pelvis from 8/22/2023. Small left hip joint effusion of an uncertain etiology.     -Pain control: standing Tylenol, lidocaine patch, tramadol prn  -Bowel regimen while on opiates   -Small L hip effusion. No leukocytosis, no fevers, chills. F/u inflammatory markers. F/u ortho recs  -PT eval  - s/p IR guided arthrocentesis, results pending

## 2023-10-17 NOTE — PRE PROCEDURE NOTE - PRE PROCEDURE EVALUATION
Interventional Radiology Pre-Procedure Note    This is a 58y Male with concern for left hip infection. Left hip aspiration is requested.     PAST MEDICAL & SURGICAL HISTORY:  Benign Essential Hypertension      Hypertension      GERD (gastroesophageal reflux disease)      ESRD on hemodialysis      Hyperlipidemia      Vitamin D deficiency      Tobacco use      HTN (hypertension)      HLD (hyperlipidemia)      History of open reduction and internal fixation (ORIF) procedure  Left Leg  1990  Hardware subsequently removed. Year unknown      AV fistula  Left upper arm 2/2006      No significant past surgical history           Vitals:Vital Signs Last 24 Hrs  T(C): 37.2 (17 Oct 2023 04:25), Max: 37.3 (16 Oct 2023 19:35)  T(F): 98.9 (17 Oct 2023 04:25), Max: 99.1 (16 Oct 2023 19:35)  HR: 81 (17 Oct 2023 04:25) (59 - 93)  BP: 164/84 (17 Oct 2023 04:25) (160/84 - 176/96)  BP(mean): --  RR: 18 (17 Oct 2023 04:25) (18 - 20)  SpO2: 97% (17 Oct 2023 04:25) (96% - 98%)    Parameters below as of 17 Oct 2023 04:25  Patient On (Oxygen Delivery Method): room air        Allergies: Allergies    No Known Allergies    Intolerances        Physical Exam:     Labs:                         10.5   6.70  )-----------( 127      ( 17 Oct 2023 06:59 )             33.9     10-17    135  |  94<L>  |  42<H>  ----------------------------<  68<L>  4.5   |  25  |  8.58<H>    Ca    9.2      17 Oct 2023 07:05    TPro  7.4  /  Alb  3.0<L>  /  TBili  0.4  /  DBili  x   /  AST  10  /  ALT  11  /  AlkPhos  139<H>  10-17    PT/INR - ( 17 Oct 2023 07:00 )   PT: 12.8 sec;   INR: 1.23 ratio         PTT - ( 17 Oct 2023 07:00 )  PTT:33.3 sec    Procedure and risks discussed with patient and he is agreeable to proceed.     Informed consent obtained. All questions and concerns have been addressed at this time.

## 2023-10-18 LAB
ALBUMIN SERPL ELPH-MCNC: 3.3 G/DL — SIGNIFICANT CHANGE UP (ref 3.3–5)
ALBUMIN SERPL ELPH-MCNC: 3.3 G/DL — SIGNIFICANT CHANGE UP (ref 3.3–5)
ALP SERPL-CCNC: 130 U/L — HIGH (ref 40–120)
ALP SERPL-CCNC: 130 U/L — HIGH (ref 40–120)
ALT FLD-CCNC: 7 U/L — LOW (ref 10–45)
ALT FLD-CCNC: 7 U/L — LOW (ref 10–45)
ANION GAP SERPL CALC-SCNC: 19 MMOL/L — HIGH (ref 5–17)
ANION GAP SERPL CALC-SCNC: 19 MMOL/L — HIGH (ref 5–17)
AST SERPL-CCNC: 8 U/L — LOW (ref 10–40)
AST SERPL-CCNC: 8 U/L — LOW (ref 10–40)
BILIRUB SERPL-MCNC: 0.4 MG/DL — SIGNIFICANT CHANGE UP (ref 0.2–1.2)
BILIRUB SERPL-MCNC: 0.4 MG/DL — SIGNIFICANT CHANGE UP (ref 0.2–1.2)
BUN SERPL-MCNC: 64 MG/DL — HIGH (ref 7–23)
BUN SERPL-MCNC: 64 MG/DL — HIGH (ref 7–23)
CALCIUM SERPL-MCNC: 9 MG/DL — SIGNIFICANT CHANGE UP (ref 8.4–10.5)
CALCIUM SERPL-MCNC: 9 MG/DL — SIGNIFICANT CHANGE UP (ref 8.4–10.5)
CHLORIDE SERPL-SCNC: 91 MMOL/L — LOW (ref 96–108)
CHLORIDE SERPL-SCNC: 91 MMOL/L — LOW (ref 96–108)
CO2 SERPL-SCNC: 22 MMOL/L — SIGNIFICANT CHANGE UP (ref 22–31)
CO2 SERPL-SCNC: 22 MMOL/L — SIGNIFICANT CHANGE UP (ref 22–31)
CREAT SERPL-MCNC: 11.12 MG/DL — HIGH (ref 0.5–1.3)
CREAT SERPL-MCNC: 11.12 MG/DL — HIGH (ref 0.5–1.3)
EGFR: 5 ML/MIN/1.73M2 — LOW
EGFR: 5 ML/MIN/1.73M2 — LOW
GLUCOSE SERPL-MCNC: 71 MG/DL — SIGNIFICANT CHANGE UP (ref 70–99)
GLUCOSE SERPL-MCNC: 71 MG/DL — SIGNIFICANT CHANGE UP (ref 70–99)
HCT VFR BLD CALC: 30.4 % — LOW (ref 39–50)
HCT VFR BLD CALC: 30.4 % — LOW (ref 39–50)
HGB BLD-MCNC: 9.6 G/DL — LOW (ref 13–17)
HGB BLD-MCNC: 9.6 G/DL — LOW (ref 13–17)
MCHC RBC-ENTMCNC: 27.9 PG — SIGNIFICANT CHANGE UP (ref 27–34)
MCHC RBC-ENTMCNC: 27.9 PG — SIGNIFICANT CHANGE UP (ref 27–34)
MCHC RBC-ENTMCNC: 31.6 GM/DL — LOW (ref 32–36)
MCHC RBC-ENTMCNC: 31.6 GM/DL — LOW (ref 32–36)
MCV RBC AUTO: 88.4 FL — SIGNIFICANT CHANGE UP (ref 80–100)
MCV RBC AUTO: 88.4 FL — SIGNIFICANT CHANGE UP (ref 80–100)
NRBC # BLD: 0 /100 WBCS — SIGNIFICANT CHANGE UP (ref 0–0)
NRBC # BLD: 0 /100 WBCS — SIGNIFICANT CHANGE UP (ref 0–0)
PLATELET # BLD AUTO: 126 K/UL — LOW (ref 150–400)
PLATELET # BLD AUTO: 126 K/UL — LOW (ref 150–400)
POTASSIUM SERPL-MCNC: 4.8 MMOL/L — SIGNIFICANT CHANGE UP (ref 3.5–5.3)
POTASSIUM SERPL-MCNC: 4.8 MMOL/L — SIGNIFICANT CHANGE UP (ref 3.5–5.3)
POTASSIUM SERPL-SCNC: 4.8 MMOL/L — SIGNIFICANT CHANGE UP (ref 3.5–5.3)
POTASSIUM SERPL-SCNC: 4.8 MMOL/L — SIGNIFICANT CHANGE UP (ref 3.5–5.3)
PROT SERPL-MCNC: 7.5 G/DL — SIGNIFICANT CHANGE UP (ref 6–8.3)
PROT SERPL-MCNC: 7.5 G/DL — SIGNIFICANT CHANGE UP (ref 6–8.3)
RBC # BLD: 3.44 M/UL — LOW (ref 4.2–5.8)
RBC # BLD: 3.44 M/UL — LOW (ref 4.2–5.8)
RBC # FLD: 18.3 % — HIGH (ref 10.3–14.5)
RBC # FLD: 18.3 % — HIGH (ref 10.3–14.5)
SODIUM SERPL-SCNC: 132 MMOL/L — LOW (ref 135–145)
SODIUM SERPL-SCNC: 132 MMOL/L — LOW (ref 135–145)
WBC # BLD: 6.26 K/UL — SIGNIFICANT CHANGE UP (ref 3.8–10.5)
WBC # BLD: 6.26 K/UL — SIGNIFICANT CHANGE UP (ref 3.8–10.5)
WBC # FLD AUTO: 6.26 K/UL — SIGNIFICANT CHANGE UP (ref 3.8–10.5)
WBC # FLD AUTO: 6.26 K/UL — SIGNIFICANT CHANGE UP (ref 3.8–10.5)

## 2023-10-18 PROCEDURE — 99233 SBSQ HOSP IP/OBS HIGH 50: CPT

## 2023-10-18 RX ORDER — HYDROMORPHONE HYDROCHLORIDE 2 MG/ML
0.5 INJECTION INTRAMUSCULAR; INTRAVENOUS; SUBCUTANEOUS EVERY 6 HOURS
Refills: 0 | Status: DISCONTINUED | OUTPATIENT
Start: 2023-10-18 | End: 2023-10-19

## 2023-10-18 RX ORDER — HEPARIN SODIUM 5000 [USP'U]/ML
5000 INJECTION INTRAVENOUS; SUBCUTANEOUS EVERY 8 HOURS
Refills: 0 | Status: DISCONTINUED | OUTPATIENT
Start: 2023-10-18 | End: 2023-10-19

## 2023-10-18 RX ORDER — ERYTHROPOIETIN 10000 [IU]/ML
10000 INJECTION, SOLUTION INTRAVENOUS; SUBCUTANEOUS
Refills: 0 | Status: DISCONTINUED | OUTPATIENT
Start: 2023-10-18 | End: 2023-10-19

## 2023-10-18 RX ORDER — TRAMADOL HYDROCHLORIDE 50 MG/1
25 TABLET ORAL EVERY 4 HOURS
Refills: 0 | Status: DISCONTINUED | OUTPATIENT
Start: 2023-10-18 | End: 2023-10-19

## 2023-10-18 RX ADMIN — AMLODIPINE BESYLATE 10 MILLIGRAM(S): 2.5 TABLET ORAL at 05:18

## 2023-10-18 RX ADMIN — LIDOCAINE 1 PATCH: 4 CREAM TOPICAL at 20:32

## 2023-10-18 RX ADMIN — CINACALCET 30 MILLIGRAM(S): 30 TABLET, FILM COATED ORAL at 13:35

## 2023-10-18 RX ADMIN — Medication 300 MILLIGRAM(S): at 21:52

## 2023-10-18 RX ADMIN — Medication 650 MILLIGRAM(S): at 22:21

## 2023-10-18 RX ADMIN — Medication 650 MILLIGRAM(S): at 22:20

## 2023-10-18 RX ADMIN — Medication 1 PATCH: at 13:34

## 2023-10-18 RX ADMIN — Medication 300 MILLIGRAM(S): at 13:36

## 2023-10-18 RX ADMIN — HYDROMORPHONE HYDROCHLORIDE 0.5 MILLIGRAM(S): 2 INJECTION INTRAMUSCULAR; INTRAVENOUS; SUBCUTANEOUS at 14:05

## 2023-10-18 RX ADMIN — TRAMADOL HYDROCHLORIDE 25 MILLIGRAM(S): 50 TABLET ORAL at 22:20

## 2023-10-18 RX ADMIN — HYDROMORPHONE HYDROCHLORIDE 0.5 MILLIGRAM(S): 2 INJECTION INTRAMUSCULAR; INTRAVENOUS; SUBCUTANEOUS at 05:10

## 2023-10-18 RX ADMIN — Medication 300 MILLIGRAM(S): at 05:17

## 2023-10-18 RX ADMIN — Medication 100 MILLIGRAM(S): at 13:36

## 2023-10-18 RX ADMIN — Medication 1000 MILLIGRAM(S): at 22:21

## 2023-10-18 RX ADMIN — Medication 100 MILLIGRAM(S): at 21:52

## 2023-10-18 RX ADMIN — LISINOPRIL 40 MILLIGRAM(S): 2.5 TABLET ORAL at 05:17

## 2023-10-18 RX ADMIN — LIDOCAINE 1 PATCH: 4 CREAM TOPICAL at 13:34

## 2023-10-18 RX ADMIN — GABAPENTIN 300 MILLIGRAM(S): 400 CAPSULE ORAL at 13:35

## 2023-10-18 RX ADMIN — LIDOCAINE 1 PATCH: 4 CREAM TOPICAL at 01:00

## 2023-10-18 RX ADMIN — HYDROMORPHONE HYDROCHLORIDE 0.5 MILLIGRAM(S): 2 INJECTION INTRAMUSCULAR; INTRAVENOUS; SUBCUTANEOUS at 22:30

## 2023-10-18 RX ADMIN — Medication 1 PATCH: at 20:32

## 2023-10-18 RX ADMIN — Medication 650 MILLIGRAM(S): at 05:17

## 2023-10-18 RX ADMIN — HYDROMORPHONE HYDROCHLORIDE 0.5 MILLIGRAM(S): 2 INJECTION INTRAMUSCULAR; INTRAVENOUS; SUBCUTANEOUS at 21:52

## 2023-10-18 RX ADMIN — Medication 1 PATCH: at 07:44

## 2023-10-18 RX ADMIN — HYDROMORPHONE HYDROCHLORIDE 0.5 MILLIGRAM(S): 2 INJECTION INTRAMUSCULAR; INTRAVENOUS; SUBCUTANEOUS at 04:40

## 2023-10-18 RX ADMIN — HYDROMORPHONE HYDROCHLORIDE 0.5 MILLIGRAM(S): 2 INJECTION INTRAMUSCULAR; INTRAVENOUS; SUBCUTANEOUS at 13:35

## 2023-10-18 RX ADMIN — Medication 1 PATCH: at 14:09

## 2023-10-18 RX ADMIN — Medication 100 MILLIGRAM(S): at 05:18

## 2023-10-18 NOTE — PROGRESS NOTE ADULT - TIME BILLING
- Ordering, reviewing, and interpreting labs, testing, and imaging.  - Independently obtaining a review of systems and performing a physical exam  - Counselling and educating patient regarding interpretation of aforementioned items and plan of care.
- Ordering, reviewing, and interpreting labs, testing, and imaging.  - Independently obtaining a review of systems and performing a physical exam  - Reviewing consultant documentation/recommendations  - Counselling and educating patient regarding interpretation of aforementioned items and plan of care.

## 2023-10-18 NOTE — PHYSICAL THERAPY INITIAL EVALUATION ADULT - PERTINENT HX OF CURRENT PROBLEM, REHAB EVAL
57 y/o M w/ PMHx of ESRD on HD MWF, HTN presenting with one day history of L hip/leg pain. Patient reports he was in usual state of health until morning PTA when he woke up with severe L hip and thigh pain. Denies any trauma to the area or falls. Movement worsens pain. No obvious alleviating factors. Says at baseline no assistive devices needed for ambulation, however now since the pain has started, he is unable to ambulate due to severe pain. Admits to prior surgery of L hip/leg but unable to elaborate on what procedure. He otherwise denies any fevers, chills, CP, SOB, abd pain, n/v, diarrhea, dysuria.  CT pelvis:No acute fracture or dislocation, Findings of bilateral sacroiliitis, similar in appearance as compared to prior CT abdomen and pelvis from 8/22/2023,  Small left hip joint effusion of an uncertain etiology. Xray L hip: (-)fx, xray pelvis: (-)fx, xray L femur: (-)fx

## 2023-10-18 NOTE — PROGRESS NOTE ADULT - PROBLEM SELECTOR PLAN 1
P/w one day of L hip pain and inability to bear weight. No trauma. Plain films L hip and pelvis unremarkable.   CT pelvis: CT pelvis: No acute fracture or dislocation. Findings of bilateral sacroiliitis, similar in appearance as compared to prior CT abdomen and pelvis from 8/22/2023. Small left hip joint effusion of an uncertain etiology.     -Pain control: standing Tylenol, lidocaine patch, tramadol prn, dilaudid prn  -Bowel regimen while on opiates   -Small L hip effusion s/p IR guided arthrocentesis on 10/17  -negative for septic arthritis  -lab called to try to add on crystal analysis to joint fluid P/w one day of L hip pain and inability to bear weight. No trauma. Plain films L hip and pelvis unremarkable.   CT pelvis: CT pelvis: No acute fracture or dislocation. Findings of bilateral sacroiliitis, similar in appearance as compared to prior CT abdomen and pelvis from 8/22/2023. Small left hip joint effusion of an uncertain etiology.     -Pain control: standing Tylenol, lidocaine patch, tramadol prn for severe pain  - IV dilaudid only for breakthrough pain  -Bowel regimen while on opiates   -Small L hip effusion s/p IR guided arthrocentesis on 10/17  -negative for septic arthritis  -lab called to try to add on crystal analysis to joint fluid

## 2023-10-18 NOTE — PHYSICAL THERAPY INITIAL EVALUATION ADULT - ADDITIONAL COMMENTS
pt states lives alone in private house, (+)stairs. pt independent with mobility, pt amb with straight cane pt states lives alone in private house, (+)6 steps with HR. pt independent with mobility, pt amb with straight cane, (+)Driving

## 2023-10-19 ENCOUNTER — TRANSCRIPTION ENCOUNTER (OUTPATIENT)
Age: 58
End: 2023-10-19

## 2023-10-19 VITALS
DIASTOLIC BLOOD PRESSURE: 76 MMHG | SYSTOLIC BLOOD PRESSURE: 123 MMHG | HEART RATE: 85 BPM | RESPIRATION RATE: 18 BRPM | OXYGEN SATURATION: 99 % | TEMPERATURE: 99 F

## 2023-10-19 PROCEDURE — 85025 COMPLETE CBC W/AUTO DIFF WBC: CPT

## 2023-10-19 PROCEDURE — 84132 ASSAY OF SERUM POTASSIUM: CPT

## 2023-10-19 PROCEDURE — 97110 THERAPEUTIC EXERCISES: CPT

## 2023-10-19 PROCEDURE — 82947 ASSAY GLUCOSE BLOOD QUANT: CPT

## 2023-10-19 PROCEDURE — 82435 ASSAY OF BLOOD CHLORIDE: CPT

## 2023-10-19 PROCEDURE — 85730 THROMBOPLASTIN TIME PARTIAL: CPT

## 2023-10-19 PROCEDURE — 97116 GAIT TRAINING THERAPY: CPT

## 2023-10-19 PROCEDURE — 84157 ASSAY OF PROTEIN OTHER: CPT

## 2023-10-19 PROCEDURE — 20611 DRAIN/INJ JOINT/BURSA W/US: CPT

## 2023-10-19 PROCEDURE — 89060 EXAM SYNOVIAL FLUID CRYSTALS: CPT

## 2023-10-19 PROCEDURE — 86901 BLOOD TYPING SEROLOGIC RH(D): CPT

## 2023-10-19 PROCEDURE — 86900 BLOOD TYPING SEROLOGIC ABO: CPT

## 2023-10-19 PROCEDURE — 36415 COLL VENOUS BLD VENIPUNCTURE: CPT

## 2023-10-19 PROCEDURE — C1729: CPT

## 2023-10-19 PROCEDURE — 72170 X-RAY EXAM OF PELVIS: CPT

## 2023-10-19 PROCEDURE — 82945 GLUCOSE OTHER FLUID: CPT

## 2023-10-19 PROCEDURE — 86850 RBC ANTIBODY SCREEN: CPT

## 2023-10-19 PROCEDURE — 73501 X-RAY EXAM HIP UNI 1 VIEW: CPT

## 2023-10-19 PROCEDURE — 87075 CULTR BACTERIA EXCEPT BLOOD: CPT

## 2023-10-19 PROCEDURE — 96374 THER/PROPH/DIAG INJ IV PUSH: CPT

## 2023-10-19 PROCEDURE — 99239 HOSP IP/OBS DSCHRG MGMT >30: CPT

## 2023-10-19 PROCEDURE — 86140 C-REACTIVE PROTEIN: CPT

## 2023-10-19 PROCEDURE — 73552 X-RAY EXAM OF FEMUR 2/>: CPT

## 2023-10-19 PROCEDURE — 85027 COMPLETE CBC AUTOMATED: CPT

## 2023-10-19 PROCEDURE — 87040 BLOOD CULTURE FOR BACTERIA: CPT

## 2023-10-19 PROCEDURE — 83605 ASSAY OF LACTIC ACID: CPT

## 2023-10-19 PROCEDURE — 85014 HEMATOCRIT: CPT

## 2023-10-19 PROCEDURE — 85610 PROTHROMBIN TIME: CPT

## 2023-10-19 PROCEDURE — 82330 ASSAY OF CALCIUM: CPT

## 2023-10-19 PROCEDURE — 80053 COMPREHEN METABOLIC PANEL: CPT

## 2023-10-19 PROCEDURE — 89051 BODY FLUID CELL COUNT: CPT

## 2023-10-19 PROCEDURE — 87070 CULTURE OTHR SPECIMN AEROBIC: CPT

## 2023-10-19 PROCEDURE — 76377 3D RENDER W/INTRP POSTPROCES: CPT

## 2023-10-19 PROCEDURE — 97162 PT EVAL MOD COMPLEX 30 MIN: CPT

## 2023-10-19 PROCEDURE — 85018 HEMOGLOBIN: CPT

## 2023-10-19 PROCEDURE — 72192 CT PELVIS W/O DYE: CPT | Mod: MA

## 2023-10-19 PROCEDURE — 99261: CPT

## 2023-10-19 PROCEDURE — 85652 RBC SED RATE AUTOMATED: CPT

## 2023-10-19 PROCEDURE — 84295 ASSAY OF SERUM SODIUM: CPT

## 2023-10-19 PROCEDURE — 87205 SMEAR GRAM STAIN: CPT

## 2023-10-19 PROCEDURE — 82803 BLOOD GASES ANY COMBINATION: CPT

## 2023-10-19 PROCEDURE — 99285 EMERGENCY DEPT VISIT HI MDM: CPT

## 2023-10-19 RX ORDER — LIDOCAINE 4 G/100G
1 CREAM TOPICAL
Qty: 7 | Refills: 0
Start: 2023-10-19 | End: 2023-10-25

## 2023-10-19 RX ORDER — ACETAMINOPHEN 500 MG
1 TABLET ORAL
Qty: 40 | Refills: 0
Start: 2023-10-19 | End: 2023-10-28

## 2023-10-19 RX ADMIN — Medication 300 MILLIGRAM(S): at 05:39

## 2023-10-19 RX ADMIN — LIDOCAINE 1 PATCH: 4 CREAM TOPICAL at 10:50

## 2023-10-19 RX ADMIN — TRAMADOL HYDROCHLORIDE 25 MILLIGRAM(S): 50 TABLET ORAL at 15:46

## 2023-10-19 RX ADMIN — Medication 1 PATCH: at 10:47

## 2023-10-19 RX ADMIN — Medication 100 MILLIGRAM(S): at 05:39

## 2023-10-19 RX ADMIN — HYDROMORPHONE HYDROCHLORIDE 0.5 MILLIGRAM(S): 2 INJECTION INTRAMUSCULAR; INTRAVENOUS; SUBCUTANEOUS at 11:22

## 2023-10-19 RX ADMIN — GABAPENTIN 300 MILLIGRAM(S): 400 CAPSULE ORAL at 10:49

## 2023-10-19 RX ADMIN — CINACALCET 30 MILLIGRAM(S): 30 TABLET, FILM COATED ORAL at 10:49

## 2023-10-19 RX ADMIN — TRAMADOL HYDROCHLORIDE 25 MILLIGRAM(S): 50 TABLET ORAL at 05:43

## 2023-10-19 RX ADMIN — AMLODIPINE BESYLATE 10 MILLIGRAM(S): 2.5 TABLET ORAL at 05:39

## 2023-10-19 RX ADMIN — Medication 1 PATCH: at 07:00

## 2023-10-19 RX ADMIN — LIDOCAINE 1 PATCH: 4 CREAM TOPICAL at 06:58

## 2023-10-19 RX ADMIN — TRAMADOL HYDROCHLORIDE 25 MILLIGRAM(S): 50 TABLET ORAL at 06:46

## 2023-10-19 RX ADMIN — HYDROMORPHONE HYDROCHLORIDE 0.5 MILLIGRAM(S): 2 INJECTION INTRAMUSCULAR; INTRAVENOUS; SUBCUTANEOUS at 10:49

## 2023-10-19 RX ADMIN — LISINOPRIL 40 MILLIGRAM(S): 2.5 TABLET ORAL at 05:39

## 2023-10-19 RX ADMIN — TRAMADOL HYDROCHLORIDE 25 MILLIGRAM(S): 50 TABLET ORAL at 16:18

## 2023-10-19 NOTE — DISCHARGE NOTE NURSING/CASE MANAGEMENT/SOCIAL WORK - PATIENT PORTAL LINK FT
You can access the FollowMyHealth Patient Portal offered by [facility] by registering at the following website: [email or web address]. By joining [employer]’s FollowMyHealth portal, you will also be able to view your health information using other applications (apps) compatible with our system.

## 2023-10-19 NOTE — DISCHARGE NOTE PROVIDER - NSDCMRMEDTOKEN_GEN_ALL_CORE_FT
amLODIPine 10 mg oral tablet: 1 tab(s) orally once a day Note:Last filled in July for 1 month supply  ergocalciferol 1.25 mg (50,000 intl units) oral capsule: 1 cap(s) orally once a week on Mondays  Note:Last filled in April for 3 months  gabapentin 300 mg oral capsule: 1 cap(s) orally once a day  hydrALAZINE 100 mg oral tablet: 1 tab(s) orally 3 times a day  labetalol 300 mg oral tablet: 1 tab(s) orally 3 times a day  lisinopril 40 mg oral tablet: 1 tab(s) orally once a day  Sensipar 30 mg oral tablet: 1 tab(s) orally once a day   acetaminophen 650 mg oral tablet, extended release: 1 tab(s) orally every 6 hours as needed for  moderate pain  amLODIPine 10 mg oral tablet: 1 tab(s) orally once a day Note:Last filled in July for 1 month supply  ergocalciferol 1.25 mg (50,000 intl units) oral capsule: 1 cap(s) orally once a week on Mondays  Note:Last filled in April for 3 months  gabapentin 300 mg oral capsule: 1 cap(s) orally once a day  hydrALAZINE 100 mg oral tablet: 1 tab(s) orally 3 times a day  labetalol 300 mg oral tablet: 1 tab(s) orally 3 times a day  lidocaine 4% topical film: Apply topically to affected area once a day Apply for 12 hours then remove for 12 hours  lisinopril 40 mg oral tablet: 1 tab(s) orally once a day  Sensipar 30 mg oral tablet: 1 tab(s) orally once a day

## 2023-10-19 NOTE — DISCHARGE NOTE PROVIDER - CARE PROVIDER_API CALL
Himanshu Aguiar  Cardiology  [address], [address]  [place] [address]  Phone: [phone]  Fax: [phone]  Follow Up Time: 1 week

## 2023-10-19 NOTE — DISCHARGE NOTE PROVIDER - NSDCHC_MEDRECSTATUS_GEN_ALL_CORE

## 2023-10-19 NOTE — PROGRESS NOTE ADULT - PROBLEM SELECTOR PLAN 1
P/w one day of L hip pain and inability to bear weight. No trauma. Plain films L hip and pelvis unremarkable.   CT pelvis: CT pelvis: No acute fracture or dislocation. Findings of bilateral sacroiliitis, similar in appearance as compared to prior CT abdomen and pelvis from 8/22/2023. Small left hip joint effusion of an uncertain etiology.     -Pain control: standing Tylenol, lidocaine patch, tramadol prn for severe pain  - IV dilaudid only for breakthrough pain  -Bowel regimen while on opiates   -Small L hip effusion s/p IR guided arthrocentesis on 10/17  -negative for septic arthritis  -lab called to try to add on crystal analysis to joint fluid -> unable to add on exam  - pain is now improved with supportive care

## 2023-10-19 NOTE — PROGRESS NOTE ADULT - ASSESSMENT
58 year old male with a PMHx of ESRD on HD MWF and HTN who presents with acute left leg pain.
59 y/o M w/ PMHx of ESRD on HD MWF, HTN presenting with one day history of L hip/leg pain and inability to bear weight. 
57 y/o M w/ PMHx of ESRD on HD MWF, HTN presenting with one day history of L hip/leg pain.      1) ESRD on HD-   HD center- [facility]  Access: left upper ext avf  Consent in chart  s/p HD yesterday- flowsheet reviewed- tolerated well  trend bmp      2) Anemia in CKD  epo 10k tiw with hd  trend hgb    Dr Corral  [phone]
57 y/o M w/ PMHx of ESRD on HD MWF, HTN presenting with one day history of L hip/leg pain.      1) ESRD on HD-   HD center- [facility]  Access: left upper ext avf  Consent in chart  s/p HD today- flowsheet reviewed  trend bmp      2) Anemia in CKD  epo 10k tiw with hd  trend hgb    Dr Corral  [phone]
59 y/o M w/ PMHx of ESRD on HD MWF, HTN presenting with one day history of L hip/leg pain.      1) ESRD on HD-   HD [place]- [facility]  Access: left upper ext avf  Consent in chart  s/p HD yesterday-- tolerated well  Plan for next HD tomm  trend bmp      2) Anemia in CKD  hgb at goal  trend hgb    Dr Corral  [phone]
58 year old male with a PMHx of ESRD on HD MWF and HTN who presents with acute left leg pain. Evaluation significant for sacroiliitis associated with small joint effusion. Arthrocentesis negative for septic joint. Pt now improved with supportive care. He is ready for discharge home.

## 2023-10-19 NOTE — PROGRESS NOTE ADULT - SUBJECTIVE AND OBJECTIVE BOX
Patient  seen and examined  no complaints    No Known Allergies    Hospital Medications:   MEDICATIONS  (STANDING):  acetaminophen     Tablet .. 650 milliGRAM(s) Oral every 6 hours  amLODIPine   Tablet 10 milliGRAM(s) Oral daily  cinacalcet 30 milliGRAM(s) Oral daily  gabapentin 300 milliGRAM(s) Oral daily  hydrALAZINE 100 milliGRAM(s) Oral three times a day  labetalol 300 milliGRAM(s) Oral three times a day  lidocaine   4% Patch 1 Patch Transdermal daily  lisinopril 40 milliGRAM(s) Oral daily  nicotine -  14 mG/24Hr(s) Patch 1 Patch Transdermal daily  polyethylene glycol 3350 17 Gram(s) Oral daily  senna 2 Tablet(s) Oral at bedtime    VITALS:  T(F): 98.8 (10-17-23 @ 11:00), Max: 99.1 (10-16-23 @ 19:35)  HR: 82 (10-17-23 @ 11:00)  BP: 144/75 (10-17-23 @ 11:00)  RR: 18 (10-17-23 @ 11:00)  SpO2: 97% (10-17-23 @ 11:00)  Wt(kg): --    10-16 @ 07:01  -  10-17 @ 07:00  --------------------------------------------------------  IN: 0 mL / OUT: 2500 mL / NET: -2500 mL        PHYSICAL EXAM:  Constitutional: NAD  HEENT: anicteric sclera, oropharynx clear, MMM  Neck: No JVD  Respiratory: CTAB, no wheezes, rales or rhonchi  Cardiovascular: S1, S2, RRR  Gastrointestinal: BS+, soft, NT/ND  Extremities: No cyanosis or clubbing. No peripheral edema  Neurological: A/O x 3, no focal deficits  Psychiatric: Normal mood, normal affect  : No CVA tenderness. No rubio.   Skin: No rashes  Vascular Access: left uper ext avf + thrill + aneursym    LABS:  10-17    135  |  94<L>  |  42<H>  ----------------------------<  68<L>  4.5   |  25  |  8.58<H>    Ca    9.2      17 Oct 2023 07:05    TPro  7.4  /  Alb  3.0<L>  /  TBili  0.4  /  DBili      /  AST  10  /  ALT  11  /  AlkPhos  139<H>  10-17    Creatinine Trend: 8.58 <--, 12.18 <--, 10.90 <--                        10.5   6.70  )-----------( 127      ( 17 Oct 2023 06:59 )             33.9     Urine Studies:  Urinalysis Basic - ( 17 Oct 2023 07:05 )    Color:  / Appearance:  / SG:  / pH:   Gluc: 68 mg/dL / Ketone:   / Bili:  / Urobili:    Blood:  / Protein:  / Nitrite:    Leuk Esterase:  / RBC:  / WBC    Sq Epi:  / Non Sq Epi:  / Bacteria:         RADIOLOGY & ADDITIONAL STUDIES:  
Pt seen and examined at bedside, resting comfortably. Pain is controlled. Endorses slight improvement in L Hip pain. Denies CP, SOB, fever, chills or any other complaints.     LABS:                        10.3   8.28  )-----------( 118      ( 16 Oct 2023 10:40 )             33.0     10-16    137  |  97  |  71<H>  ----------------------------<  78  4.8   |  21<L>  |  12.18<H>    Ca    9.0      16 Oct 2023 10:40    TPro  7.3  /  Alb  3.1<L>  /  TBili  0.4  /  DBili  x   /  AST  9<L>  /  ALT  11  /  AlkPhos  142<H>  10-16      Urinalysis Basic - ( 16 Oct 2023 10:40 )    Color: x / Appearance: x / SG: x / pH: x  Gluc: 78 mg/dL / Ketone: x  / Bili: x / Urobili: x   Blood: x / Protein: x / Nitrite: x   Leuk Esterase: x / RBC: x / WBC x   Sq Epi: x / Non Sq Epi: x / Bacteria: x        VITAL SIGNS:  T(C): 37.2 (10-17-23 @ 04:25), Max: 37.3 (10-16-23 @ 19:35)  HR: 81 (10-17-23 @ 04:25) (59 - 93)  BP: 164/84 (10-17-23 @ 04:25) (160/84 - 181/92)  RR: 18 (10-17-23 @ 04:25) (16 - 20)  SpO2: 97% (10-17-23 @ 04:25) (96% - 98%)    PE:   LLE:   Skin intact, no redness or warmth  TTP at hip diffusely, NTTP elsewhere along extremity  Unable to SLR secondary to pain  AROM limited secondary to pain  PROM 0-20 w/ terminal flexion pain  No pain w/ micromotion  + EHL/FHL/GS/TA  SILT Tib/SPN/DPN/Sural/Saph  2+ DP  Compartments soft and compressible  No Calf TTP    AP: 57y/o M w/ L Hip pain    NPO, IVF while NPO - Keep NPO until IR Hip Aspiration is done and resulted.   FU Preop labs  LLE WBAT  Pain control prn  DVT PPx: SCDs.   FU BCx  ESR/CRP: 120/60  FU L Hip IR aspiration to r/o septic joint. Please Send Cx/GS/CC/Crystals.   Medical management appreciated. PLEASE DOCUMENT MEDICAL CLEARANCE FOR POSSIBLE OR PENDING IR ASPIRATION TODAY.   Discussed plan w/ Dr. Morrell who agrees.     
  Patient seen and examined  no complaints    No Known Allergies    Hospital Medications:   MEDICATIONS  (STANDING):  amLODIPine   Tablet 10 milliGRAM(s) Oral daily  cinacalcet 30 milliGRAM(s) Oral daily  epoetin ye (EPOGEN) Injectable 81029 Unit(s) IV Push <User Schedule>  gabapentin 300 milliGRAM(s) Oral daily  heparin   Injectable 5000 Unit(s) SubCutaneous every 8 hours  hydrALAZINE 100 milliGRAM(s) Oral three times a day  labetalol 300 milliGRAM(s) Oral three times a day  lidocaine   4% Patch 1 Patch Transdermal daily  lisinopril 40 milliGRAM(s) Oral daily  nicotine -  14 mG/24Hr(s) Patch 1 Patch Transdermal daily  polyethylene glycol 3350 17 Gram(s) Oral daily  senna 2 Tablet(s) Oral at bedtime        VITALS:  T(F): 98.3 (10-19-23 @ 12:00), Max: 98.5 (10-18-23 @ 20:15)  HR: 76 (10-19-23 @ 12:00)  BP: 115/62 (10-19-23 @ 12:00)  RR: 18 (10-19-23 @ 12:00)  SpO2: 99% (10-19-23 @ 12:00)  Wt(kg): --    10-18 @ 07:01  -  10-19 @ 07:00  --------------------------------------------------------  IN: 0 mL / OUT: 2500 mL / NET: -2500 mL          PHYSICAL EXAM:  Constitutional: NAD  HEENT: anicteric sclera, oropharynx clear, MMM  Neck: No JVD  Respiratory: CTAB, no wheezes, rales or rhonchi  Cardiovascular: S1, S2, RRR  Gastrointestinal: BS+, soft, NT/ND  Extremities: No cyanosis or clubbing. No peripheral edema  Neurological: A/O x 3, no focal deficits  Psychiatric: Normal mood, normal affect  : No CVA tenderness. No rubio.   Skin: No rashes  Vascular Access: left uper ext avf + thrill + aneursym    LABS:  10-18    132<L>  |  91<L>  |  64<H>  ----------------------------<  71  4.8   |  22  |  11.12<H>    Ca    9.0      18 Oct 2023 07:10    TPro  7.5  /  Alb  3.3  /  TBili  0.4  /  DBili      /  AST  8<L>  /  ALT  7<L>  /  AlkPhos  130<H>  10-18    Creatinine Trend: 11.12 <--, 8.58 <--, 12.18 <--, 10.90 <--                        9.6    6.26  )-----------( 126      ( 18 Oct 2023 07:13 )             30.4     Urine Studies:  Urinalysis Basic - ( 18 Oct 2023 07:10 )    Color:  / Appearance:  / SG:  / pH:   Gluc: 71 mg/dL / Ketone:   / Bili:  / Urobili:    Blood:  / Protein:  / Nitrite:    Leuk Esterase:  / RBC:  / WBC    Sq Epi:  / Non Sq Epi:  / Bacteria:         RADIOLOGY & ADDITIONAL STUDIES:  
  Patient seen and examined  no complaints    No Known Allergies    Hospital Medications:   MEDICATIONS  (STANDING):  amLODIPine   Tablet 10 milliGRAM(s) Oral daily  cinacalcet 30 milliGRAM(s) Oral daily  gabapentin 300 milliGRAM(s) Oral daily  hydrALAZINE 100 milliGRAM(s) Oral three times a day  labetalol 300 milliGRAM(s) Oral three times a day  lidocaine   4% Patch 1 Patch Transdermal daily  lisinopril 40 milliGRAM(s) Oral daily  nicotine -  14 mG/24Hr(s) Patch 1 Patch Transdermal daily  polyethylene glycol 3350 17 Gram(s) Oral daily  senna 2 Tablet(s) Oral at bedtime        VITALS:  T(F): 97.2 (10-18-23 @ 12:10), Max: 98.3 (10-18-23 @ 04:28)  HR: 85 (10-18-23 @ 12:10)  BP: 128/72 (10-18-23 @ 12:10)  RR: 18 (10-18-23 @ 12:10)  SpO2: 98% (10-18-23 @ 12:10)  Wt(kg): --    10-18 @ 07:01  -  10-18 @ 13:59  --------------------------------------------------------  IN: 0 mL / OUT: 2500 mL / NET: -2500 mL    PHYSICAL EXAM:  Constitutional: NAD  HEENT: anicteric sclera, oropharynx clear, MMM  Neck: No JVD  Respiratory: CTAB, no wheezes, rales or rhonchi  Cardiovascular: S1, S2, RRR  Gastrointestinal: BS+, soft, NT/ND  Extremities: No cyanosis or clubbing. No peripheral edema  Neurological: A/O x 3, no focal deficits  Psychiatric: Normal mood, normal affect  : No CVA tenderness. No rubio.   Skin: No rashes  Vascular Access: left uper ext avf + thrill + aneursym    LABS:  10-18    132<L>  |  91<L>  |  64<H>  ----------------------------<  71  4.8   |  22  |  11.12<H>    Ca    9.0      18 Oct 2023 07:10    TPro  7.5  /  Alb  3.3  /  TBili  0.4  /  DBili      /  AST  8<L>  /  ALT  7<L>  /  AlkPhos  130<H>  10-18    Creatinine Trend: 11.12 <--, 8.58 <--, 12.18 <--, 10.90 <--                        9.6    6.26  )-----------( 126      ( 18 Oct 2023 07:13 )             30.4     Urine Studies:  Urinalysis Basic - ( 18 Oct 2023 07:10 )    Color:  / Appearance:  / SG:  / pH:   Gluc: 71 mg/dL / Ketone:   / Bili:  / Urobili:    Blood:  / Protein:  / Nitrite:    Leuk Esterase:  / RBC:  / WBC    Sq Epi:  / Non Sq Epi:  / Bacteria:         RADIOLOGY & ADDITIONAL STUDIES:  
Shweta Donis MD  Hospitalist  Available on MS Teams      PROGRESS NOTE:     Patient is a 58y old  Male who presents with a chief complaint of CC: L hip pain (18 Oct 2023 13:58)      SUBJECTIVE / OVERNIGHT EVENTS:  No acute events overnight.   Pt seen this afternoon, after dialysis. States pain is improved compared to initial presentation but still present. Responds well to dilaudid.     MEDICATIONS  (STANDING):  amLODIPine   Tablet 10 milliGRAM(s) Oral daily  cinacalcet 30 milliGRAM(s) Oral daily  epoetin ye (EPOGEN) Injectable 37703 Unit(s) IV Push <User Schedule>  gabapentin 300 milliGRAM(s) Oral daily  hydrALAZINE 100 milliGRAM(s) Oral three times a day  labetalol 300 milliGRAM(s) Oral three times a day  lidocaine   4% Patch 1 Patch Transdermal daily  lisinopril 40 milliGRAM(s) Oral daily  nicotine -  14 mG/24Hr(s) Patch 1 Patch Transdermal daily  polyethylene glycol 3350 17 Gram(s) Oral daily  senna 2 Tablet(s) Oral at bedtime    MEDICATIONS  (PRN):  HYDROmorphone  Injectable 0.5 milliGRAM(s) IV Push every 6 hours PRN Severe Pain (7 - 10)  melatonin 3 milliGRAM(s) Oral at bedtime PRN Insomnia  traMADol 25 milliGRAM(s) Oral every 12 hours PRN Moderate Pain (4 - 6)      CAPILLARY BLOOD GLUCOSE        I&O's Summary    18 Oct 2023 07:01  -  18 Oct 2023 17:08  --------------------------------------------------------  IN: 0 mL / OUT: 2500 mL / NET: -2500 mL        PHYSICAL EXAM:  Vital Signs Last 24 Hrs  T(C): 36.5 (18 Oct 2023 14:44), Max: 36.8 (17 Oct 2023 21:42)  T(F): 97.7 (18 Oct 2023 14:44), Max: 98.3 (18 Oct 2023 04:28)  HR: 80 (18 Oct 2023 14:44) (75 - 86)  BP: 132/69 (18 Oct 2023 14:44) (122/71 - 146/74)  BP(mean): --  RR: 18 (18 Oct 2023 14:44) (18 - 18)  SpO2: 97% (18 Oct 2023 14:44) (97% - 99%)    Parameters below as of 18 Oct 2023 14:44  Patient On (Oxygen Delivery Method): room air    CONSTITUTIONAL: Well-developed, well-groomed, in no apparent distress  EYES: No conjunctival or scleral injection, non-icteric  RESPIRATORY: clear to auscultation  CARDIOVASCULAR: +S1S2, RRR, no M/G/R; no lower extremity edema. 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  GASTROINTESTINAL: soft, nontender, nondistended  PSYCHIATRIC: alert and oriented    LABS:                        9.6    6.26  )-----------( 126      ( 18 Oct 2023 07:13 )             30.4     10-18    132<L>  |  91<L>  |  64<H>  ----------------------------<  71  4.8   |  22  |  11.12<H>    Ca    9.0      18 Oct 2023 07:10    TPro  7.5  /  Alb  3.3  /  TBili  0.4  /  DBili  x   /  AST  8<L>  /  ALT  7<L>  /  AlkPhos  130<H>  10-18    PT/INR - ( 17 Oct 2023 07:00 )   PT: 12.8 sec;   INR: 1.23 ratio         PTT - ( 17 Oct 2023 07:00 )  PTT:33.3 sec      Urinalysis Basic - ( 18 Oct 2023 07:10 )    Color: x / Appearance: x / SG: x / pH: x  Gluc: 71 mg/dL / Ketone: x  / Bili: x / Urobili: x   Blood: x / Protein: x / Nitrite: x   Leuk Esterase: x / RBC: x / WBC x   Sq Epi: x / Non Sq Epi: x / Bacteria: x        Culture - Joint (collected 17 Oct 2023 10:54)  Source: Hip Synovial Fluid  Gram Stain (17 Oct 2023 22:24):    No polymorphonuclear leukocytes seen per low power field    No organisms seen per oil power field  Preliminary Report (18 Oct 2023 16:32):    No growth to date.    Culture - Blood (collected 16 Oct 2023 13:40)  Source: .Blood Blood  Preliminary Report (18 Oct 2023 17:01):    No growth at 48 Hours    Culture - Blood (collected 16 Oct 2023 13:20)  Source: .Blood Blood  Preliminary Report (18 Oct 2023 17:01):    No growth at 48 Hours
Shweta Donis MD  Hospitalist  Available on MS Teams      PROGRESS NOTE:     Patient is a 58y old  Male who presents with a chief complaint of CC: L hip pain (19 Oct 2023 09:16)      SUBJECTIVE / OVERNIGHT EVENTS:  No acute events overnight.    Pt states he feels much improved since initial hospital presentation. Pain level improved. ROM improved. Feels ready for dc home.    MEDICATIONS  (STANDING):  amLODIPine   Tablet 10 milliGRAM(s) Oral daily  cinacalcet 30 milliGRAM(s) Oral daily  epoetin ye (EPOGEN) Injectable 59973 Unit(s) IV Push <User Schedule>  gabapentin 300 milliGRAM(s) Oral daily  heparin   Injectable 5000 Unit(s) SubCutaneous every 8 hours  hydrALAZINE 100 milliGRAM(s) Oral three times a day  labetalol 300 milliGRAM(s) Oral three times a day  lidocaine   4% Patch 1 Patch Transdermal daily  lisinopril 40 milliGRAM(s) Oral daily  nicotine -  14 mG/24Hr(s) Patch 1 Patch Transdermal daily  polyethylene glycol 3350 17 Gram(s) Oral daily  senna 2 Tablet(s) Oral at bedtime    MEDICATIONS  (PRN):  HYDROmorphone  Injectable 0.5 milliGRAM(s) IV Push every 6 hours PRN Severe Pain (7 - 10)  melatonin 3 milliGRAM(s) Oral at bedtime PRN Insomnia  traMADol 25 milliGRAM(s) Oral every 4 hours PRN Severe Pain (7 - 10)      CAPILLARY BLOOD GLUCOSE        I&O's Summary    18 Oct 2023 07:01  -  19 Oct 2023 07:00  --------------------------------------------------------  IN: 0 mL / OUT: 2500 mL / NET: -2500 mL        PHYSICAL EXAM:  Vital Signs Last 24 Hrs  T(C): 36.8 (19 Oct 2023 12:00), Max: 36.9 (18 Oct 2023 20:15)  T(F): 98.3 (19 Oct 2023 12:00), Max: 98.5 (18 Oct 2023 20:15)  HR: 76 (19 Oct 2023 12:00) (76 - 80)  BP: 115/62 (19 Oct 2023 12:00) (115/62 - 142/70)  BP(mean): --  RR: 18 (19 Oct 2023 12:00) (18 - 18)  SpO2: 99% (19 Oct 2023 12:00) (97% - 99%)    Parameters below as of 19 Oct 2023 12:00  Patient On (Oxygen Delivery Method): room air    CONSTITUTIONAL: Well-developed, well-groomed, in no apparent distress  EYES: No conjunctival or scleral injection, non-icteric  RESPIRATORY: clear to auscultation  CARDIOVASCULAR: +S1S2, RRR, no M/G/R; no lower extremity edema. 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  MUSCULOSKELETAL: ROM about the right lower extremity intact. No obvious joint deformities  GASTROINTESTINAL: soft, nontender, nondistended  PSYCHIATRIC: alert and oriented      LABS:                        9.6    6.26  )-----------( 126      ( 18 Oct 2023 07:13 )             30.4     10-18    132<L>  |  91<L>  |  64<H>  ----------------------------<  71  4.8   |  22  |  11.12<H>    Ca    9.0      18 Oct 2023 07:10    TPro  7.5  /  Alb  3.3  /  TBili  0.4  /  DBili  x   /  AST  8<L>  /  ALT  7<L>  /  AlkPhos  130<H>  10-18          Urinalysis Basic - ( 18 Oct 2023 07:10 )    Color: x / Appearance: x / SG: x / pH: x  Gluc: 71 mg/dL / Ketone: x  / Bili: x / Urobili: x   Blood: x / Protein: x / Nitrite: x   Leuk Esterase: x / RBC: x / WBC x   Sq Epi: x / Non Sq Epi: x / Bacteria: x        Culture - Joint (collected 17 Oct 2023 10:54)  Source: Hip Synovial Fluid  Gram Stain (17 Oct 2023 22:24):    No polymorphonuclear leukocytes seen per low power field    No organisms seen per oil power field  Preliminary Report (18 Oct 2023 16:32):    No growth to date.  
Shweta Donis MD  Hospitalist  Available on MS Teams      PROGRESS NOTE:     Patient is a 58y old  Male who presents with a chief complaint of CC: L hip pain (17 Oct 2023 06:01)      SUBJECTIVE / OVERNIGHT EVENTS:  No overnight events. The patient was seen after IR guided arthrocentesis. No fevers/chills. Continues to endorse pain in the left hip that makes it difficult to ambulate or move.    MEDICATIONS  (STANDING):  acetaminophen     Tablet .. 650 milliGRAM(s) Oral every 6 hours  amLODIPine   Tablet 10 milliGRAM(s) Oral daily  cinacalcet 30 milliGRAM(s) Oral daily  gabapentin 300 milliGRAM(s) Oral daily  hydrALAZINE 100 milliGRAM(s) Oral three times a day  labetalol 300 milliGRAM(s) Oral three times a day  lidocaine   4% Patch 1 Patch Transdermal daily  lisinopril 40 milliGRAM(s) Oral daily  nicotine -  14 mG/24Hr(s) Patch 1 Patch Transdermal daily  polyethylene glycol 3350 17 Gram(s) Oral daily  senna 2 Tablet(s) Oral at bedtime    MEDICATIONS  (PRN):  melatonin 3 milliGRAM(s) Oral at bedtime PRN Insomnia  traMADol 25 milliGRAM(s) Oral every 12 hours PRN Moderate Pain (4 - 6)      CAPILLARY BLOOD GLUCOSE        I&O's Summary    16 Oct 2023 07:01  -  17 Oct 2023 07:00  --------------------------------------------------------  IN: 0 mL / OUT: 2500 mL / NET: -2500 mL        PHYSICAL EXAM:  Vital Signs Last 24 Hrs  T(C): 37.1 (17 Oct 2023 11:00), Max: 37.3 (16 Oct 2023 19:35)  T(F): 98.8 (17 Oct 2023 11:00), Max: 99.1 (16 Oct 2023 19:35)  HR: 82 (17 Oct 2023 11:00) (59 - 93)  BP: 144/75 (17 Oct 2023 11:00) (144/75 - 176/96)  BP(mean): --  RR: 18 (17 Oct 2023 11:00) (18 - 20)  SpO2: 97% (17 Oct 2023 11:00) (96% - 98%)    Parameters below as of 17 Oct 2023 11:00  Patient On (Oxygen Delivery Method): room air    CONSTITUTIONAL: Well-developed, well-groomed, in no apparent distress  EYES: No conjunctival or scleral injection, non-icteric  RESPIRATORY: clear to auscultation  CARDIOVASCULAR: +S1S2, RRR, no M/G/R; no lower extremity edema. 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  GASTROINTESTINAL: soft, nontender, nondistended  PSYCHIATRIC: alert and oriented    LABS:                        10.5   6.70  )-----------( 127      ( 17 Oct 2023 06:59 )             33.9     10-17    135  |  94<L>  |  42<H>  ----------------------------<  68<L>  4.5   |  25  |  8.58<H>    Ca    9.2      17 Oct 2023 07:05    TPro  7.4  /  Alb  3.0<L>  /  TBili  0.4  /  DBili  x   /  AST  10  /  ALT  11  /  AlkPhos  139<H>  10-17    PT/INR - ( 17 Oct 2023 07:00 )   PT: 12.8 sec;   INR: 1.23 ratio         PTT - ( 17 Oct 2023 07:00 )  PTT:33.3 sec      Urinalysis Basic - ( 17 Oct 2023 07:05 )    Color: x / Appearance: x / SG: x / pH: x  Gluc: 68 mg/dL / Ketone: x  / Bili: x / Urobili: x   Blood: x / Protein: x / Nitrite: x   Leuk Esterase: x / RBC: x / WBC x   Sq Epi: x / Non Sq Epi: x / Bacteria: x

## 2023-10-19 NOTE — PROGRESS NOTE ADULT - PROBLEM SELECTOR PLAN 2
On HD MWF  -Renal consulted; HD per renal

## 2023-10-19 NOTE — PROGRESS NOTE ADULT - PROBLEM SELECTOR PLAN 4
DVT ppx: heparin subq  GI ppx: none  Diet: renal diet
DVT ppx: heparin subq  GI ppx: none  Diet: renal diet
-c/w lisinopril, Norvasc, Labetalol, hydralazine with holding parameters

## 2023-10-19 NOTE — DISCHARGE NOTE PROVIDER - HOSPITAL COURSE
HPI:  57 y/o M w/ PMHx of ESRD on HD MWF, HTN presenting with one day history of L hip/leg pain. Patient reports he was in usual state of health until morning PTA when he woke up with severe L hip and thigh pain. Denies any trauma to the area or falls. Movement worsens pain. No obvious alleviating factors. Says at baseline no assistive devices needed for ambulation, however now since the pain has started, he is unable to ambulate due to severe pain. Admits to prior surgery of L hip/leg but unable to elaborate on what procedure. He otherwise denies any fevers, chills, CP, SOB, abd pain, n/v, diarrhea, dysuria.      In ED, afebrile HR 75, 161/91, 99% on RA. Plain films of L hip and pelvis w/o acute pathology. CT pelvis: No acute fracture or dislocation. Findings of bilateral sacroiliitis, similar in appearance as compared to prior CT abdomen and pelvis from 8/22/2023. Small left hip joint effusion of an uncertain etiology. Given IV tylenol, lidocaine patch, oxycodone, and IV morphine 4mg x1. Admitted to medicine for further management.    (16 Oct 2023 11:19)    Hospital Course: 58 year old male with a PMHx of ESRD on HD MWF and HTN who presents with acute left leg pain and inability to bear weight. No trauma. Plain films L hip and pelvis unremarkable. CT pelvis: CT pelvis: No acute fracture or dislocation. Findings of bilateral sacroiliitis, similar in appearance as compared to prior CT abdomen and pelvis from 8/22/2023. Small left hip joint effusion of an uncertain etiology. Patient with severe pain requiring IV dilaudid, pt s/p Left hip athrocentesis for small left hip effusion , negative for septic arthritis; lab called to try to add on crystal analysis to joint fluid. Orthopedic consulted and appreciated input, no surgical intervention required.  Patient with  ESRD on dialysis.  On HD MWF ;Renal consulted; HD per renal. Pain improved , seen by PT no skilled needs . Patient medically cleared for discharge .                Important Medication Changes and Reason:    Active or Pending Issues Requiring Follow-up:    Advanced Directives:   [ ] Full code  [ ] DNR  [ ] Hospice    Discharge Diagnoses:  Left hip pain  Left hip effusion  ESRD on HD         HPI:  59 y/o M w/ PMHx of ESRD on HD MWF, HTN presenting with one day history of L hip/leg pain. Patient reports he was in usual state of health until morning PTA when he woke up with severe L hip and thigh pain. Denies any trauma to the area or falls. Movement worsens pain. No obvious alleviating factors. Says at baseline no assistive devices needed for ambulation, however now since the pain has started, he is unable to ambulate due to severe pain. Admits to prior surgery of L hip/leg but unable to elaborate on what procedure. He otherwise denies any fevers, chills, CP, SOB, abd pain, n/v, diarrhea, dysuria.      In ED, afebrile HR 75, 161/91, 99% on RA. Plain films of L hip and pelvis w/o acute pathology. CT pelvis: No acute fracture or dislocation. Findings of bilateral sacroiliitis, similar in appearance as compared to prior CT abdomen and pelvis from 8/22/2023. Small left hip joint effusion of an uncertain etiology. Given IV tylenol, lidocaine patch, oxycodone, and IV morphine 4mg x1. Admitted to medicine for further management.    (16 Oct 2023 11:19)    Hospital Course: 58 year old male with a PMHx of ESRD on HD MWF and HTN who presents with acute left leg pain and inability to bear weight. No trauma. Plain films L hip and pelvis unremarkable. CT pelvis: CT pelvis: No acute fracture or dislocation. Findings of bilateral sacroiliitis, similar in appearance as compared to prior CT abdomen and pelvis from 8/22/2023. Small left hip joint effusion of an uncertain etiology. Patient with severe pain requiring IV dilaudid, pt s/p Left hip athrocentesis for small left hip effusion , negative for septic arthritis; lab called to try to add on crystal analysis to joint fluid. Orthopedic consulted and appreciated input, no surgical intervention required.  Patient with  ESRD on dialysis.  On HD MWF ;Renal consulted; HD per renal. Pain improved , seen by PT no skilled needs . Patient medically cleared for discharge .      Important Medication Changes and Reason: n/a    Advanced Directives:   [x] Full code  [ ] DNR  [ ] Hospice    Discharge Diagnoses:  Left hip pain  Left hip effusion  ESRD on HD    On 10/19/23, case was discussed with Dr. Donis, patient is medically cleared and optimized for discharge today. All medications were reviewed with attending, and sent to mutually agreed upon pharmacy. SW available to assist with any needs patient might have.   HPI:  58 year old male with a PMHx of ESRD on HD MWF and HTN presenting with one day history of L hip/leg pain. Patient reports he was in usual state of health until the morning prior to arrival when he woke up with severe left hip and thigh pain. Denies any trauma to the area or falls. Movement worsens pain. No obvious alleviating factors. Says at baseline no assistive devices needed for ambulation, however now since the pain has started, he is unable to ambulate due to severe pain. Admits to prior surgery of L hip/leg but unable to elaborate on what procedure. He otherwise denies any fevers, chills, CP, SOB, abd pain, n/v, diarrhea, dysuria.      In ED, afebrile HR 75, 161/91, 99% on RA. Plain films of L hip and pelvis w/o acute pathology. CT pelvis: No acute fracture or dislocation. Findings of bilateral sacroiliitis, similar in appearance as compared to prior CT abdomen and pelvis from 8/22/2023. Small left hip joint effusion of an uncertain etiology. Given IV tylenol, lidocaine patch, oxycodone, and IV morphine 4mg x1. Admitted to medicine for further management.    (16 Oct 2023 11:19)      Hospital Course: CT pelvis showed no acute fracture or dislocation. There were findings of bilateral sacroiliitis, similar in appearance as compared to prior CT abdomen and pelvis from 8/22/2023. Small left hip joint effusion of an uncertain etiology. Patient with severe pain requiring IV dilaudid. He underwent left hip arthrocentesis for small left hip effusion, negative for septic arthritis. Attempted to add on crystal analysis to joint fluid but was unable. Orthopedic consulted and appreciated input, no surgical intervention required.  Pain improved with supportive care and range of motion improved. As per PT evaluation, the patient has no skilled PT needs. Patient underwent routine dialysis sessions (MWF) while admitted. Patient medically cleared for discharge .      Important Medication Changes and Reason: n/a    Advanced Directives:   [x] Full code  [ ] DNR  [ ] Hospice    Discharge Diagnoses:  Left hip pain  Left hip effusion  ESRD on HD    On 10/19/23, case was discussed with Dr. Donis, patient is medically cleared and optimized for discharge today. All medications were reviewed with attending, and sent to mutually agreed upon pharmacy. SW available to assist with any needs patient might have.

## 2023-10-19 NOTE — PROGRESS NOTE ADULT - PROBLEM SELECTOR PLAN 3
-c/w lisinopril, Norvasc, Labetalol, hydralazine with holding parameters
-c/w lisinopril, Norvasc, Labetalol, hydralazine with holding parameters
Tentative plan for the OR for joint wash out, pending results of arthrocentesis   - EKG reviewed: NSR. No abnormal T wave inversions or ST changes  - Pt reports prior hx of stress test 1 year ago. Pt unsure of the results but was not recommended for re-vascularization procedure, per the patient  - No history of exertional angina or dyspnea on exertion  - METS > 4  - RCRI 1 point  - the patient is medically optimized for the OR

## 2023-10-19 NOTE — PROGRESS NOTE ADULT - REASON FOR ADMISSION
CC: L hip pain

## 2023-10-19 NOTE — DISCHARGE NOTE NURSING/CASE MANAGEMENT/SOCIAL WORK - NSDCPEFALRISK_GEN_ALL_CORE
For information on Fall & Injury Prevention, visit: [email or web address] OR  [email or web address] OR  https://www.cdc.gov/steadi/patient.html

## 2023-10-19 NOTE — PROGRESS NOTE ADULT - PROBLEM SELECTOR PROBLEM 2
Visit Information Date & Time Provider Department Dept. Phone Encounter #  
 11/28/2017 11:20 AM Boubacar Lee MD [place] [id number] [facility]  Follow-up Instructions Return in about 6 months (around 5/28/2018) for hypertension. Upcoming Health Maintenance Date Due  
 PAP AKA CERVICAL CYTOLOGY 12/1/2019 DTaP/Tdap/Td series (2 - Td) 6/29/2025 Allergies as of 11/28/2017  Review Complete On: 11/28/2017 By: Boubacar Lee MD  
  
 Severity Noted Reaction Type Reactions Hydrochlorothiazide  09/18/2011   Side Effect Shortness of Breath Methyldopa  09/18/2011   Side Effect Other (comments) LFT increases Procardia [Nifedipine]  09/18/2011   Side Effect Other (comments)  
 headache  
 Sulfa (Sulfonamide Antibiotics)  09/07/2011    Hives Current Immunizations  Reviewed on 11/28/2017 Name Date Tdap 6/29/2015  8:48 AM  
  
 Reviewed by Boubacar Lee MD on 11/28/2017 at 11:42 AM  
You Were Diagnosed With   
  
 Codes Comments Benign hypertension    -  Primary ICD-10-CM: I10 
ICD-9-CM: 401.1 Encounter for long-term (current) use of medications     ICD-10-CM: Z79.899 ICD-9-CM: V58.69 Vitals BP Pulse Temp Resp Height(growth percentile) Weight(growth percentile) 104/66 (BP 1 Location: Left arm, BP Patient Position: Sitting) 70 98.8 °F (37.1 °C) (Oral) 14 5' 1\" (1.549 m) 132 lb (59.9 kg) LMP SpO2 BMI OB Status Smoking Status 11/26/2017 (Exact Date) 100% 24.94 kg/m2 Having regular periods Never Smoker Vitals History BMI and BSA Data Body Mass Index Body Surface Area 24.94 kg/m 2 1.61 m 2 Preferred Pharmacy Pharmacy Name Phone [facility] [place] [address] [phone] Your Updated Medication List  
  
   
This list is accurate as of: 11/28/17 11:51 AM.  Always use your most recent med list.  
  
  
  
  
 lisinopril 10 mg tablet
Commonly known as:  Haley Sebastian Take 1 Tab by mouth daily. mometasone 0.1 % topical cream  
Commonly known as:  Mel Ojeda Apply  to affected area daily. VITAMIN D3 2,000 unit Tab Generic drug:  cholecalciferol (vitamin D3) Take  by mouth. We Performed the Following CBC WITH AUTOMATED DIFF [49712 CPT(R)] LIPID PANEL [21338 CPT(R)] METABOLIC PANEL, COMPREHENSIVE [15221 CPT(R)] URINALYSIS W/ RFLX MICROSCOPIC [85829 CPT(R)] Follow-up Instructions Return in about 6 months (around 5/28/2018) for hypertension. To-Do List   
 12/14/2017 10:00 AM  
  Appointment with [facility] 3 at [address] [place] [address] ([phone]) Shower or bathe using soap and water. Do not use deodorant, powder, perfumes, or lotion the day of your exam.  If your prior mammograms were not performed at [facility] 6 please bring films with you or forward prior images 2 days before your procedure. Check in at registration 15min before your appointment time unless you were instructed to do otherwise. A script is not necessary, but if you have one, please bring it on the day of the mammogram or have it faxed to the department. [facility] [phone] [facility]  [phone] [facility] [address]  [phone] [facility] [phone] [address] [phone] [facility]! Dear Riri Zabala: Thank you for requesting a [employer] account. Our records indicate that you already have an active [employer] account. You can access your account anytime at [email or web address] Did you know that you can access your hospital and ER discharge instructions at any time in [employer]? You can also review all of your test results from your hospital stay or ER visit. Additional Information If you have questions, please visit the Frequently Asked Questions section of the [employer] website at [email or web address]
ESRD on dialysis
Remember, MyChart is NOT to be used for urgent needs. For medical emergencies, dial 911. Now available from your iPhone and Android! Please provide this summary of care documentation to your next provider. Your primary care clinician is listed as Tashia George. If you have any questions after today's visit, please call [phone].
ESRD on dialysis
ESRD on dialysis

## 2023-10-19 NOTE — DISCHARGE NOTE PROVIDER - NSDCCPCAREPLAN_GEN_ALL_CORE_FT
PRINCIPAL DISCHARGE DIAGNOSIS  Diagnosis: Left hip pain  Assessment and Plan of Treatment: CT pelvis: CT pelvis: No acute fracture or dislocation. Findings of bilateral sacroiliitis, similar in appearance as compared to prior CT abdomen and pelvis from 8/22/2023. Small left hip joint effusion of an uncertain etiology.   Pain control: standing Tylenol, lidocaine patch, tramadol prn for severe pain. Patient had a hip aspiration cultures  negative for septic arthritis   -        SECONDARY DISCHARGE DIAGNOSES  Diagnosis: HTN (hypertension)  Assessment and Plan of Treatment: HTN (hypertension).   ·  Plan: -c/w lisinopril, Norvasc, Labetalol, hydralazine       Diagnosis: ESRD on dialysis  Assessment and Plan of Treatment: Avoid taking (NSAIDs) - (ex: Ibuprofen, Advil, Celebrex, Naprosyn)  Avoid taking any nephrotoxic agents (can harm kidneys) - Intravenous contrast for diagnostic testing, combination cold medications.  Have all medications adjusted for your renal function by your Health Care Provider.  Blood pressure control is important.  Take all medication as prescribed.

## 2023-10-21 LAB
CULTURE RESULTS: SIGNIFICANT CHANGE UP
SPECIMEN SOURCE: SIGNIFICANT CHANGE UP

## 2023-10-31 LAB
CULTURE RESULTS: SIGNIFICANT CHANGE UP
CULTURE RESULTS: SIGNIFICANT CHANGE UP
SPECIMEN SOURCE: SIGNIFICANT CHANGE UP
SPECIMEN SOURCE: SIGNIFICANT CHANGE UP

## 2023-10-31 RX ORDER — CINACALCET 30 MG/1
0 TABLET, FILM COATED ORAL
Refills: 0 | DISCHARGE

## 2023-10-31 RX ORDER — LABETALOL HCL 100 MG
0 TABLET ORAL
Refills: 0 | DISCHARGE

## 2023-10-31 RX ORDER — HYDRALAZINE HCL 50 MG
1 TABLET ORAL
Refills: 0 | DISCHARGE

## 2023-10-31 RX ORDER — LISINOPRIL 2.5 MG/1
0 TABLET ORAL
Refills: 0 | DISCHARGE

## 2023-10-31 RX ORDER — HYDRALAZINE HCL 50 MG
0 TABLET ORAL
Refills: 0 | DISCHARGE

## 2023-10-31 RX ORDER — SIMVASTATIN 20 MG/1
0 TABLET, FILM COATED ORAL
Refills: 0 | DISCHARGE

## 2023-10-31 RX ORDER — AMLODIPINE BESYLATE 2.5 MG/1
1 TABLET ORAL
Refills: 0 | DISCHARGE

## 2023-11-01 PROBLEM — E78.5 HYPERLIPIDEMIA, UNSPECIFIED: Chronic | Status: ACTIVE | Noted: 2023-09-10

## 2023-11-20 ENCOUNTER — APPOINTMENT (OUTPATIENT)
Dept: VASCULAR SURGERY | Facility: CLINIC | Age: 58
End: 2023-11-20
Payer: MEDICAID

## 2023-11-20 PROBLEM — N18.6 END STAGE RENAL DISEASE: Chronic | Status: ACTIVE | Noted: 2023-09-10

## 2023-11-20 PROBLEM — I10 ESSENTIAL (PRIMARY) HYPERTENSION: Chronic | Status: ACTIVE | Noted: 2023-09-10

## 2023-11-20 PROCEDURE — 93990 DOPPLER FLOW TESTING: CPT

## 2023-11-20 PROCEDURE — 99214 OFFICE O/P EST MOD 30 MIN: CPT

## 2024-02-04 ENCOUNTER — INPATIENT (INPATIENT)
Facility: HOSPITAL | Age: 59
LOS: 0 days | Discharge: LEFT BEFORE DISCHARGE | DRG: 640 | End: 2024-02-04
Attending: STUDENT IN AN ORGANIZED HEALTH CARE EDUCATION/TRAINING PROGRAM | Admitting: STUDENT IN AN ORGANIZED HEALTH CARE EDUCATION/TRAINING PROGRAM
Payer: MEDICAID

## 2024-02-04 VITALS
OXYGEN SATURATION: 98 % | HEIGHT: 70 IN | HEART RATE: 92 BPM | TEMPERATURE: 98 F | DIASTOLIC BLOOD PRESSURE: 81 MMHG | RESPIRATION RATE: 20 BRPM | SYSTOLIC BLOOD PRESSURE: 162 MMHG | WEIGHT: 176.37 LBS

## 2024-02-04 VITALS
SYSTOLIC BLOOD PRESSURE: 145 MMHG | HEART RATE: 89 BPM | DIASTOLIC BLOOD PRESSURE: 75 MMHG | RESPIRATION RATE: 20 BRPM | OXYGEN SATURATION: 97 %

## 2024-02-04 DIAGNOSIS — E87.5 HYPERKALEMIA: ICD-10-CM

## 2024-02-04 DIAGNOSIS — E78.5 HYPERLIPIDEMIA, UNSPECIFIED: ICD-10-CM

## 2024-02-04 DIAGNOSIS — I10 ESSENTIAL (PRIMARY) HYPERTENSION: ICD-10-CM

## 2024-02-04 DIAGNOSIS — N18.6 END STAGE RENAL DISEASE: ICD-10-CM

## 2024-02-04 DIAGNOSIS — Z98.89 OTHER SPECIFIED POSTPROCEDURAL STATES: Chronic | ICD-10-CM

## 2024-02-04 DIAGNOSIS — Z29.9 ENCOUNTER FOR PROPHYLACTIC MEASURES, UNSPECIFIED: ICD-10-CM

## 2024-02-04 DIAGNOSIS — I77.0 ARTERIOVENOUS FISTULA, ACQUIRED: Chronic | ICD-10-CM

## 2024-02-04 LAB
ALBUMIN SERPL ELPH-MCNC: 3.2 G/DL — LOW (ref 3.3–5)
ALP SERPL-CCNC: 94 U/L — SIGNIFICANT CHANGE UP (ref 40–120)
ALT FLD-CCNC: 6 U/L — LOW (ref 10–45)
ANION GAP SERPL CALC-SCNC: 21 MMOL/L — HIGH (ref 5–17)
ANION GAP SERPL CALC-SCNC: 22 MMOL/L — HIGH (ref 5–17)
AST SERPL-CCNC: 15 U/L — SIGNIFICANT CHANGE UP (ref 10–40)
BASE EXCESS BLDV CALC-SCNC: -4.1 MMOL/L — LOW (ref -2–3)
BASOPHILS # BLD AUTO: 0.05 K/UL — SIGNIFICANT CHANGE UP (ref 0–0.2)
BASOPHILS NFR BLD AUTO: 0.5 % — SIGNIFICANT CHANGE UP (ref 0–2)
BILIRUB SERPL-MCNC: 0.3 MG/DL — SIGNIFICANT CHANGE UP (ref 0.2–1.2)
BUN SERPL-MCNC: 110 MG/DL — HIGH (ref 7–23)
BUN SERPL-MCNC: 115 MG/DL — HIGH (ref 7–23)
CA-I SERPL-SCNC: 1.12 MMOL/L — LOW (ref 1.15–1.33)
CALCIUM SERPL-MCNC: 8.3 MG/DL — LOW (ref 8.4–10.5)
CALCIUM SERPL-MCNC: 8.9 MG/DL — SIGNIFICANT CHANGE UP (ref 8.4–10.5)
CHLORIDE BLDV-SCNC: 103 MMOL/L — SIGNIFICANT CHANGE UP (ref 96–108)
CHLORIDE SERPL-SCNC: 100 MMOL/L — SIGNIFICANT CHANGE UP (ref 96–108)
CHLORIDE SERPL-SCNC: 101 MMOL/L — SIGNIFICANT CHANGE UP (ref 96–108)
CO2 BLDV-SCNC: 23 MMOL/L — SIGNIFICANT CHANGE UP (ref 22–26)
CO2 SERPL-SCNC: 19 MMOL/L — LOW (ref 22–31)
CO2 SERPL-SCNC: 20 MMOL/L — LOW (ref 22–31)
CREAT SERPL-MCNC: 18.71 MG/DL — HIGH (ref 0.5–1.3)
CREAT SERPL-MCNC: 18.92 MG/DL — HIGH (ref 0.5–1.3)
EGFR: 3 ML/MIN/1.73M2 — LOW
EGFR: 3 ML/MIN/1.73M2 — LOW
EOSINOPHIL # BLD AUTO: 0.16 K/UL — SIGNIFICANT CHANGE UP (ref 0–0.5)
EOSINOPHIL NFR BLD AUTO: 1.7 % — SIGNIFICANT CHANGE UP (ref 0–6)
FLUAV AG NPH QL: SIGNIFICANT CHANGE UP
FLUBV AG NPH QL: SIGNIFICANT CHANGE UP
GAS PNL BLDV: 137 MMOL/L — SIGNIFICANT CHANGE UP (ref 136–145)
GAS PNL BLDV: SIGNIFICANT CHANGE UP
GLUCOSE BLDC GLUCOMTR-MCNC: 81 MG/DL — SIGNIFICANT CHANGE UP (ref 70–99)
GLUCOSE BLDV-MCNC: 123 MG/DL — HIGH (ref 70–99)
GLUCOSE SERPL-MCNC: 124 MG/DL — HIGH (ref 70–99)
GLUCOSE SERPL-MCNC: 79 MG/DL — SIGNIFICANT CHANGE UP (ref 70–99)
HCO3 BLDV-SCNC: 22 MMOL/L — SIGNIFICANT CHANGE UP (ref 22–29)
HCT VFR BLD CALC: 32.2 % — LOW (ref 39–50)
HCT VFR BLDA CALC: 33 % — LOW (ref 39–51)
HGB BLD CALC-MCNC: 11.1 G/DL — LOW (ref 12.6–17.4)
HGB BLD-MCNC: 10.3 G/DL — LOW (ref 13–17)
IMM GRANULOCYTES NFR BLD AUTO: 0.4 % — SIGNIFICANT CHANGE UP (ref 0–0.9)
LACTATE BLDV-MCNC: 1.1 MMOL/L — SIGNIFICANT CHANGE UP (ref 0.5–2)
LYMPHOCYTES # BLD AUTO: 1.59 K/UL — SIGNIFICANT CHANGE UP (ref 1–3.3)
LYMPHOCYTES # BLD AUTO: 16.8 % — SIGNIFICANT CHANGE UP (ref 13–44)
MAGNESIUM SERPL-MCNC: 2.3 MG/DL — SIGNIFICANT CHANGE UP (ref 1.6–2.6)
MCHC RBC-ENTMCNC: 28.2 PG — SIGNIFICANT CHANGE UP (ref 27–34)
MCHC RBC-ENTMCNC: 32 GM/DL — SIGNIFICANT CHANGE UP (ref 32–36)
MCV RBC AUTO: 88.2 FL — SIGNIFICANT CHANGE UP (ref 80–100)
MONOCYTES # BLD AUTO: 0.95 K/UL — HIGH (ref 0–0.9)
MONOCYTES NFR BLD AUTO: 10 % — SIGNIFICANT CHANGE UP (ref 2–14)
NEUTROPHILS # BLD AUTO: 6.7 K/UL — SIGNIFICANT CHANGE UP (ref 1.8–7.4)
NEUTROPHILS NFR BLD AUTO: 70.6 % — SIGNIFICANT CHANGE UP (ref 43–77)
NRBC # BLD: 0 /100 WBCS — SIGNIFICANT CHANGE UP (ref 0–0)
NT-PROBNP SERPL-SCNC: HIGH PG/ML (ref 0–300)
PCO2 BLDV: 41 MMHG — LOW (ref 42–55)
PH BLDV: 7.33 — SIGNIFICANT CHANGE UP (ref 7.32–7.43)
PHOSPHATE SERPL-MCNC: 6 MG/DL — HIGH (ref 2.5–4.5)
PLATELET # BLD AUTO: 174 K/UL — SIGNIFICANT CHANGE UP (ref 150–400)
PO2 BLDV: 42 MMHG — SIGNIFICANT CHANGE UP (ref 25–45)
POTASSIUM BLDV-SCNC: 5.8 MMOL/L — HIGH (ref 3.5–5.1)
POTASSIUM SERPL-MCNC: 5.7 MMOL/L — HIGH (ref 3.5–5.3)
POTASSIUM SERPL-MCNC: 5.9 MMOL/L — HIGH (ref 3.5–5.3)
POTASSIUM SERPL-SCNC: 5.7 MMOL/L — HIGH (ref 3.5–5.3)
POTASSIUM SERPL-SCNC: 5.9 MMOL/L — HIGH (ref 3.5–5.3)
PROT SERPL-MCNC: 7.1 G/DL — SIGNIFICANT CHANGE UP (ref 6–8.3)
RBC # BLD: 3.65 M/UL — LOW (ref 4.2–5.8)
RBC # FLD: 17.4 % — HIGH (ref 10.3–14.5)
RSV RNA NPH QL NAA+NON-PROBE: SIGNIFICANT CHANGE UP
SAO2 % BLDV: 65.6 % — LOW (ref 67–88)
SARS-COV-2 RNA SPEC QL NAA+PROBE: DETECTED
SODIUM SERPL-SCNC: 141 MMOL/L — SIGNIFICANT CHANGE UP (ref 135–145)
SODIUM SERPL-SCNC: 142 MMOL/L — SIGNIFICANT CHANGE UP (ref 135–145)
WBC # BLD: 9.49 K/UL — SIGNIFICANT CHANGE UP (ref 3.8–10.5)
WBC # FLD AUTO: 9.49 K/UL — SIGNIFICANT CHANGE UP (ref 3.8–10.5)

## 2024-02-04 PROCEDURE — 71045 X-RAY EXAM CHEST 1 VIEW: CPT | Mod: 26

## 2024-02-04 PROCEDURE — 87637 SARSCOV2&INF A&B&RSV AMP PRB: CPT

## 2024-02-04 PROCEDURE — 96376 TX/PRO/DX INJ SAME DRUG ADON: CPT

## 2024-02-04 PROCEDURE — 80053 COMPREHEN METABOLIC PANEL: CPT

## 2024-02-04 PROCEDURE — 99291 CRITICAL CARE FIRST HOUR: CPT

## 2024-02-04 PROCEDURE — 84295 ASSAY OF SERUM SODIUM: CPT

## 2024-02-04 PROCEDURE — 99223 1ST HOSP IP/OBS HIGH 75: CPT

## 2024-02-04 PROCEDURE — 82330 ASSAY OF CALCIUM: CPT

## 2024-02-04 PROCEDURE — 96374 THER/PROPH/DIAG INJ IV PUSH: CPT

## 2024-02-04 PROCEDURE — 82962 GLUCOSE BLOOD TEST: CPT

## 2024-02-04 PROCEDURE — 99285 EMERGENCY DEPT VISIT HI MDM: CPT

## 2024-02-04 PROCEDURE — 85018 HEMOGLOBIN: CPT

## 2024-02-04 PROCEDURE — 82435 ASSAY OF BLOOD CHLORIDE: CPT

## 2024-02-04 PROCEDURE — 84132 ASSAY OF SERUM POTASSIUM: CPT

## 2024-02-04 PROCEDURE — 71045 X-RAY EXAM CHEST 1 VIEW: CPT

## 2024-02-04 PROCEDURE — 84100 ASSAY OF PHOSPHORUS: CPT

## 2024-02-04 PROCEDURE — 83605 ASSAY OF LACTIC ACID: CPT

## 2024-02-04 PROCEDURE — 99406 BEHAV CHNG SMOKING 3-10 MIN: CPT

## 2024-02-04 PROCEDURE — 85025 COMPLETE CBC W/AUTO DIFF WBC: CPT

## 2024-02-04 PROCEDURE — 82947 ASSAY GLUCOSE BLOOD QUANT: CPT

## 2024-02-04 PROCEDURE — 85014 HEMATOCRIT: CPT

## 2024-02-04 PROCEDURE — 83735 ASSAY OF MAGNESIUM: CPT

## 2024-02-04 PROCEDURE — 83880 ASSAY OF NATRIURETIC PEPTIDE: CPT

## 2024-02-04 PROCEDURE — 94640 AIRWAY INHALATION TREATMENT: CPT

## 2024-02-04 PROCEDURE — 82803 BLOOD GASES ANY COMBINATION: CPT

## 2024-02-04 PROCEDURE — 96375 TX/PRO/DX INJ NEW DRUG ADDON: CPT

## 2024-02-04 PROCEDURE — 80048 BASIC METABOLIC PNL TOTAL CA: CPT

## 2024-02-04 RX ORDER — LABETALOL HCL 100 MG
1 TABLET ORAL
Refills: 0 | DISCHARGE

## 2024-02-04 RX ORDER — LISINOPRIL 2.5 MG/1
1 TABLET ORAL
Refills: 0 | DISCHARGE

## 2024-02-04 RX ORDER — CHLORHEXIDINE GLUCONATE 213 G/1000ML
1 SOLUTION TOPICAL DAILY
Refills: 0 | Status: DISCONTINUED | OUTPATIENT
Start: 2024-02-04 | End: 2024-02-04

## 2024-02-04 RX ORDER — SODIUM CHLORIDE 9 MG/ML
100 INJECTION INTRAMUSCULAR; INTRAVENOUS; SUBCUTANEOUS
Refills: 0 | Status: DISCONTINUED | OUTPATIENT
Start: 2024-02-04 | End: 2024-02-04

## 2024-02-04 RX ORDER — LABETALOL HCL 100 MG
300 TABLET ORAL THREE TIMES A DAY
Refills: 0 | Status: DISCONTINUED | OUTPATIENT
Start: 2024-02-04 | End: 2024-02-04

## 2024-02-04 RX ORDER — AMLODIPINE BESYLATE 2.5 MG/1
1 TABLET ORAL
Refills: 0 | DISCHARGE

## 2024-02-04 RX ORDER — SODIUM ZIRCONIUM CYCLOSILICATE 10 G/10G
10 POWDER, FOR SUSPENSION ORAL ONCE
Refills: 0 | Status: COMPLETED | OUTPATIENT
Start: 2024-02-04 | End: 2024-02-04

## 2024-02-04 RX ORDER — AMLODIPINE BESYLATE 2.5 MG/1
10 TABLET ORAL DAILY
Refills: 0 | Status: DISCONTINUED | OUTPATIENT
Start: 2024-02-04 | End: 2024-02-04

## 2024-02-04 RX ORDER — DEXTROSE 50 % IN WATER 50 %
25 SYRINGE (ML) INTRAVENOUS ONCE
Refills: 0 | Status: COMPLETED | OUTPATIENT
Start: 2024-02-04 | End: 2024-02-04

## 2024-02-04 RX ORDER — ALBUTEROL 90 UG/1
2.5 AEROSOL, METERED ORAL ONCE
Refills: 0 | Status: DISCONTINUED | OUTPATIENT
Start: 2024-02-04 | End: 2024-02-04

## 2024-02-04 RX ORDER — INSULIN HUMAN 100 [IU]/ML
5 INJECTION, SOLUTION SUBCUTANEOUS ONCE
Refills: 0 | Status: COMPLETED | OUTPATIENT
Start: 2024-02-04 | End: 2024-02-04

## 2024-02-04 RX ORDER — ERGOCALCIFEROL 1.25 MG/1
1 CAPSULE ORAL
Refills: 0 | DISCHARGE

## 2024-02-04 RX ORDER — CINACALCET 30 MG/1
1 TABLET, FILM COATED ORAL
Refills: 0 | DISCHARGE

## 2024-02-04 RX ORDER — CALCIUM GLUCONATE 100 MG/ML
1 VIAL (ML) INTRAVENOUS ONCE
Refills: 0 | Status: COMPLETED | OUTPATIENT
Start: 2024-02-04 | End: 2024-02-04

## 2024-02-04 RX ORDER — ATORVASTATIN CALCIUM 80 MG/1
40 TABLET, FILM COATED ORAL AT BEDTIME
Refills: 0 | Status: DISCONTINUED | OUTPATIENT
Start: 2024-02-04 | End: 2024-02-04

## 2024-02-04 RX ORDER — GABAPENTIN 400 MG/1
1 CAPSULE ORAL
Refills: 0 | DISCHARGE

## 2024-02-04 RX ORDER — SODIUM BICARBONATE 1 MEQ/ML
50 SYRINGE (ML) INTRAVENOUS ONCE
Refills: 0 | Status: COMPLETED | OUTPATIENT
Start: 2024-02-04 | End: 2024-02-04

## 2024-02-04 RX ORDER — DEXTROSE 50 % IN WATER 50 %
50 SYRINGE (ML) INTRAVENOUS ONCE
Refills: 0 | Status: COMPLETED | OUTPATIENT
Start: 2024-02-04 | End: 2024-02-04

## 2024-02-04 RX ORDER — CINACALCET 30 MG/1
30 TABLET, FILM COATED ORAL DAILY
Refills: 0 | Status: DISCONTINUED | OUTPATIENT
Start: 2024-02-04 | End: 2024-02-04

## 2024-02-04 RX ORDER — SEVELAMER CARBONATE 2400 MG/1
800 POWDER, FOR SUSPENSION ORAL EVERY 12 HOURS
Refills: 0 | Status: DISCONTINUED | OUTPATIENT
Start: 2024-02-04 | End: 2024-02-04

## 2024-02-04 RX ORDER — HYDRALAZINE HCL 50 MG
100 TABLET ORAL THREE TIMES A DAY
Refills: 0 | Status: DISCONTINUED | OUTPATIENT
Start: 2024-02-04 | End: 2024-02-04

## 2024-02-04 RX ORDER — ALBUTEROL 90 UG/1
10 AEROSOL, METERED ORAL ONCE
Refills: 0 | Status: COMPLETED | OUTPATIENT
Start: 2024-02-04 | End: 2024-02-04

## 2024-02-04 RX ADMIN — Medication 100 GRAM(S): at 06:09

## 2024-02-04 RX ADMIN — ALBUTEROL 10 MILLIGRAM(S): 90 AEROSOL, METERED ORAL at 10:27

## 2024-02-04 RX ADMIN — Medication 25 MILLILITER(S): at 08:35

## 2024-02-04 RX ADMIN — Medication 50 MILLIEQUIVALENT(S): at 06:09

## 2024-02-04 RX ADMIN — SODIUM ZIRCONIUM CYCLOSILICATE 10 GRAM(S): 10 POWDER, FOR SUSPENSION ORAL at 07:19

## 2024-02-04 RX ADMIN — INSULIN HUMAN 5 UNIT(S): 100 INJECTION, SOLUTION SUBCUTANEOUS at 07:20

## 2024-02-04 RX ADMIN — Medication 50 MILLILITER(S): at 07:19

## 2024-02-04 NOTE — ED ADULT NURSE NOTE - CHIEF COMPLAINT QUOTE
usual HD schedule is MWF; missed last 2 treatments, last treatment was Monday; c/o sob and fatigue; placed on oxygen per pt request at 1 liter NC

## 2024-02-04 NOTE — H&P ADULT - PROBLEM SELECTOR PLAN 2
- given lokelma, calcium gluconate, albuterol nebs, insulin+dextrose  - no EKG changes  - should correct w/HD, repeat BMP after HD

## 2024-02-04 NOTE — H&P ADULT - NSHPADDITIONALINFOADULT_GEN_ALL_CORE
Plan discussed with KIERAN Desouza.    Marlena Bundy, DO  Available on Teams [place]    Prefer discharge tomorrow after morning labs. Patient says he will leave later today regardless of AMA vs discharge.

## 2024-02-04 NOTE — H&P ADULT - NSHPREVIEWOFSYSTEMS_GEN_ALL_CORE
REVIEW OF SYSTEMS:    CONSTITUTIONAL: No weakness, weight loss, fevers or chills  EYES/ENT: No visual changes;  No vertigo or throat pain   NECK: No pain or stiffness  RESPIRATORY: No cough, wheezing, hemoptysis; No shortness of breath  CARDIOVASCULAR: No chest pain or palpitations, MORIN  GASTROINTESTINAL: No abdominal or epigastric pain. No nausea, vomiting, or hematemesis; No diarrhea or constipation. No melena or hematochezia.  GENITOURINARY: No dysuria, frequency or hematuria  NEUROLOGICAL: No numbness or weakness, AAOX3  SKIN: No itching, rashes  MUSCULOSKELETAL: no joint erythema, no joint swelling  PSYCHIATRIC: no depression, no anxiety

## 2024-02-04 NOTE — H&P ADULT - ASSESSMENT
58M w/ pmh ESRD on HD, HTN, presents to [facility] for cc of missed dialysis for the last 2 sessions. Found to be hyperkalemic but no EKG changes. Pending urgent dialysis.

## 2024-02-04 NOTE — ED ADULT NURSE NOTE - OBJECTIVE STATEMENT
Pt is a 58y M PMH HTN, HLD, ESRD on dialysis MWF c/o sob, fatigue. Pt states he has been feeling SOB for a couple of days. Pt states he missed last 2 dialysis sessions as pt "does not want certain dialysis technician" states he feels "they will mess up his fistula". Pt presents w/ LUE fistula. Pt denies fever, chills, cp, NVD, abd pain. Pt resting in stretcher, placed on cardiac monitor, bed in lowest position, aware of plan of care. Comfort and safety measures maintained.

## 2024-02-04 NOTE — ED PROVIDER NOTE - PHYSICAL EXAMINATION
GENERAL: Awake, alert, NAD  HEAD: NC/AT, neck supple, moist mucous membranes, nasal congestion noted  EYES: PERRL, EOM grossly intact, sclera anicteric  LUNGS: normal WOB on RA, faint crackles at bilateral bases  CARDIAC: RRR, no m/r/g  ABDOMEN: Soft, non tender, non distended, no rebound, no guarding  BACK: No midline spinal tenderness, no CVA tenderness  EXT: No edema, no calf tenderness, no deformities.  NEURO: A&Ox3. Moving all extremities.  SKIN: Warm and dry. No rash.  PSYCH: Normal affect.

## 2024-02-04 NOTE — CONSULT NOTE ADULT - ASSESSMENT
urgent hd arranged  on call HD RN informed 58M w/ pmh ESRD on HD, HTN, presents to [facility] for cc of missed dialysis for the last 2 sessions. Found to be hyperkalemic but no EKG changes.   Renal consulted for ESRD Mx.     ESRD on dialysis.    otupt HD schedule MWF  HD unit- [facility]  chronic non adherence w/hd schedule  Hyperkalemia, fluid overload 2/2 missed hd rxs  Informed consent for HD obtained from pt. in chart   s/p last HD 1/29    s/p d50+RI, lokelma 10gm pox1  urgent hd arranged as soon as pt is admitted w/2k bath, uf 3-3.5kg as tolerated   on call HD RN informed  Ultrafiltration on Dialysis as tolerated with blood pressure   renal diet, fluid restriction 1L/day  dose all meds for ESRD  Hyperphosphatemia - renal diet, sevelamer 2tidac    HTN, controlled-bp stable.   resume home meds  amLODIPine 10 mg oral tablet: 1 tab(s) orally once a day Note:Last filled in July for 1 month supply (04 Feb 2024 12:24)  hydrALAZINE 100 mg oral tablet: 1 tab(s) orally 3 times a day (04 Feb 2024 12:24)  labetalol 300 mg oral tablet: 1 tab(s) orally 3 times a day (04 Feb 2024 12:24)  lisinopril 40 mg oral tablet: 1 tab(s) orally once a day     Thanks for consulting. will closely follow up.   poc d/w pt, HD RN, ER team  labs, rad, chart reviewed  For any question, pl call:  Nephrology  Cell -[phone]  Office [phone]  Ans Serv [phone]

## 2024-02-04 NOTE — H&P ADULT - NSHPPHYSICALEXAM_GEN_ALL_CORE
Vital Signs Last 24 Hrs  T(C): 36.6 (04 Feb 2024 06:15), Max: 36.6 (04 Feb 2024 06:15)  T(F): 97.8 (04 Feb 2024 06:15), Max: 97.8 (04 Feb 2024 06:15)  HR: 90 (04 Feb 2024 07:46) (90 - 92)  BP: 140/82 (04 Feb 2024 07:46) (140/82 - 162/81)  BP(mean): 96 (04 Feb 2024 05:45) (96 - 96)  RR: 22 (04 Feb 2024 07:46) (19 - 22)  SpO2: 98% (04 Feb 2024 07:46) (97% - 100%)    Parameters below as of 04 Feb 2024 07:46  Patient On (Oxygen Delivery Method): nasal cannula  O2 Flow (L/min): 1      CONSTITUTIONAL: Well-groomed, in no apparent distress  EYES: No conjunctival or scleral injection, non-icteric; PERRLA and symmetric  ENMT: No external nasal lesions; no pharyngeal injection or exudates, oral mucosa with moist membranes  NECK: Trachea midline without palpable neck mass; thyroid not enlarged and non-tender  RESPIRATORY: Breathing comfortably; lungs CTA without wheeze/rhonchi/rales  CARDIOVASCULAR: +S1S2, RRR, no M/G/R; pedal pulses full and symmetric; no lower extremity edema  GASTROINTESTINAL: No palpable masses or tenderness, +BS throughout, no rebound/guarding  MUSCULOSKELETAL: no digital clubbing or cyanosis; no paraspinal tenderness; normal strength and tone of extremities. LUE AVF w/palpable thrill  SKIN: No rashes or ulcers noted; no subcutaneous nodules or induration palpable  NEUROLOGIC: CN II-XII intact; sensation intact in LEs b/l to light touch  PSYCHIATRIC: A+O x 3; mood and affect appropriate; appropriate insight and judgment

## 2024-02-04 NOTE — ED ADULT NURSE REASSESSMENT NOTE - NS ED NURSE REASSESS COMMENT FT1
Received report from night nurse Subrena. Pt appearing comfortable and sleeping without complaints at this time. Dextrose and insulin initiated to address hyperkalemia. NSR on the monitor. VSS. Safety and comfort measures maintained.

## 2024-02-04 NOTE — H&P ADULT - PROBLEM SELECTOR PLAN 1
- missed 2 dialysis sessions, counseled patient on the importance of NOT missing dialysis sessions as they can lead to dangerous situations regarding his electrolytes  - nephrology following, patient going for urgent dialysis today  - despite not making urine and having missing 2 sessions patient does not appear volume overloaded, and BNP is actually lower than in the past  - cont sensipar and sevelamer

## 2024-02-04 NOTE — H&P ADULT - NSICDXPASTSURGICALHX_GEN_ALL_CORE_FT
PAST SURGICAL HISTORY:  AV fistula Left upper arm 2/2006    History of open reduction and internal fixation (ORIF) procedure Left Leg  1990  Hardware subsequently removed. Year unknown

## 2024-02-04 NOTE — ED PROVIDER NOTE - CRTICAL CARE TIME SPENT (MIN)
IMPRESSION:    Acute hypoxemic resp failure - extubtaed  Acute hypercapnic resp failure - improving   Acute decompensated HF / HO HFpEF   Fluid overload / R thora -> exudative  b/l pleural effusions and L pleurx - HO malignant effusion (likely breast origin)  HO Probable LAWRENCE refused testing   Thyroid mass SP FNA   HO LE cellulitis with ulcer and abscess MRSA +  HO Afib on Eliquis  Chronic lymphedema  HO Peripheral vascular disease    PLAN:    CNS: no sedation     HEENT: Oral care.    PULMONARY:  HOB @ 45 degrees. target pox > 92 %   NIV as needed  s/p thoracentesis 7/5 -> exudative   Cyto path is negative for malignant cells on right thora    CARDIOVASCULAR:  Avoid volume overload. Strict I's and O's. Target MAP>65. Lasix increase to BID.  Echo with normal EF.     GI: GI prophylaxis.  feeding per speech    RENAL:  Follow up lytes. Correct as needed.    INFECTIOUS DISEASE: RVP negative;. cx negative. DTA negative. Follow pleural fluid cx,  Finish Aztreonam and doxycycline for now. Nasal MRSA negative    HEMATOLOGICAL:  Full AC LMWH. Moniot CBC and Coags. Hgb stable.     ENDOCRINE:  Follow up FS.  Insulin protocol if needed.    MUSCULOSKELETAL: IAT. PT/OT    Ba: 7/2  Lines: Periphers    Disposition: DGTF  Palliative eval  Prognosis is poor overall   voiding trial    35

## 2024-02-04 NOTE — ED PROVIDER NOTE - PROGRESS NOTE DETAILS
Lisa Villa (Rodriguez) PGY3 received sign out on this patient. overnight team unable to reach Dick Rose's office for urgent HD. x2 office calls and 1 teams call. I contacted the house nephrology fellow who provided the following number for a partner of the patients nephro team [phone]. no answer, no option to leave a voicemail. Lisa Villa (Rodriguez) PGY3 I spoke to Dr. Corral of this practice, he is aware of pt and will inform his team. I simultaneously received a message from house nephro fellow that she reached Dr. Evin Traylor, who took the pt's information and will reach out shortly. hyperK meds given. will repeat ecg/labs to assess pt progress. Attending note (Isaias): Patient endorsed to me at signout 58-year-old male with history of end-stage renal disease on hemodialysis presenting to the ED after missing last 2 dialysis sessions.  Found to be hyperkalemic 5.9 and has been ordered for calcium bicarb dextrose insulin and Lokelma.  Pending completion of medications we will continue monitoring repeat EKG repeat BMP to assess potassium.  ED resident was contacted nephrology service to arrange urgent inpatient hemodialysis.  Will continue to monitor and plan for admission.  At present now cardiac dysrhythmias on the monitor if develops any signs of hemodynamic instability will consult MICU give additional medications. Lisa Villa (Rodriguez) PGY3 nephrology at bedside. Attending note (Isaias): Repeat EKG shows continued peaked T waves in precordial leads however QRS narrowed representing improvement.  Repeat BMP pending.  Nephrology at bedside. Lisa Villa (Rodriguez) PGY3 hospitalist aware of ecg changes, which are improved since arrival. accepts admission to her service. Attending note (Isaias): Patient has been admitted, seen by nephrology improving potassium on BMP that was repeated improving EKG but still concerning and needing emergent dialysis being set up for dialysis for shortly however I was alerted by ED RN at this time that patient no longer wished to stay.  ED RN had called and spoken to admitting inpatient KIERAN Patricio and they had also spoken to Dr. Smiley from inpatient medicine who are aware that patient wants to leave.  At this time still awaiting their arrival however ED RN reports patient was attempting to leave ED RN removed IV and patient walked out.  In ED resident was grabbed and attempted as well to convince patient to stay however patient reportedly was adamant.  I called and escalated this to the hospitalist in charge went to see patient but he had already exited the building.  Per ED resident and ED RNs patient was informed multiple times of concern for hyperkalemia risk of sudden cardiac death and need for hemodialysis at which point patient reportedly said that he has dialysis scheduled tomorrow and is capable of making his own medical decisions.

## 2024-02-04 NOTE — H&P ADULT - HISTORY OF PRESENT ILLNESS
58M w/ pmh ESRD on HD, HTN, presents to [facility] for cc of missed dialysis for the last 2 sessions. He says he does not like the nurse that works with him at his HD center, and so he stopped going. Currently he denies any symptoms. No fevers/chills, shortness of breath, chest pain, palpitations. Just wants to get his dialysis today and leave. He will leave AMA if he is not discharged later today. He has never been diagnosed with covid before though he tested positive this morning. Denies any cold symptoms.

## 2024-02-04 NOTE — ED PROVIDER NOTE - NSICDXPASTSURGICALHX_GEN_ALL_CORE_FT
PAST SURGICAL HISTORY:  AV fistula Left upper arm 2/2006    History of open reduction and internal fixation (ORIF) procedure Left Leg  1990  Hardware subsequently removed. Year unknown    No significant past surgical history     No significant past surgical history

## 2024-02-04 NOTE — ED ADULT NURSE REASSESSMENT NOTE - NS ED NURSE REASSESS COMMENT FT1
Patient walked out of the room stating that he is going home. Teams called and Teams messaged ACPs Waqas Smiley and Lyly Patricio to make them aware patient wants to leave. Tried to explain to patient how dangerous it would be to leave without receiving dialysis, patient states he "understands the risks, I've made up my mind and I want to leave." Re-emphasized to the patient that leaving without dialysis could result in a fatal arrhythmia and ultimately death, patient states "I understand, I'm a grown man that can make my own decisions." Peripheral IV removed by RN.

## 2024-02-04 NOTE — ED ADULT TRIAGE NOTE - IDEAL BODY WEIGHT(KG)
----- Message from Susan Lewis MD sent at 3/30/2022 11:21 PM EDT -----  Regarding: follow up  Ivan ,   Can this patient be scheduled with follow up with me re ovarian cyst on CT   It can be next week thanks 73

## 2024-02-04 NOTE — ED PROVIDER NOTE - OBJECTIVE STATEMENT
58Y M PMH ESRD on HD MWF, HTN, HLD, GERD presenting with SOB. 58Y M PMH ESRD on HD MWF, HTN, HLD, GERD presenting with SOB. Patient reports that he has missed last 2 HD sessions as he does not like the dialysis technician who has recently been working at his center, feels that they do not do a good job of accessing his fistula so has not gone to sessions. Now presenting with shortness of breath and need for HD run. Denies chest pain, fevers/chills, vomiting, diarrhea, leg swelling.    Primary Nephro: Dick Rose PMD: Himanshu Aguiar

## 2024-02-04 NOTE — H&P ADULT - PROBLEM SELECTOR PLAN 5
low improve score, patient ambulating, no chemical dvt ppx  renal diet    Unable to verify home meds w/[facility] in [place] - no one would . Med rec done based on last admission and what patient could remember he was taking at home.    Cigarette use disorder - 6 minutes counseling provided to discontinue smoking. Offered nicotine patch however he declined.

## 2024-02-04 NOTE — ED PROVIDER NOTE - ATTENDING CONTRIBUTION TO CARE
Hx: pt with ESRD on HD, missed last 2 sessions because "I didn't want them to mess up my fistula" now presenting with dyspnea for several days, malaise, fatigue, cough.  No chest pain.      PE: malaised appearing but nontoxic, mild respiratory distress with tachypnea but no retractions, slight JVD noted, coarse bs b/l, RRR, ab soft, nt.     MDM: missed dialysis, high risk for life-threatening cardiac arrythmia -- educated patient on risks of missed dialysis including cardiac arrythmia and death -- screen for infection, metabolic derangement, anemia.  check cbc r/o anemia or leukocytosis, check bmp to r/o metabolic derangement and lyte imbalance, trop, probnp, cxr, flu/covid, start calcium gluconate IV.    Progress Note 645a: pt with PVCs but no Vtach, EKG reviewed by me showing SR80 with peaked T waves in septal leads, no ADRIENNE, labs showing K=5.8, CXR films reviewed by me showing prominent interstitial markings with cephalization of vessels concerning for fluid overload.  Pt is also covid +.  Pt will require emergent dialysis.

## 2024-02-04 NOTE — ED PROVIDER NOTE - CARE PLAN
Principal Discharge DX:	Shortness of breath  Secondary Diagnosis:	ESRD on dialysis   1 Principal Discharge DX:	Hyperkalemia  Secondary Diagnosis:	ESRD on dialysis

## 2024-02-04 NOTE — ED PROVIDER NOTE - NSICDXPASTMEDICALHX_GEN_ALL_CORE_FT
PAST MEDICAL HISTORY:  Benign Essential Hypertension     ESRD on dialysis     ESRD on hemodialysis     GERD (gastroesophageal reflux disease)     HLD (hyperlipidemia)     HLD (hyperlipidemia)     HTN (hypertension)     HTN (hypertension)     Hyperlipidemia     Hypertension     Tobacco use     Vitamin D deficiency

## 2024-02-04 NOTE — ED ADULT TRIAGE NOTE - CHIEF COMPLAINT QUOTE
usual HD schedule is MWF; missed last 2 treatments, last treatment was Monday; c/o sob and fatigue usual HD schedule is MWF; missed last 2 treatments, last treatment was Monday; c/o sob and fatigue; placed on oxygen per pt request at 1 liter NC

## 2024-02-04 NOTE — CHART NOTE - NSCHARTNOTEFT_GEN_A_CORE
I was informed by the nurse that patient walked out of the ED without receiving dialysis. His IV was removed prior.   Attending and dialysis nurse informed. I was informed by the nurse that patient walked out of the ED without receiving dialysis. His IV was removed prior.   Attending and dialysis nurse informed.  I spoke with the patient on his cell phone. He stated he just wanted to leave and he drove home. He stated he will go to dialysis tomorrow. I informed the patient how dangerous his potassium level was and the dangers of leave including death. I informed him that he can come back to the ED at any time and encouraged him to return but he said no and that he was "good".

## 2024-02-04 NOTE — ED ADULT NURSE NOTE - NSFALLRISKINTERV_ED_ALL_ED

## 2024-02-04 NOTE — CONSULT NOTE ADULT - SUBJECTIVE AND OBJECTIVE BOX
[facility] - S Germaine / Fatmata S /D Diaz/ S Ellis/ JEREMY Langley/ Juliano West / KRISTIAN Francois/ O Milton  service -[phone], office [phone]  ---------------------------------------------------------------------------------------------------------------    Patient is a 58y Male whom presented to the hospital with   Denied recent NSAID use/Abx use/iv contrast studies.    Patient seen and examined    PAST MEDICAL & SURGICAL HISTORY:  Benign Essential Hypertension      Hypertension      GERD (gastroesophageal reflux disease)      ESRD on hemodialysis      Hyperlipidemia      Vitamin D deficiency      Tobacco use      HTN (hypertension)      HLD (hyperlipidemia)      ESRD on dialysis      HTN (hypertension)      HLD (hyperlipidemia)      History of open reduction and internal fixation (ORIF) procedure  Left Leg  1990  Hardware subsequently removed. Year unknown      AV fistula  Left upper arm 2/2006      No significant past surgical history      No significant past surgical history          Allergies: No Known Allergies    Home Medications Reviewed  Hospital Medications:   MEDICATIONS  (STANDING):    SOCIAL HISTORY:  Denies ETOh,Smoking, illicit drug use  FAMILY HISTORY:  Family history of chronic renal disease    Family history of lymphoma    Family history of hypertension in mother  HTN        REVIEW OF SYSTEMS:  CONSTITUTIONAL: No weakness, fevers or chills  EYES/ENT: No visual changes;  No vertigo or throat pain   NECK: No pain or stiffness  RESPIRATORY: No cough, wheezing, hemoptysis; No shortness of breath  CARDIOVASCULAR: No chest pain or palpitations.  GASTROINTESTINAL: No abdominal or epigastric pain. No nausea, vomiting, or hematemesis; No diarrhea or constipation. No melena or hematochezia.  GENITOURINARY: No dysuria, frequency, foamy urine, urinary urgency, incontinence or hematuria  NEUROLOGICAL: No numbness or weakness  SKIN: No itching, burning, rashes, or lesions   VASCULAR: No bilateral lower extremity edema.   All other review of systems is negative unless indicated above.    VITALS:  T(F): 97.8 (02-04-24 @ 06:15), Max: 97.8 (02-04-24 @ 06:15)  HR: 90 (02-04-24 @ 07:46)  BP: 140/82 (02-04-24 @ 07:46)  RR: 22 (02-04-24 @ 07:46)  SpO2: 98% (02-04-24 @ 07:46)  Wt(kg): --    Height (cm): 177.8 (02-04 @ 04:50)  Weight (kg): 80 (02-04 @ 04:50)  BMI (kg/m2): 25.3 (02-04 @ 04:50)  BSA (m2): 1.98 (02-04 @ 04:50)    PHYSICAL EXAM:  Constitutional: NAD  HEENT: anicteric sclera, oropharynx clear, MMM  Neck: No JVD  Respiratory: CTAB, no wheezes, rales or rhonchi  Cardiovascular: S1, S2, RRR  Gastrointestinal: BS+, soft, NT/ND  Extremities: No cyanosis or clubbing. No peripheral edema  Neurological: A/O x 3, no focal deficits  Psychiatric: Normal mood, normal affect  : No CVA tenderness. No rubio.   Skin: No rashes  Vascular Access:    LABS:  02-04    141  |  100  |  110<H>  ----------------------------<  124<H>  5.9<H>   |  20<L>  |  18.92<H>    Ca    8.9      04 Feb 2024 05:53  Phos  6.0     02-04  Mg     2.3     02-04    TPro  7.1  /  Alb  3.2<L>  /  TBili  0.3  /  DBili      /  AST  15  /  ALT  6<L>  /  AlkPhos  94  02-04    Creatinine Trend: 18.92 <--                        10.3   9.49  )-----------( 174      ( 04 Feb 2024 05:53 )             32.2     Urine Studies:  Urinalysis Basic - ( 04 Feb 2024 05:53 )    Color:  / Appearance:  / SG:  / pH:   Gluc: 124 mg/dL / Ketone:   / Bili:  / Urobili:    Blood:  / Protein:  / Nitrite:    Leuk Esterase:  / RBC:  / WBC    Sq Epi:  / Non Sq Epi:  / Bacteria:           RADIOLOGY & ADDITIONAL STUDIES:                 [facility] - S Germaine / Fatmata S /D Diaz/ S Ellis/ JEREMY Langley/ Juliano West / KRISTIAN Brownu/ O Milton  service -[phone], office [phone]  ---------------------------------------------------------------------------------------------------------------  Patient seen and examined in ER    58M w/ pmh ESRD on HD, HTN, presents to [facility] for cc of missed dialysis for the last 2 sessions. He says he does not like the nurse that works with him at his HD center, and so he stopped going.  Renal consulted for ESRD Mx. pt well known to me from [facility]. missed dialysis for the last 2 sessions. Currently he denied any symptoms. denied fevers/chills, shortness of breath, chest pain, palpitations.   pt tested +ve for COVID 19 in er    PAST MEDICAL & SURGICAL HISTORY:  Benign Essential Hypertension      Hypertension      GERD (gastroesophageal reflux disease)      ESRD on hemodialysis      Hyperlipidemia      Vitamin D deficiency      Tobacco use      HTN (hypertension)      HLD (hyperlipidemia)      ESRD on dialysis      HTN (hypertension)      HLD (hyperlipidemia)      History of open reduction and internal fixation (ORIF) procedure  Left Leg  1990  Hardware subsequently removed. Year unknown      AV fistula  Left upper arm 2/2006      No significant past surgical history      No significant past surgical history      Allergies: No Known Allergies    Home Medications Reviewed  Home Medications:  amLODIPine 10 mg oral tablet: 1 tab(s) orally once a day Note:Last filled in July for 1 month supply (04 Feb 2024 12:24)  ergocalciferol 1.25 mg (50,000 intl units) oral capsule: 1 cap(s) orally once a week (04 Feb 2024 12:24)  gabapentin 300 mg oral tablet: orally once a day (at bedtime) (04 Feb 2024 12:24)  hydrALAZINE 100 mg oral tablet: 1 tab(s) orally 3 times a day (04 Feb 2024 12:24)  labetalol 300 mg oral tablet: 1 tab(s) orally 3 times a day (04 Feb 2024 12:24)  lisinopril 40 mg oral tablet: 1 tab(s) orally once a day (04 Feb 2024 12:24)  Sensipar 30 mg oral tablet: 1 tab(s) orally once a day (04 Feb 2024 12:24)    Hospital Medications:   MEDICATIONS  (STANDING):    SOCIAL HISTORY:  Denies ETOh,Smoking, illicit drug use  FAMILY HISTORY:  Family history of chronic renal disease    Family history of lymphoma    Family history of hypertension in mother  HTN      REVIEW OF SYSTEMS:  CONSTITUTIONAL: No weakness, fevers or chills  EYES/ENT: No visual changes;  No vertigo or throat pain   NECK: No pain or stiffness  RESPIRATORY: No cough, wheezing, hemoptysis; + shortness of breath  CARDIOVASCULAR: No chest pain or palpitations.  GASTROINTESTINAL: No abdominal or epigastric pain. No nausea, vomiting, or hematemesis; No diarrhea or constipation. No melena or hematochezia.  NEUROLOGICAL: No numbness or weakness  SKIN: No itching, burning, rashes, or lesions   VASCULAR: No bilateral lower extremity edema.   All other review of systems is negative unless indicated above.    VITALS:  T(F): 97.8 (02-04-24 @ 06:15), Max: 97.8 (02-04-24 @ 06:15)  HR: 90 (02-04-24 @ 07:46)  BP: 140/82 (02-04-24 @ 07:46)  RR: 22 (02-04-24 @ 07:46)  SpO2: 98% (02-04-24 @ 07:46)  Wt(kg): --    Height (cm): 177.8 (02-04 @ 04:50)  Weight (kg): 80 (02-04 @ 04:50)  BMI (kg/m2): 25.3 (02-04 @ 04:50)  BSA (m2): 1.98 (02-04 @ 04:50)    PHYSICAL EXAM:  Constitutional: NAD  HEENT: anicteric sclera  Neck: No JVD  Respiratory: dec bibasilar BS, no wheezes/crepts  Cardiovascular: S1, S2, RRR  Gastrointestinal: BS+, soft, NT  Extremities: no pedal edema b/l   Neurological: A/O x 3  Psychiatric: Normal mood, normal affect  : No rubio.   Vascular Access: TAMRA avf+thrill    LABS:  02-04    141  |  100  |  110<H>  ----------------------------<  124<H>  5.9<H>   |  20<L>  |  18.92<H>    Ca    8.9      04 Feb 2024 05:53  Phos  6.0     02-04  Mg     2.3     02-04    TPro  7.1  /  Alb  3.2<L>  /  TBili  0.3  /  DBili      /  AST  15  /  ALT  6<L>  /  AlkPhos  94  02-04    Creatinine Trend: 18.92 <--                        10.3   9.49  )-----------( 174      ( 04 Feb 2024 05:53 )             32.2     Urine Studies:  Urinalysis Basic - ( 04 Feb 2024 05:53 )    Color:  / Appearance:  / SG:  / pH:   Gluc: 124 mg/dL / Ketone:   / Bili:  / Urobili:    Blood:  / Protein:  / Nitrite:    Leuk Esterase:  / RBC:  / WBC    Sq Epi:  / Non Sq Epi:  / Bacteria:       RADIOLOGY & ADDITIONAL STUDIES:    < from: Xray Chest 1 View- PORTABLE-Urgent (02.04.24 @ 05:48) >  IMPRESSION:  Clear lungs.    --- End of Report ---    < end of copied text >

## 2024-03-04 NOTE — ED CLERICAL - BED REQUESTED
Incoming call/vm from pt's mother requesting a refill.    Rx loaded    Mother would also like to schedule an appt. Sent message to [facility] to schedule next appt.        27-Jan-2021 03:55

## 2024-03-11 ENCOUNTER — APPOINTMENT (OUTPATIENT)
Dept: VASCULAR SURGERY | Facility: CLINIC | Age: 59
End: 2024-03-11

## 2024-04-30 ENCOUNTER — NON-APPOINTMENT (OUTPATIENT)
Age: 59
End: 2024-04-30

## 2024-04-30 ENCOUNTER — APPOINTMENT (OUTPATIENT)
Dept: CARDIOLOGY | Facility: CLINIC | Age: 59
End: 2024-04-30
Payer: MEDICAID

## 2024-04-30 VITALS
OXYGEN SATURATION: 98 % | HEIGHT: 70 IN | BODY MASS INDEX: 25.05 KG/M2 | SYSTOLIC BLOOD PRESSURE: 120 MMHG | DIASTOLIC BLOOD PRESSURE: 70 MMHG | WEIGHT: 175 LBS | HEART RATE: 80 BPM

## 2024-04-30 DIAGNOSIS — I10 ESSENTIAL (PRIMARY) HYPERTENSION: ICD-10-CM

## 2024-04-30 DIAGNOSIS — D64.9 ANEMIA, UNSPECIFIED: ICD-10-CM

## 2024-04-30 DIAGNOSIS — N18.9 CHRONIC KIDNEY DISEASE, UNSPECIFIED: ICD-10-CM

## 2024-04-30 DIAGNOSIS — E78.5 HYPERLIPIDEMIA, UNSPECIFIED: ICD-10-CM

## 2024-04-30 DIAGNOSIS — Z00.00 ENCOUNTER FOR GENERAL ADULT MEDICAL EXAMINATION W/OUT ABNORMAL FINDINGS: ICD-10-CM

## 2024-04-30 DIAGNOSIS — R01.1 CARDIAC MURMUR, UNSPECIFIED: ICD-10-CM

## 2024-04-30 DIAGNOSIS — R91.8 OTHER NONSPECIFIC ABNORMAL FINDING OF LUNG FIELD: ICD-10-CM

## 2024-04-30 DIAGNOSIS — Z99.2 DEPENDENCE ON RENAL DIALYSIS: ICD-10-CM

## 2024-04-30 PROCEDURE — 93000 ELECTROCARDIOGRAM COMPLETE: CPT

## 2024-04-30 PROCEDURE — 99214 OFFICE O/P EST MOD 30 MIN: CPT | Mod: 25

## 2024-04-30 RX ORDER — HYDRALAZINE HYDROCHLORIDE 100 MG/1
100 TABLET ORAL
Qty: 270 | Refills: 3 | Status: ACTIVE | COMMUNITY
Start: 1900-01-01 | End: 1900-01-01

## 2024-04-30 RX ORDER — SIMVASTATIN 40 MG/1
40 TABLET, FILM COATED ORAL
Qty: 90 | Refills: 1 | Status: ACTIVE | COMMUNITY
Start: 1900-01-01 | End: 1900-01-01

## 2024-04-30 RX ORDER — LISINOPRIL 40 MG/1
40 TABLET ORAL
Qty: 90 | Refills: 1 | Status: ACTIVE | COMMUNITY
Start: 1900-01-01 | End: 1900-01-01

## 2024-04-30 RX ORDER — LABETALOL HYDROCHLORIDE 100 MG/1
100 TABLET, FILM COATED ORAL
Qty: 180 | Refills: 3 | Status: ACTIVE | COMMUNITY
Start: 1900-01-01 | End: 1900-01-01

## 2024-05-01 NOTE — HISTORY OF PRESENT ILLNESS
[FreeTextEntry1] : This is a 59 year y/o male with a PMHx of HTN, HLD, CKD, Anemia presents today for follow up.   The patient here for evaluation of high blood pressure. Patient is currently tolerating the current antihypertensive regime and they deny headaches, stiff neck, visual changes, or PND. The patient has been trying to stay on a low-sodium diet. Patient is on hemodialysis for chronic kidney failure.  The patient is here for follow-up of elevated cholesterol. Patient is currently tolerating medication and denies muscle pain, joint pain, back pain, urinary changes , nausea, vomiting, abdominal pain or diarrhea. The patient is trying to follow a low cholesterol diet.  The patient is also here for CKD they are taking there BP meds regularly and also staying away from OTC nephrotoxic drugs such as NSAIDs and over-the-counter supplements as previously advised. Dialysis M,W,F.  Pt is here to follow up mild anemia reports no black stools, BRBPR or blood in urine.

## 2024-07-25 ENCOUNTER — APPOINTMENT (OUTPATIENT)
Dept: VASCULAR SURGERY | Facility: CLINIC | Age: 59
End: 2024-07-25
Payer: MEDICAID

## 2024-07-25 PROCEDURE — 93990 DOPPLER FLOW TESTING: CPT

## 2024-08-01 ENCOUNTER — INPATIENT (INPATIENT)
Facility: HOSPITAL | Age: 59
LOS: 0 days | Discharge: LEFT BEFORE DISCHARGE | DRG: 189 | End: 2024-08-02
Attending: INTERNAL MEDICINE | Admitting: INTERNAL MEDICINE
Payer: MEDICAID

## 2024-08-01 VITALS
TEMPERATURE: 98 F | RESPIRATION RATE: 16 BRPM | WEIGHT: 186.07 LBS | OXYGEN SATURATION: 93 % | DIASTOLIC BLOOD PRESSURE: 98 MMHG | HEART RATE: 88 BPM | SYSTOLIC BLOOD PRESSURE: 192 MMHG | HEIGHT: 70 IN

## 2024-08-01 DIAGNOSIS — Z29.9 ENCOUNTER FOR PROPHYLACTIC MEASURES, UNSPECIFIED: ICD-10-CM

## 2024-08-01 DIAGNOSIS — I10 ESSENTIAL (PRIMARY) HYPERTENSION: ICD-10-CM

## 2024-08-01 DIAGNOSIS — J81.1 CHRONIC PULMONARY EDEMA: ICD-10-CM

## 2024-08-01 DIAGNOSIS — Z98.89 OTHER SPECIFIED POSTPROCEDURAL STATES: Chronic | ICD-10-CM

## 2024-08-01 DIAGNOSIS — I77.0 ARTERIOVENOUS FISTULA, ACQUIRED: Chronic | ICD-10-CM

## 2024-08-01 DIAGNOSIS — N18.6 END STAGE RENAL DISEASE: ICD-10-CM

## 2024-08-01 DIAGNOSIS — E78.5 HYPERLIPIDEMIA, UNSPECIFIED: ICD-10-CM

## 2024-08-01 LAB
ALBUMIN SERPL ELPH-MCNC: 3.8 G/DL — SIGNIFICANT CHANGE UP (ref 3.3–5)
ALP SERPL-CCNC: 132 U/L — HIGH (ref 40–120)
ALT FLD-CCNC: 11 U/L — SIGNIFICANT CHANGE UP (ref 10–45)
ANION GAP SERPL CALC-SCNC: 18 MMOL/L — HIGH (ref 5–17)
AST SERPL-CCNC: 16 U/L — SIGNIFICANT CHANGE UP (ref 10–40)
BASE EXCESS BLDV CALC-SCNC: 4 MMOL/L — HIGH (ref -2–3)
BASOPHILS # BLD AUTO: 0.05 K/UL — SIGNIFICANT CHANGE UP (ref 0–0.2)
BASOPHILS NFR BLD AUTO: 0.5 % — SIGNIFICANT CHANGE UP (ref 0–2)
BILIRUB SERPL-MCNC: 0.4 MG/DL — SIGNIFICANT CHANGE UP (ref 0.2–1.2)
BUN SERPL-MCNC: 61 MG/DL — HIGH (ref 7–23)
CA-I SERPL-SCNC: 1.18 MMOL/L — SIGNIFICANT CHANGE UP (ref 1.15–1.33)
CALCIUM SERPL-MCNC: 9.5 MG/DL — SIGNIFICANT CHANGE UP (ref 8.4–10.5)
CHLORIDE BLDV-SCNC: 99 MMOL/L — SIGNIFICANT CHANGE UP (ref 96–108)
CHLORIDE SERPL-SCNC: 96 MMOL/L — SIGNIFICANT CHANGE UP (ref 96–108)
CO2 BLDV-SCNC: 31 MMOL/L — HIGH (ref 22–26)
CO2 SERPL-SCNC: 25 MMOL/L — SIGNIFICANT CHANGE UP (ref 22–31)
CREAT SERPL-MCNC: 14.05 MG/DL — HIGH (ref 0.5–1.3)
EGFR: 4 ML/MIN/1.73M2 — LOW
EOSINOPHIL # BLD AUTO: 0.44 K/UL — SIGNIFICANT CHANGE UP (ref 0–0.5)
EOSINOPHIL NFR BLD AUTO: 4.5 % — SIGNIFICANT CHANGE UP (ref 0–6)
GAS PNL BLDV: 135 MMOL/L — LOW (ref 136–145)
GAS PNL BLDV: SIGNIFICANT CHANGE UP
GLUCOSE BLDV-MCNC: 83 MG/DL — SIGNIFICANT CHANGE UP (ref 70–99)
GLUCOSE SERPL-MCNC: 88 MG/DL — SIGNIFICANT CHANGE UP (ref 70–99)
HCO3 BLDV-SCNC: 29 MMOL/L — SIGNIFICANT CHANGE UP (ref 22–29)
HCT VFR BLD CALC: 26.8 % — LOW (ref 39–50)
HCT VFR BLDA CALC: 27 % — LOW (ref 39–51)
HGB BLD CALC-MCNC: 9.1 G/DL — LOW (ref 12.6–17.4)
HGB BLD-MCNC: 8.9 G/DL — LOW (ref 13–17)
IMM GRANULOCYTES NFR BLD AUTO: 0.4 % — SIGNIFICANT CHANGE UP (ref 0–0.9)
LACTATE BLDV-MCNC: 0.7 MMOL/L — SIGNIFICANT CHANGE UP (ref 0.5–2)
LYMPHOCYTES # BLD AUTO: 1.35 K/UL — SIGNIFICANT CHANGE UP (ref 1–3.3)
LYMPHOCYTES # BLD AUTO: 13.7 % — SIGNIFICANT CHANGE UP (ref 13–44)
MAGNESIUM SERPL-MCNC: 2.3 MG/DL — SIGNIFICANT CHANGE UP (ref 1.6–2.6)
MCHC RBC-ENTMCNC: 31.6 PG — SIGNIFICANT CHANGE UP (ref 27–34)
MCHC RBC-ENTMCNC: 33.2 GM/DL — SIGNIFICANT CHANGE UP (ref 32–36)
MCV RBC AUTO: 95 FL — SIGNIFICANT CHANGE UP (ref 80–100)
MONOCYTES # BLD AUTO: 1.13 K/UL — HIGH (ref 0–0.9)
MONOCYTES NFR BLD AUTO: 11.5 % — SIGNIFICANT CHANGE UP (ref 2–14)
NEUTROPHILS # BLD AUTO: 6.83 K/UL — SIGNIFICANT CHANGE UP (ref 1.8–7.4)
NEUTROPHILS NFR BLD AUTO: 69.4 % — SIGNIFICANT CHANGE UP (ref 43–77)
NRBC # BLD: 0 /100 WBCS — SIGNIFICANT CHANGE UP (ref 0–0)
PCO2 BLDV: 46 MMHG — SIGNIFICANT CHANGE UP (ref 42–55)
PH BLDV: 7.41 — SIGNIFICANT CHANGE UP (ref 7.32–7.43)
PLATELET # BLD AUTO: 109 K/UL — LOW (ref 150–400)
PO2 BLDV: 23 MMHG — LOW (ref 25–45)
POTASSIUM BLDV-SCNC: 5.1 MMOL/L — SIGNIFICANT CHANGE UP (ref 3.5–5.1)
POTASSIUM SERPL-MCNC: 5 MMOL/L — SIGNIFICANT CHANGE UP (ref 3.5–5.3)
POTASSIUM SERPL-SCNC: 5 MMOL/L — SIGNIFICANT CHANGE UP (ref 3.5–5.3)
PROT SERPL-MCNC: 7.4 G/DL — SIGNIFICANT CHANGE UP (ref 6–8.3)
RBC # BLD: 2.82 M/UL — LOW (ref 4.2–5.8)
RBC # FLD: 15.3 % — HIGH (ref 10.3–14.5)
SAO2 % BLDV: 37.1 % — LOW (ref 67–88)
SODIUM SERPL-SCNC: 139 MMOL/L — SIGNIFICANT CHANGE UP (ref 135–145)
WBC # BLD: 9.84 K/UL — SIGNIFICANT CHANGE UP (ref 3.8–10.5)
WBC # FLD AUTO: 9.84 K/UL — SIGNIFICANT CHANGE UP (ref 3.8–10.5)

## 2024-08-01 PROCEDURE — 99223 1ST HOSP IP/OBS HIGH 75: CPT

## 2024-08-01 PROCEDURE — 99285 EMERGENCY DEPT VISIT HI MDM: CPT

## 2024-08-01 RX ORDER — CINACALCET 30 MG/1
30 TABLET ORAL DAILY
Refills: 0 | Status: DISCONTINUED | OUTPATIENT
Start: 2024-08-01 | End: 2024-08-02

## 2024-08-01 RX ORDER — LABETALOL HCL 200 MG
300 TABLET ORAL THREE TIMES A DAY
Refills: 0 | Status: DISCONTINUED | OUTPATIENT
Start: 2024-08-01 | End: 2024-08-02

## 2024-08-01 RX ORDER — GABAPENTIN 400 MG/1
300 CAPSULE ORAL AT BEDTIME
Refills: 0 | Status: DISCONTINUED | OUTPATIENT
Start: 2024-08-01 | End: 2024-08-02

## 2024-08-01 RX ORDER — ATORVASTATIN CALCIUM 40 MG/1
40 TABLET, FILM COATED ORAL AT BEDTIME
Refills: 0 | Status: DISCONTINUED | OUTPATIENT
Start: 2024-08-01 | End: 2024-08-02

## 2024-08-01 RX ORDER — CINACALCET 30 MG/1
1 TABLET ORAL
Refills: 0 | DISCHARGE

## 2024-08-01 RX ORDER — LISINOPRIL 10 MG/1
40 TABLET ORAL DAILY
Refills: 0 | Status: DISCONTINUED | OUTPATIENT
Start: 2024-08-01 | End: 2024-08-02

## 2024-08-01 RX ORDER — HEPARIN SODIUM 1000 [USP'U]/ML
5000 INJECTION, SOLUTION INTRAVENOUS; SUBCUTANEOUS EVERY 8 HOURS
Refills: 0 | Status: DISCONTINUED | OUTPATIENT
Start: 2024-08-01 | End: 2024-08-02

## 2024-08-01 RX ORDER — HYDRALAZINE HYDROCHLORIDE 100 MG/1
100 TABLET ORAL THREE TIMES A DAY
Refills: 0 | Status: DISCONTINUED | OUTPATIENT
Start: 2024-08-01 | End: 2024-08-02

## 2024-08-01 RX ORDER — AMLODIPINE BESYLATE 2.5 MG/1
10 TABLET ORAL DAILY
Refills: 0 | Status: DISCONTINUED | OUTPATIENT
Start: 2024-08-01 | End: 2024-08-02

## 2024-08-01 NOTE — ED PROVIDER NOTE - PHYSICAL EXAMINATION
Juliano Wright DO (PGY1)   Physical Exam:    Gen: NAD, AOx3  Head: NCAT  HEENT: EOMI, PEERLA, pink and moist mucous membranes  Lung: Diffuse crackles bilaterally, decreased air movement.  No respiratory distress, no wheezes.  CV: RRR, no murmurs, rubs or gallops  Abd: soft, NT, ND, no guarding, no rigidity, no rebound tenderness, no CVA tenderness   MSK: no visible deformities, ROM normal in UE/LE, no back pain  Neuro: No focal sensory or motor deficits. Sensation intact to light touch all extremities.  Extremity: Peripheral pulses intact, fistula is present in the left antecubital  Skin: Warm, well perfused, no rash, trace pedal edema  Psych: normal affect, calm

## 2024-08-01 NOTE — H&P ADULT - PROBLEM SELECTOR PLAN 3
BMP without significant electrolyte abnormalities  - HD per nephro  - Continue sensipar  - Renal diet  - Strict I/Os  - Avoid nephrotoxic meds, dose renally

## 2024-08-01 NOTE — ED PROVIDER NOTE - CLINICAL SUMMARY MEDICAL DECISION MAKING FREE TEXT BOX
xxx 59-year-old male with past medical history of hypertension, ESRD on HD MWF presenting with shortness of breath.      Physical examination remarkable for diffuse crackles as above.  Patient with rather peaked T waves in V3, V4, V5, potassium is 5.0 here.    DDx includes but is not limited to ESRD on dialysis MWF.    Plan includes basic labs, admission for hemodialysis.  Patient already has x-ray and labs from a few hours ago as PT was earlier seen in this ED, has not received dialysis today.  Reassess.

## 2024-08-01 NOTE — H&P ADULT - NSHPPHYSICALEXAM_GEN_ALL_CORE
Vital Signs Last 24 Hrs  T(C): 36.7 (01 Aug 2024 20:27), Max: 36.9 (01 Aug 2024 14:46)  T(F): 98.1 (01 Aug 2024 20:27), Max: 98.4 (01 Aug 2024 14:46)  HR: 88 (01 Aug 2024 20:27) (88 - 94)  BP: 192/98 (01 Aug 2024 20:27) (177/92 - 212/104)  BP(mean): --  RR: 16 (01 Aug 2024 20:27) (16 - 18)  SpO2: 93% (01 Aug 2024 20:27) (93% - 100%)    Parameters below as of 01 Aug 2024 20:27  Patient On (Oxygen Delivery Method): room air        CONSTITUTIONAL: Well-groomed, in no apparent distress  EYES: No conjunctival or scleral injection, non-icteric; PERRLA and symmetric  ENMT: No external nasal lesions; nasal mucosa not inflamed; oral mucosa with moist membranes  RESPIRATORY: Breathing comfortably; lungs with crackles appreciated in bases B/L  CARDIOVASCULAR: +S1S2, RRR, no M/G/R; trace lower extremity edema  GASTROINTESTINAL: No palpable masses or tenderness, +BS throughout, no rebound/guarding  MUSCULOSKELETAL: No digital clubbing or cyanosis; no paraspinal tenderness; no malalignment of extremities  SKIN: No rashes or ulcers noted; no subcutaneous nodules or induration palpable  NEUROLOGIC: CN grossly intact; sensation intact in LEs b/l to light touch; normal strength and tone  PSYCHIATRIC: A+O x 3; mood and affect appropriate; appropriate insight and judgment

## 2024-08-01 NOTE — ED PROVIDER NOTE - ATTENDING CONTRIBUTION TO CARE
Seen here earlier today after missed HD c/o SOB. Had labs, CXR, EKG, was seen by nephro and had planned to due HD however pt left AMA. Pt now returning w same sx. On my initial review of the EKG from triage was concerning for hyperK and requested pt be brought back, however now upon additional review is noted to be unchanged from prior, further had labs from earlier today w nml K. Will re-send labs now as several hours have past. No need for rpt CXR. Call nephro back, unclear if needs new consents or can use from earlier today. Plan TBA for HD given volume overload and SOB.

## 2024-08-01 NOTE — H&P ADULT - TIME-BASED BILLING (NON-CRITICAL CARE)
Time-based billing (NON-critical care) Dorsal Nasal Flap Text: The defect edges were debeveled with a #15 scalpel blade.  Given the location of the defect and the proximity to free margins a dorsal nasal flap was deemed most appropriate.  Using a sterile surgical marker, an appropriate dorsal nasal flap was drawn around the defect.    The area thus outlined was incised deep to adipose tissue with a #15 scalpel blade.  The skin margins were undermined to an appropriate distance in all directions utilizing iris scissors.

## 2024-08-01 NOTE — ED ADULT NURSE NOTE - OBJECTIVE STATEMENT
59y Male AOx4 with PMH ESRD (dialysis M,W,F), HTN, HLD, GERD presents to the ED c/o SOB. Pt reports he recently missed his Wednesday dialysis session, "I wasn't close by and missed the appointment". Was referred to the ED by provider, left AMA because "it took too long". States he came back because SOB was worsening with lying down. Denies N/V, fever/chills, chest pain. Spontaneous/unlabored respirations, speaking in full sentences. Side rails up, bed in lowest position, safety maintained.

## 2024-08-01 NOTE — H&P ADULT - NSHPREVIEWOFSYSTEMS_GEN_ALL_CORE
Review of Systems:   CONSTITUTIONAL: No fever, weight loss  EYES: No eye pain, visual disturbances, or discharge  ENMT:  No difficulty hearing, tinnitus, vertigo; No sinus or throat pain  RESPIRATORY: No SOB. No cough, wheezing, chills or hemoptysis  CARDIOVASCULAR: No chest pain, palpitations, dizziness, or leg swelling  GASTROINTESTINAL: No abdominal or epigastric pain. No nausea, vomiting, or hematemesis; No diarrhea or constipation. No melena or hematochezia.  GENITOURINARY: No dysuria, frequency, hematuria, or incontinence  NEUROLOGICAL: No headaches, memory loss, loss of strength, numbness, or tremors  SKIN: No itching, burning, rashes, or lesions   LYMPH NODES: No enlarged glands  ENDOCRINE: No heat or cold intolerance; No hair loss  MUSCULOSKELETAL: No joint pain or swelling; No muscle, back pain  PSYCHIATRIC: No depression, anxiety, mood swings, or difficulty sleeping  HEME/LYMPH: No easy bruising, or bleeding gums Review of Systems:   CONSTITUTIONAL: No fever, weight loss  EYES: No eye pain, visual disturbances, or discharge  ENMT:  No difficulty hearing, tinnitus, vertigo; No sinus or throat pain  RESPIRATORY: +SOB. +cough, No wheezing, chills or hemoptysis  CARDIOVASCULAR: No chest pain, palpitations, dizziness, or leg swelling  GASTROINTESTINAL: No abdominal or epigastric pain. No nausea, vomiting, or hematemesis; No diarrhea or constipation. No melena or hematochezia.  GENITOURINARY: No dysuria, frequency, hematuria, or incontinence  NEUROLOGICAL: No headaches, memory loss, loss of strength, numbness, or tremors  SKIN: No itching, burning, rashes, or lesions   MUSCULOSKELETAL: No joint pain or swelling; No muscle, back pain  HEME/LYMPH: No easy bruising, or bleeding gums

## 2024-08-01 NOTE — ED PROVIDER NOTE - CARE PLAN
1 Principal Discharge DX:	End stage renal disease on dialysis   Principal Discharge DX:	Pulmonary edema  Secondary Diagnosis:	ESRD on dialysis  Secondary Diagnosis:	Volume overload

## 2024-08-01 NOTE — H&P ADULT - HISTORY OF PRESENT ILLNESS
59 year old male with hx of HTN, HLD, ESRD on HD presenting after missed dialysis session with SOB.  59 year old male with hx of HTN, HLD, ESRD on HD M/W/F presenting after missed dialysis session with SOB. Patient missed his scheduled dialysis session because he "could not make it" because he was in [place]. Patient with dyspnea on exertion, worse when lying flat. He is also experiencing intermittent cough. Patient came to the ED earlier however left before he was admitted because "it was taking too long". He later returned because he continued to experience shortness of breath. Other symptoms include a headache. Denies chest pain, palpitations, dizziness, constipation, diarrhea. No significant electrolyte abnormalities noted on labs. CXR showing evidence of pulmonary edema. Pt admitted for dialysis.

## 2024-08-01 NOTE — ED PROVIDER NOTE - OBJECTIVE STATEMENT
xxxx 59-year-old male with past medical history of hypertension, ESRD on HD MWF presenting with shortness of breath.  Patient reports feeling generally unwell with increased shortness of breath and productive cough with brown sputum.  Patient notes that these are the same exact symptoms he always experiences when he misses dialysis and he would like to have dialysis today.  He was earlier seen in the emergency department today and had been consented for hemodialysis but he had left AMA as he felt he could go on without hemodialysis that day.  He now returns feeling more unwell and now would like to have his hemodialysis.  Denies fever, chills, night sweats, chest pain, palpitations, abdominal pain, dysuria.

## 2024-08-01 NOTE — H&P ADULT - NSHPPOAPULMEMBOLUS_GEN_A_CORE
Lightheadedness and vaginal bleeding/symptomatic anemia secondary to retained products of conception no

## 2024-08-01 NOTE — H&P ADULT - PROBLEM SELECTOR PLAN 2
Elevated BP in ED up to SBP 190s  - Continue amlodipine 10mg  - Continue hydralazine 100mg TID  - Continue lisinopril 40mg qd  - Continue labetalol 300mg TID

## 2024-08-01 NOTE — H&P ADULT - NSHPLABSRESULTS_GEN_ALL_CORE
08-01    139  |  96  |  61<H>  ----------------------------<  88  5.0   |  25  |  14.05<H>  08-01    140  |  96  |  59<H>  ----------------------------<  87  4.8   |  24  |  13.60<H>    Ca    9.5      01 Aug 2024 21:16  Ca    9.4      01 Aug 2024 13:13  Mg     2.3     08-01    TPro  7.4  /  Alb  3.8  /  TBili  0.4  /  DBili  x   /  AST  16  /  ALT  11  /  AlkPhos  132<H>  08-01  TPro  7.3  /  Alb  3.7  /  TBili  0.3  /  DBili  x   /  AST  14  /  ALT  11  /  AlkPhos  129<H>  08-01    Magnesium: 2.3 mg/dL (08-01-24 @ 21:16)                      Urinalysis Basic - ( 01 Aug 2024 21:16 )    Color: x / Appearance: x / SG: x / pH: x  Gluc: 88 mg/dL / Ketone: x  / Bili: x / Urobili: x   Blood: x / Protein: x / Nitrite: x   Leuk Esterase: x / RBC: x / WBC x   Sq Epi: x / Non Sq Epi: x / Bacteria: x                              8.9    9.84  )-----------( 109      ( 01 Aug 2024 21:16 )             26.8                         8.5    10.19 )-----------( 119      ( 01 Aug 2024 13:13 )             26.5     CAPILLARY BLOOD GLUCOSE 08-01    139  |  96  |  61<H>  ----------------------------<  88  5.0   |  25  |  14.05<H>  08-01    140  |  96  |  59<H>  ----------------------------<  87  4.8   |  24  |  13.60<H>    Ca    9.5      01 Aug 2024 21:16  Ca    9.4      01 Aug 2024 13:13  Mg     2.3     08-01    TPro  7.4  /  Alb  3.8  /  TBili  0.4  /  DBili  x   /  AST  16  /  ALT  11  /  AlkPhos  132<H>  08-01  TPro  7.3  /  Alb  3.7  /  TBili  0.3  /  DBili  x   /  AST  14  /  ALT  11  /  AlkPhos  129<H>  08-01    Magnesium: 2.3 mg/dL (08-01-24 @ 21:16)                      Urinalysis Basic - ( 01 Aug 2024 21:16 )    Color: x / Appearance: x / SG: x / pH: x  Gluc: 88 mg/dL / Ketone: x  / Bili: x / Urobili: x   Blood: x / Protein: x / Nitrite: x   Leuk Esterase: x / RBC: x / WBC x   Sq Epi: x / Non Sq Epi: x / Bacteria: x                 8.9    9.84  )-----------( 109      ( 01 Aug 2024 21:16 )             26.8                         8.5    10.19 )-----------( 119      ( 01 Aug 2024 13:13 )             26.5     IMAGING    < from: Xray Chest 1 View AP/PA (08.01.24 @ 14:32) >    FINDINGS:    Lines/Tubes: None.    Lungs/Pleura: Bilateral perihilar opacities, nonspecific, but suspicious   for pulmonary edema.  No pneumothorax. No pleural effusion.  Moderate   pulmonary vascular congestion.    Heart/Mediastinum:The cardiomediastinal silhouette is enlarged.    Osseous Structures: No acute findings.    IMPRESSION:  Bilateral perihilar opacities, nonspecific, but suspicious for pulmonary   edema.    < end of copied text >

## 2024-08-01 NOTE — H&P ADULT - PROBLEM SELECTOR PLAN 1
Dyspnea, orthopnea, and SOB likely due to pulm edema as seen on CXR. Possibly due to missed HD session vs uncontrolled HTN.  - HD as per nephrology  - BP management as below  - Currently on 2LNC, will wean as tolerated  - Check ambulatory saturations  - Monitor Pulse ox

## 2024-08-01 NOTE — H&P ADULT - ASSESSMENT
59 year old male with hx of HTN, HLD, ESRD on HD M/W/F via LUE AVF presenting after missed dialysis session with SOB. CXR with evidence of pulmonary edema. Pt admitted for dialysis.

## 2024-08-02 VITALS
RESPIRATION RATE: 20 BRPM | SYSTOLIC BLOOD PRESSURE: 171 MMHG | OXYGEN SATURATION: 96 % | WEIGHT: 179.46 LBS | TEMPERATURE: 98 F | HEART RATE: 86 BPM | DIASTOLIC BLOOD PRESSURE: 90 MMHG

## 2024-08-02 PROCEDURE — 80053 COMPREHEN METABOLIC PANEL: CPT

## 2024-08-02 PROCEDURE — 84132 ASSAY OF SERUM POTASSIUM: CPT

## 2024-08-02 PROCEDURE — 82435 ASSAY OF BLOOD CHLORIDE: CPT

## 2024-08-02 PROCEDURE — 85025 COMPLETE CBC W/AUTO DIFF WBC: CPT

## 2024-08-02 PROCEDURE — 83605 ASSAY OF LACTIC ACID: CPT

## 2024-08-02 PROCEDURE — 85014 HEMATOCRIT: CPT

## 2024-08-02 PROCEDURE — 99285 EMERGENCY DEPT VISIT HI MDM: CPT

## 2024-08-02 PROCEDURE — 84295 ASSAY OF SERUM SODIUM: CPT

## 2024-08-02 PROCEDURE — 82330 ASSAY OF CALCIUM: CPT

## 2024-08-02 PROCEDURE — 82947 ASSAY GLUCOSE BLOOD QUANT: CPT

## 2024-08-02 PROCEDURE — 82803 BLOOD GASES ANY COMBINATION: CPT

## 2024-08-02 PROCEDURE — 99261: CPT

## 2024-08-02 PROCEDURE — 83735 ASSAY OF MAGNESIUM: CPT

## 2024-08-02 PROCEDURE — 85018 HEMOGLOBIN: CPT

## 2024-08-02 RX ORDER — HYDRALAZINE HYDROCHLORIDE 100 MG/1
100 TABLET ORAL ONCE
Refills: 0 | Status: COMPLETED | OUTPATIENT
Start: 2024-08-02 | End: 2024-08-02

## 2024-08-02 RX ADMIN — HYDRALAZINE HYDROCHLORIDE 100 MILLIGRAM(S): 100 TABLET ORAL at 00:18

## 2024-08-02 NOTE — ED ADULT NURSE REASSESSMENT NOTE - NS ED NURSE REASSESS COMMENT FT1
Patient signed out of ED AMA. MD Kruger explained risks of signing out AMA to patient. Patient verbalized understanding of risks. Pt A&Ox3. VSS. IV d/c. Patient ambulated out of ED well, steady gait, unassisted.

## 2024-08-02 NOTE — CHART NOTE - NSCHARTNOTEFT_GEN_A_CORE
Called by RN due to patient wanting to sign out AMA. Asked patient why he wanted to leave, reports that he feels better after receiving HD.     Patient is AAO x 3. I explained at length to the patient the risks of signing out AMA including but not limited to harm, injury, or death. I explained the risks, benefits and alternatives to treatment as well as the attendant risks of refusing treatment at this time. I offered to answer any questions and fully answered any such questions. We believe that the patient fully understands what has been explained and answered.   Attending Dr. Joseph notified and made aware of the above. Patient signed AMA form and accepts responsibility for any and all results of this decision.    Return to the ER for any new or worsening symptoms, chest pain, shortness of breath, or if any other concerns.    Hans Kruger PA-C   Department of Medicine

## 2024-08-02 NOTE — ED ADULT NURSE REASSESSMENT NOTE - NS ED NURSE REASSESS COMMENT FT1
Pt expressed interest in leaving AMA, GERARD Kruger notified and will talk to patient in ED. Pt saturating 95% on room air, not c/o SOB or chest pain.

## 2024-08-02 NOTE — DISCHARGE NOTE PROVIDER - NSDCMRMEDTOKEN_GEN_ALL_CORE_FT
amLODIPine 10 mg oral tablet: 1 tab(s) orally once a day Note:Last filled in July for 1 month supply  cinacalcet 30 mg oral tablet: 1 tab(s) orally once a day  ergocalciferol 1.25 mg (50,000 intl units) oral capsule: 1 cap(s) orally once a week  gabapentin 300 mg oral tablet: orally once a day (at bedtime)  hydrALAZINE 100 mg oral tablet: 1 tab(s) orally 3 times a day  labetalol 300 mg oral tablet: 1 tab(s) orally 3 times a day  lisinopril 40 mg oral tablet: 1 tab(s) orally once a day  Sensipar 30 mg oral tablet: 1 tab(s) orally once a day  simvastatin 40 mg oral tablet: 1 tab(s) orally once a day (at bedtime)

## 2024-08-02 NOTE — DISCHARGE NOTE PROVIDER - HOSPITAL COURSE
HPI: Patient agreed to leave AGAINST MEDICAL ADVICE   59 year old male with hx of HTN, HLD, ESRD on HD M/W/F presenting after missed dialysis session with SOB. Patient missed his scheduled dialysis session because he "could not make it" because he was in [place]. Patient with dyspnea on exertion, worse when lying flat. He is also experiencing intermittent cough. Patient came to the ED earlier however left before he was admitted because "it was taking too long". He later returned because he continued to experience shortness of breath. Other symptoms include a headache. Denies chest pain, palpitations, dizziness, constipation, diarrhea. No significant electrolyte abnormalities noted on labs. CXR showing evidence of pulmonary edema. Pt admitted for dialysis.  (01 Aug 2024 22:23)    Hospital Course:  59 year old male with hx of HTN, HLD, ESRD on HD M/W/F via LUE AVF presenting after missed dialysis session with SOB. CXR with evidence of pulmonary edema. Pt admitted for dialysis.        Problem/Plan - 1:  ·  Problem: Pulmonary edema.   ·  Plan: Dyspnea, orthopnea, and SOB likely due to pulm edema as seen on CXR. Possibly due to missed HD session vs uncontrolled HTN.  - HD as per nephrology  - BP management as below  - Currently on 2LNC, will wean as tolerated  - Check ambulatory saturations  - Monitor Pulse ox.     Problem/Plan - 2:  ·  Problem: Hypertension.   ·  Plan: Elevated BP in ED up to SBP 190s  - Continue amlodipine 10mg  - Continue hydralazine 100mg TID  - Continue lisinopril 40mg qd  - Continue labetalol 300mg TID.     Problem/Plan - 3:  ·  Problem: ESRD on dialysis.   ·  Plan: BMP without significant electrolyte abnormalities  - HD per nephro  - Continue sensipar  - Renal diet  - Strict I/Os  - Avoid nephrotoxic meds, dose renally.     Problem/Plan - 4:  ·  Problem: HLD (hyperlipidemia).   ·  Plan: - Continue statin.     Problem/Plan - 5:  ·  Problem: Prophylactic measure.   ·  Plan: DVT ppx - heparin subq  Diet - DASH, renal  Dispo - pending.    Called by RN due to patient wanting to sign out AMA. Asked patient why he wanted to leave, reports that he feels better after receiving HD.     Patient is AAO x 3. I explained at length to the patient the risks of signing out AMA including but not limited to harm, injury, or death. I explained the risks, benefits and alternatives to treatment as well as the attendant risks of refusing treatment at this time. I offered to answer any questions and fully answered any such questions. We believe that the patient fully understands what has been explained and answered.   Attending Dr. Joseph notified and made aware of the above. Patient signed AMA form and accepts responsibility for any and all results of this decision.    Return to the ER for any new or worsening symptoms, chest pain, shortness of breath, or if any other concerns.    Important Medication Changes and Reason: Patient agreed to leave AGAINST MEDICAL ADVICE     Active or Pending Issues Requiring Follow-up:Patient agreed to leave AGAINST MEDICAL ADVICE     Advanced Directives:   [ ] Full code  [ ] DNR  [ ] Hospice    Discharge Diagnoses:

## 2024-08-07 ENCOUNTER — NON-APPOINTMENT (OUTPATIENT)
Age: 59
End: 2024-08-07

## 2024-08-27 ENCOUNTER — RESULT REVIEW (OUTPATIENT)
Age: 59
End: 2024-08-27

## 2024-08-27 ENCOUNTER — APPOINTMENT (OUTPATIENT)
Dept: ENDOVASCULAR SURGERY | Facility: CLINIC | Age: 59
End: 2024-08-27
Payer: MEDICAID

## 2024-08-27 DIAGNOSIS — N18.6 END STAGE RENAL DISEASE: ICD-10-CM

## 2024-08-27 DIAGNOSIS — T82.898A OTHER SPECIFIED COMPLICATION OF VASCULAR PROSTHETIC DEVICES, IMPLANTS AND GRAFTS, INITIAL ENCOUNTER: ICD-10-CM

## 2024-08-27 DIAGNOSIS — I77.0 ARTERIOVENOUS FISTULA, ACQUIRED: ICD-10-CM

## 2024-08-27 PROCEDURE — 99213 OFFICE O/P EST LOW 20 MIN: CPT | Mod: 25

## 2024-08-27 PROCEDURE — 36901Z: CUSTOM

## 2024-08-28 NOTE — HISTORY OF PRESENT ILLNESS
[] : left brachiocephalic fistula [FreeTextEntry1] : alert and oriented  accompanied by  took hydralazine and labetalol no reported falls [FreeTextEntry4] : yesterday  [FreeTextEntry5] : yesterday at 900pm [FreeTextEntry6] : Dr Haider

## 2024-08-28 NOTE — PROCEDURE
[Resume diet] : resume diet [Site check for bleeding/hematoma/thrill/bruit] : Site check for bleeding/hematoma/thrill/bruit [Vital signs on admission the q 15 mins x2] : Vital signs on admission the q 15 mins x2 [FreeTextEntry1] : Left fistula, fistulogram/angioplasty

## 2024-08-28 NOTE — PAST MEDICAL HISTORY
[Increasing age ( >40 years old)] : Increasing age ( >40 years old) [No therapy indicated for cases scheduled for less than one hour] : No therapy indicated for cases scheduled for less than one hour. [FreeTextEntry1] : Malignant Hyperthermia Screening Tool and Risk of Bleeding Assessment \par  DIANE HAQ  denies family of unexpected death following Anesthesia or Exercise.\par  Denies family history of Malignant Hyperthermia, Muscle or Neuromuscular disorder and High Temperature following exercise. \par  \par  DIANE HAQ Patient denies history of Muscle Spasm, Dark or Chocolate- Colored and unanticipated fever immediately following anesthesia or serious exercise. \par  \par  DIANE HAQ Patient also denies bleeding tendencies/risks of bleeding.

## 2024-08-28 NOTE — ASSESSMENT
[Other: _____] : [unfilled] [FreeTextEntry1] : 57-year-old male with a past medical history of end-stage renal disease on HD via left upper extremity brachiocephalic AV fistula.  Given increase in size of AV fistula with evidence of stenosis we will plan for left upper extremity fistulogram to evaluate for any central venous stenosis

## 2024-08-28 NOTE — HISTORY OF PRESENT ILLNESS
[] : left brachiocephalic fistula [FreeTextEntry1] : alert and oriented  accompanied by  took hydralazine and labetalol no reported falls [FreeTextEntry4] : yesterday  [FreeTextEntry5] : yesterday at 900pm [FreeTextEntry6] : Dr Haidre

## 2024-09-09 ENCOUNTER — APPOINTMENT (OUTPATIENT)
Dept: CARDIOLOGY | Facility: CLINIC | Age: 59
End: 2024-09-09
Payer: MEDICAID

## 2024-09-09 ENCOUNTER — NON-APPOINTMENT (OUTPATIENT)
Age: 59
End: 2024-09-09

## 2024-09-09 VITALS
DIASTOLIC BLOOD PRESSURE: 78 MMHG | HEIGHT: 70 IN | OXYGEN SATURATION: 97 % | SYSTOLIC BLOOD PRESSURE: 119 MMHG | HEART RATE: 78 BPM

## 2024-09-09 DIAGNOSIS — R01.1 CARDIAC MURMUR, UNSPECIFIED: ICD-10-CM

## 2024-09-09 DIAGNOSIS — E78.5 HYPERLIPIDEMIA, UNSPECIFIED: ICD-10-CM

## 2024-09-09 DIAGNOSIS — I77.0 ARTERIOVENOUS FISTULA, ACQUIRED: ICD-10-CM

## 2024-09-09 DIAGNOSIS — D64.9 ANEMIA, UNSPECIFIED: ICD-10-CM

## 2024-09-09 DIAGNOSIS — Z01.818 ENCOUNTER FOR OTHER PREPROCEDURAL EXAMINATION: ICD-10-CM

## 2024-09-09 DIAGNOSIS — R91.8 OTHER NONSPECIFIC ABNORMAL FINDING OF LUNG FIELD: ICD-10-CM

## 2024-09-09 DIAGNOSIS — Z99.2 DEPENDENCE ON RENAL DIALYSIS: ICD-10-CM

## 2024-09-09 DIAGNOSIS — N18.9 CHRONIC KIDNEY DISEASE, UNSPECIFIED: ICD-10-CM

## 2024-09-09 DIAGNOSIS — I10 ESSENTIAL (PRIMARY) HYPERTENSION: ICD-10-CM

## 2024-09-09 DIAGNOSIS — D12.6 BENIGN NEOPLASM OF COLON, UNSPECIFIED: ICD-10-CM

## 2024-09-09 DIAGNOSIS — R06.09 OTHER FORMS OF DYSPNEA: ICD-10-CM

## 2024-09-09 DIAGNOSIS — Z00.00 ENCOUNTER FOR GENERAL ADULT MEDICAL EXAMINATION W/OUT ABNORMAL FINDINGS: ICD-10-CM

## 2024-09-09 PROCEDURE — 93000 ELECTROCARDIOGRAM COMPLETE: CPT

## 2024-09-09 NOTE — HISTORY OF PRESENT ILLNESS
[FreeTextEntry1] : This is a 59 year y/o male with a PMHx of  HTN, HLD, CKD, Anemia presents today for follow up and [facility]   I was asked to see this delightful patient today for Pre-op Evaluation  prior to LEFT FOR AVF ANEURYSM Surgery with   Dr. Wray at fax number   _______  .  The patient denies fever, cough, wheezing, sputum production, hemoptysis, dyspnea, congestion, diarrhea, constipation, nausea, vomiting, bright red blood per rectum, melena, angina, chest pain, palpitations, diaphoresis, PND, incontinence, frequency, urgency, dysuria, edema, joint pain, headache, weakness, numbness and dizziness. The date of the planned procedure is:9/18/2024.  pt also wants to schedule colonoscopy with the dr. Cha.  Of note, pt reports when he walks up stairs, he does get out of breath.   The patient here for evaluation of high blood pressure. Patient is currently tolerating the current antihypertensive regime and they deny headaches, stiff neck, visual changes, or PND. The patient has been trying to stay on a low-sodium diet. Patient is on hemodialysis for chronic kidney failure.  The patient is here for follow-up of elevated cholesterol. Patient is currently tolerating medication and denies muscle pain, joint pain, back pain, urinary changes , nausea, vomiting, abdominal pain or diarrhea. The patient is trying to follow a low cholesterol diet.  The patient is also here for CKD they are taking there BP meds regularly and also staying away from OTC nephrotoxic drugs such as NSAIDs and over-the-counter supplements as previously advised. Dialysis M,W,F.  Pt is here to follow up mild anemia reports no black stools, BRBPR or blood in urine.

## 2024-09-10 ENCOUNTER — APPOINTMENT (OUTPATIENT)
Dept: ENDOVASCULAR SURGERY | Facility: CLINIC | Age: 59
End: 2024-09-10

## 2024-09-10 ENCOUNTER — RESULT REVIEW (OUTPATIENT)
Age: 59
End: 2024-09-10

## 2024-09-10 VITALS
TEMPERATURE: 98.1 F | WEIGHT: 175 LBS | HEIGHT: 70 IN | HEART RATE: 79 BPM | RESPIRATION RATE: 16 BRPM | BODY MASS INDEX: 25.05 KG/M2 | DIASTOLIC BLOOD PRESSURE: 90 MMHG | OXYGEN SATURATION: 93 % | SYSTOLIC BLOOD PRESSURE: 170 MMHG

## 2024-09-10 DIAGNOSIS — N18.6 END STAGE RENAL DISEASE: ICD-10-CM

## 2024-09-10 DIAGNOSIS — R74.8 ABNORMAL LEVELS OF OTHER SERUM ENZYMES: ICD-10-CM

## 2024-09-10 LAB
25(OH)D3 SERPL-MCNC: 33.9 NG/ML
ALBUMIN SERPL ELPH-MCNC: 4.2 G/DL
ALP BLD-CCNC: 213 U/L
ALT SERPL-CCNC: 14 U/L
ANION GAP SERPL CALC-SCNC: 18 MMOL/L
APO B SERPL-MCNC: 94 MG/DL
AST SERPL-CCNC: 15 U/L
BASOPHILS # BLD AUTO: 0.07 K/UL
BASOPHILS NFR BLD AUTO: 0.7 %
BILIRUB DIRECT SERPL-MCNC: 0.1 MG/DL
BILIRUB INDIRECT SERPL-MCNC: 0.2 MG/DL
BILIRUB SERPL-MCNC: 0.3 MG/DL
BUN SERPL-MCNC: 29 MG/DL
CALCIUM SERPL-MCNC: 8.8 MG/DL
CHLORIDE SERPL-SCNC: 92 MMOL/L
CHOLEST SERPL-MCNC: 183 MG/DL
CK SERPL-CCNC: 160 U/L
CO2 SERPL-SCNC: 28 MMOL/L
CREAT SERPL-MCNC: 6.92 MG/DL
CRP SERPL HS-MCNC: 13.2 MG/L
EGFR: 9 ML/MIN/1.73M2
EOSINOPHIL # BLD AUTO: 0.35 K/UL
EOSINOPHIL NFR BLD AUTO: 3.7 %
ESTIMATED AVERAGE GLUCOSE: 82 MG/DL
FERRITIN SERPL-MCNC: 1626 NG/ML
FOLATE SERPL-MCNC: 6.4 NG/ML
GLUCOSE SERPL-MCNC: 89 MG/DL
HBA1C MFR BLD HPLC: 4.5 %
HCT VFR BLD CALC: 29.8 %
HDLC SERPL-MCNC: 39 MG/DL
HGB BLD-MCNC: 9.4 G/DL
IMM GRANULOCYTES NFR BLD AUTO: 0.3 %
IRON SATN MFR SERPL: 24 %
IRON SERPL-MCNC: 57 UG/DL
LDLC SERPL CALC-MCNC: 122 MG/DL
LDLC SERPL DIRECT ASSAY-MCNC: 112 MG/DL
LYMPHOCYTES # BLD AUTO: 1.89 K/UL
LYMPHOCYTES NFR BLD AUTO: 20 %
MAN DIFF?: NORMAL
MCHC RBC-ENTMCNC: 30.9 PG
MCHC RBC-ENTMCNC: 31.5 GM/DL
MCV RBC AUTO: 98 FL
MONOCYTES # BLD AUTO: 1.1 K/UL
MONOCYTES NFR BLD AUTO: 11.6 %
NEUTROPHILS # BLD AUTO: 6.02 K/UL
NEUTROPHILS NFR BLD AUTO: 63.7 %
NONHDLC SERPL-MCNC: 145 MG/DL
PLATELET # BLD AUTO: 155 K/UL
POTASSIUM SERPL-SCNC: 4.8 MMOL/L
PROT SERPL-MCNC: 7.7 G/DL
PSA SERPL-MCNC: 1.41 NG/ML
RBC # BLD: 3.04 M/UL
RBC # FLD: 16.9 %
SODIUM SERPL-SCNC: 138 MMOL/L
T4 FREE SERPL-MCNC: 1.3 NG/DL
TIBC SERPL-MCNC: 238 UG/DL
TRANSFERRIN SERPL-MCNC: 173 MG/DL
TRIGL SERPL-MCNC: 123 MG/DL
TSH SERPL-ACNC: 0.98 UIU/ML
UIBC SERPL-MCNC: 181 UG/DL
VIT B12 SERPL-MCNC: 450 PG/ML
WBC # FLD AUTO: 9.46 K/UL

## 2024-09-10 PROCEDURE — 36558 INSERT TUNNELED CV CATH: CPT | Mod: RT

## 2024-09-10 PROCEDURE — 76937 US GUIDE VASCULAR ACCESS: CPT

## 2024-09-10 PROCEDURE — 77001 FLUOROGUIDE FOR VEIN DEVICE: CPT

## 2024-09-10 NOTE — HISTORY OF PRESENT ILLNESS
[FreeTextEntry1] : alert and oriented  accompanied by  took hydralazine and labetalol no reported falls [FreeTextEntry4] : yesterday  [FreeTextEntry5] : yesterday at 900pm [FreeTextEntry6] : Dr Haider

## 2024-09-11 ENCOUNTER — APPOINTMENT (OUTPATIENT)
Dept: ENDOVASCULAR SURGERY | Facility: CLINIC | Age: 59
End: 2024-09-11
Payer: MEDICAID

## 2024-09-11 PROCEDURE — 36589 REMOVAL TUNNELED CV CATH: CPT

## 2024-09-12 ENCOUNTER — APPOINTMENT (OUTPATIENT)
Dept: CARDIOLOGY | Facility: CLINIC | Age: 59
End: 2024-09-12
Payer: MEDICAID

## 2024-09-12 PROCEDURE — 93306 TTE W/DOPPLER COMPLETE: CPT

## 2024-09-12 PROCEDURE — ZZZZZ: CPT

## 2024-09-16 ENCOUNTER — NON-APPOINTMENT (OUTPATIENT)
Age: 59
End: 2024-09-16

## 2024-09-16 ENCOUNTER — APPOINTMENT (OUTPATIENT)
Dept: CARDIOLOGY | Facility: CLINIC | Age: 59
End: 2024-09-16

## 2024-09-18 ENCOUNTER — APPOINTMENT (OUTPATIENT)
Dept: VASCULAR SURGERY | Facility: HOSPITAL | Age: 59
End: 2024-09-18

## 2024-09-23 ENCOUNTER — APPOINTMENT (OUTPATIENT)
Dept: ULTRASOUND IMAGING | Facility: CLINIC | Age: 59
End: 2024-09-23
Payer: MEDICAID

## 2024-09-23 PROCEDURE — 76705 ECHO EXAM OF ABDOMEN: CPT | Mod: 26

## 2024-09-25 ENCOUNTER — APPOINTMENT (OUTPATIENT)
Dept: GASTROENTEROLOGY | Facility: CLINIC | Age: 59
End: 2024-09-25
Payer: MEDICAID

## 2024-09-25 VITALS
SYSTOLIC BLOOD PRESSURE: 143 MMHG | OXYGEN SATURATION: 98 % | HEART RATE: 76 BPM | DIASTOLIC BLOOD PRESSURE: 79 MMHG | HEIGHT: 70 IN | TEMPERATURE: 98.1 F | BODY MASS INDEX: 24.77 KG/M2 | WEIGHT: 173 LBS

## 2024-09-25 DIAGNOSIS — Z99.2 DEPENDENCE ON RENAL DIALYSIS: ICD-10-CM

## 2024-09-25 DIAGNOSIS — N18.6 END STAGE RENAL DISEASE: ICD-10-CM

## 2024-09-25 DIAGNOSIS — D12.6 BENIGN NEOPLASM OF COLON, UNSPECIFIED: ICD-10-CM

## 2024-09-25 DIAGNOSIS — N18.9 CHRONIC KIDNEY DISEASE, UNSPECIFIED: ICD-10-CM

## 2024-09-25 PROCEDURE — 99204 OFFICE O/P NEW MOD 45 MIN: CPT

## 2024-09-25 RX ORDER — POLYETHYLENE GLYCOL-3350, SODIUM CHLORIDE, POTASSIUM CHLORIDE AND SODIUM BICARBONATE 420; 11.2; 5.72; 1.48 G/438.4G; G/438.4G; G/438.4G; G/438.4G
420 POWDER, FOR SOLUTION ORAL
Qty: 1 | Refills: 0 | Status: ACTIVE | COMMUNITY
Start: 2024-09-25 | End: 1900-01-01

## 2024-09-25 NOTE — REVIEW OF SYSTEMS
[As Noted in HPI] : as noted in HPI [Negative] : Heme/Lymph [FreeTextEntry5] : Heart murmur [FreeTextEntry8] : CKD on dialysis

## 2024-09-25 NOTE — HISTORY OF PRESENT ILLNESS
[FreeTextEntry1] : Patient is referred by Dr. Hebert for a screening colonoscopy.  He does have a history of colon polyps.  He had his last colonoscopy prior to the pandemic.  His bowel movements are regular.  He denies seeing any blood or mucus in the stool. Patient has CKD and is on dialysis 3 times a week.  He has been on dialysis for many years. He does have a history of GERD and is stable on his current regimen.  Blood work revealed H/H 9.4/29.8, MCV 98, iron/TIBC 57/238, 24% iron saturation, ferritin 1626.

## 2024-09-25 NOTE — REASON FOR VISIT
[Initial Evaluation] : an initial evaluation [FreeTextEntry1] : Screen for colon cancer, CKD on dialysis

## 2024-09-25 NOTE — PHYSICAL EXAM
[Alert] : alert [Normal Voice/Communication] : normal voice/communication [Healthy Appearing] : healthy appearing [No Acute Distress] : no acute distress [Sclera] : the sclera and conjunctiva were normal [Hearing Threshold Finger Rub Not [place]] : hearing was normal [Normal Lips/Gums] : the lips and gums were normal [Oropharynx] : the oropharynx was normal [Normal Appearance] : the appearance of the neck was normal [No Neck Mass] : no neck mass was observed [No Respiratory Distress] : no respiratory distress [No Acc Muscle Use] : no accessory muscle use [Respiration, Rhythm And Depth] : normal respiratory rhythm and effort [Auscultation Breath Sounds / Voice Sounds] : lungs were clear to auscultation bilaterally [Heart Rate And Rhythm] : heart rate was normal and rhythm regular [Normal S1, S2] : normal S1 and S2 [Murmurs] : no murmurs [Bowel Sounds] : normal bowel sounds [Abdomen Tenderness] : non-tender [No Masses] : no abdominal mass palpated [Abdomen Soft] : soft [] : no hepatosplenomegaly [Oriented To Time, Place, And Person] : oriented to person, place, and time [de-identified] : shunt in L arm

## 2024-09-25 NOTE — ASSESSMENT
[FreeTextEntry1] : Patient with history of colon polyps will be scheduled for surveillance colonoscopy.  FOBT will be sent to the lab. He is on dialysis 3 times a week for CKD.  The colonoscopy will be done in the hospital.  He does not have any evidence of iron deficiency anemia. Patient will need medical and cardiac clearance prior to the procedure.

## 2024-10-01 ENCOUNTER — APPOINTMENT (OUTPATIENT)
Dept: CT IMAGING | Facility: CLINIC | Age: 59
End: 2024-10-01

## 2024-10-03 ENCOUNTER — APPOINTMENT (OUTPATIENT)
Dept: VASCULAR SURGERY | Facility: CLINIC | Age: 59
End: 2024-10-03

## 2024-11-26 NOTE — CHART NOTE - NSCHARTNOTEFT_GEN_A_CORE
Patient called to be informed of below. No answer and message left to call the office back.    VINAY called to 4S hallway. Arrived at [facility] spoke w/ security and informed that patient eloped before rest of [facility] team could arrive, was not able to issue discharge paperwork as patient did not want to wait for a provider.   Discussed w/ primary RN, ADN, [facility] - medicine service to reach out to attending of record Dr. Bundy to inform him of elopement.     Ivy Ramon MD PGY-3  MAR

## 2024-12-16 NOTE — ED ADULT TRIAGE NOTE - BP NONINVASIVE DIASTOLIC (MM HG)
Problem: Pain  Goal: Verbalizes/displays adequate comfort level or baseline comfort level  12/16/2024 0653 by nAne Malin RN  Flowsheets (Taken 12/16/2024 0653)  Verbalizes/displays adequate comfort level or baseline comfort level:   Encourage patient to monitor pain and request assistance   Assess pain using appropriate pain scale   Implement non-pharmacological measures as appropriate and evaluate response   Administer analgesics based on type and severity of pain and evaluate response  12/16/2024 0653 by Anne Malin RN  Outcome: Progressing  Flowsheets (Taken 12/16/2024 0653)  Verbalizes/displays adequate comfort level or baseline comfort level:   Encourage patient to monitor pain and request assistance   Assess pain using appropriate pain scale   Implement non-pharmacological measures as appropriate and evaluate response   Administer analgesics based on type and severity of pain and evaluate response     Problem: Safety - Adult  Goal: Free from fall injury  Flowsheets (Taken 12/16/2024 0653)  Free From Fall Injury: Instruct family/caregiver on patient safety      96

## 2025-04-16 NOTE — ED PROVIDER NOTE - PROGRESS NOTE DETAILS
Updated lipid panel ordered  Current ASCVD risk at 1.0%, no current statin therapy     Orders:    Lipid panel; Future    TSH, 3rd generation with Free T4 reflex; Future     Dr. Pascual Hodges, PGY-2: EKG w/ peaked T-waves. HyperK meds ordered. Pending renal consult for HD Dr. Pascual Hodges, PGY-2: attempted to contact pt's nephrologist Dr. Todd Avilez. Called office but no answer and no after hour contact info provided. Paged house nephro Dr. Pascual Hodges, PGY-2: [facility] website has contact info for Dr. Avilez. Left message w/ answering service Dr. Pascual Hodges, PGY-2: spoke w/ covering doctor for Dr. Avilez, Dr. Rose. Stated he will arrange for pt to received HD Dr. Pascual Hodges, PGY-2: rpt BMP w/ K downtrending to 5.4. Accepted under hospitalist service on tele.

## 2025-05-13 NOTE — H&P ADULT - PROBLEM SELECTOR PLAN 1
The patient has been examined and the H&P has been reviewed:    I concur with the findings and no changes have occurred since H&P was written.    Surgery risks, benefits and alternative options discussed and understood by patient/family.          There are no hospital problems to display for this patient.     Need to rule out UGIB given melena and symptoms of symptomatic anemia. 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